# Patient Record
Sex: MALE | Race: BLACK OR AFRICAN AMERICAN | NOT HISPANIC OR LATINO | URBAN - METROPOLITAN AREA
[De-identification: names, ages, dates, MRNs, and addresses within clinical notes are randomized per-mention and may not be internally consistent; named-entity substitution may affect disease eponyms.]

---

## 2017-06-01 ENCOUNTER — OUTPATIENT (OUTPATIENT)
Dept: OUTPATIENT SERVICES | Facility: HOSPITAL | Age: 37
LOS: 1 days | End: 2017-06-01
Payer: MEDICAID

## 2017-06-06 DIAGNOSIS — R69 ILLNESS, UNSPECIFIED: ICD-10-CM

## 2017-08-01 PROCEDURE — G9001: CPT

## 2022-03-12 ENCOUNTER — INPATIENT (INPATIENT)
Facility: HOSPITAL | Age: 42
LOS: 5 days | Discharge: HOME | End: 2022-03-18
Attending: FAMILY MEDICINE | Admitting: FAMILY MEDICINE
Payer: MEDICAID

## 2022-03-12 VITALS
OXYGEN SATURATION: 100 % | SYSTOLIC BLOOD PRESSURE: 142 MMHG | TEMPERATURE: 99 F | RESPIRATION RATE: 40 BRPM | WEIGHT: 175.93 LBS | DIASTOLIC BLOOD PRESSURE: 86 MMHG | HEART RATE: 137 BPM | HEIGHT: 73 IN

## 2022-03-12 DIAGNOSIS — D69.6 THROMBOCYTOPENIA, UNSPECIFIED: ICD-10-CM

## 2022-03-12 DIAGNOSIS — F14.10 COCAINE ABUSE, UNCOMPLICATED: ICD-10-CM

## 2022-03-12 DIAGNOSIS — Z91.013 ALLERGY TO SEAFOOD: ICD-10-CM

## 2022-03-12 DIAGNOSIS — I42.6 ALCOHOLIC CARDIOMYOPATHY: ICD-10-CM

## 2022-03-12 DIAGNOSIS — F10.10 ALCOHOL ABUSE, UNCOMPLICATED: ICD-10-CM

## 2022-03-12 DIAGNOSIS — H10.9 UNSPECIFIED CONJUNCTIVITIS: ICD-10-CM

## 2022-03-12 DIAGNOSIS — L30.9 DERMATITIS, UNSPECIFIED: ICD-10-CM

## 2022-03-12 DIAGNOSIS — I50.23 ACUTE ON CHRONIC SYSTOLIC (CONGESTIVE) HEART FAILURE: ICD-10-CM

## 2022-03-12 DIAGNOSIS — J45.901 UNSPECIFIED ASTHMA WITH (ACUTE) EXACERBATION: ICD-10-CM

## 2022-03-12 DIAGNOSIS — F12.90 CANNABIS USE, UNSPECIFIED, UNCOMPLICATED: ICD-10-CM

## 2022-03-12 DIAGNOSIS — F17.210 NICOTINE DEPENDENCE, CIGARETTES, UNCOMPLICATED: ICD-10-CM

## 2022-03-12 DIAGNOSIS — J18.9 PNEUMONIA, UNSPECIFIED ORGANISM: ICD-10-CM

## 2022-03-12 DIAGNOSIS — J96.01 ACUTE RESPIRATORY FAILURE WITH HYPOXIA: ICD-10-CM

## 2022-03-12 LAB
ALBUMIN SERPL ELPH-MCNC: 3.1 G/DL — LOW (ref 3.3–5)
ALBUMIN SERPL ELPH-MCNC: 3.1 G/DL — LOW (ref 3.3–5)
ALP SERPL-CCNC: 141 U/L — HIGH (ref 40–120)
ALP SERPL-CCNC: 146 U/L — HIGH (ref 40–120)
ALT FLD-CCNC: 74 U/L — SIGNIFICANT CHANGE UP (ref 12–78)
ALT FLD-CCNC: 87 U/L — HIGH (ref 12–78)
ANION GAP SERPL CALC-SCNC: 6 MMOL/L — SIGNIFICANT CHANGE UP (ref 5–17)
ANION GAP SERPL CALC-SCNC: 7 MMOL/L — SIGNIFICANT CHANGE UP (ref 5–17)
AST SERPL-CCNC: 91 U/L — HIGH (ref 15–37)
AST SERPL-CCNC: 94 U/L — HIGH (ref 15–37)
BASE EXCESS BLDA CALC-SCNC: 0.9 MMOL/L — SIGNIFICANT CHANGE UP (ref -2–3)
BASOPHILS # BLD AUTO: 0.07 K/UL — SIGNIFICANT CHANGE UP (ref 0–0.2)
BASOPHILS NFR BLD AUTO: 0.9 % — SIGNIFICANT CHANGE UP (ref 0–2)
BILIRUB DIRECT SERPL-MCNC: 0.2 MG/DL — SIGNIFICANT CHANGE UP (ref 0–0.3)
BILIRUB INDIRECT FLD-MCNC: 0.5 MG/DL — SIGNIFICANT CHANGE UP (ref 0.2–1)
BILIRUB SERPL-MCNC: 0.7 MG/DL — SIGNIFICANT CHANGE UP (ref 0.2–1.2)
BILIRUB SERPL-MCNC: 0.9 MG/DL — SIGNIFICANT CHANGE UP (ref 0.2–1.2)
BLOOD GAS COMMENTS: SIGNIFICANT CHANGE UP
BLOOD GAS COMMENTS: SIGNIFICANT CHANGE UP
BUN SERPL-MCNC: 16 MG/DL — SIGNIFICANT CHANGE UP (ref 7–23)
BUN SERPL-MCNC: 17 MG/DL — SIGNIFICANT CHANGE UP (ref 7–23)
CALCIUM SERPL-MCNC: 7.9 MG/DL — LOW (ref 8.5–10.1)
CALCIUM SERPL-MCNC: 8.3 MG/DL — LOW (ref 8.5–10.1)
CHLORIDE SERPL-SCNC: 103 MMOL/L — SIGNIFICANT CHANGE UP (ref 96–108)
CHLORIDE SERPL-SCNC: 107 MMOL/L — SIGNIFICANT CHANGE UP (ref 96–108)
CO2 BLDA-SCNC: 25 MMOL/L — HIGH (ref 19–24)
CO2 SERPL-SCNC: 25 MMOL/L — SIGNIFICANT CHANGE UP (ref 22–31)
CO2 SERPL-SCNC: 26 MMOL/L — SIGNIFICANT CHANGE UP (ref 22–31)
CREAT SERPL-MCNC: 1.04 MG/DL — SIGNIFICANT CHANGE UP (ref 0.5–1.3)
CREAT SERPL-MCNC: 1.22 MG/DL — SIGNIFICANT CHANGE UP (ref 0.5–1.3)
EGFR: 77 ML/MIN/1.73M2 — SIGNIFICANT CHANGE UP
EGFR: 94 ML/MIN/1.73M2 — SIGNIFICANT CHANGE UP
EOSINOPHIL # BLD AUTO: 0.68 K/UL — HIGH (ref 0–0.5)
EOSINOPHIL NFR BLD AUTO: 8.3 % — HIGH (ref 0–6)
FLUAV AG NPH QL: SIGNIFICANT CHANGE UP
FLUBV AG NPH QL: SIGNIFICANT CHANGE UP
GAS PNL BLDA: SIGNIFICANT CHANGE UP
GAS PNL BLDV: SIGNIFICANT CHANGE UP
GLUCOSE SERPL-MCNC: 103 MG/DL — HIGH (ref 70–99)
GLUCOSE SERPL-MCNC: 190 MG/DL — HIGH (ref 70–99)
HCO3 BLDA-SCNC: 24 MMOL/L — SIGNIFICANT CHANGE UP (ref 21–28)
HCT VFR BLD CALC: 40.2 % — SIGNIFICANT CHANGE UP (ref 39–50)
HGB BLD-MCNC: 13.2 G/DL — SIGNIFICANT CHANGE UP (ref 13–17)
HOROWITZ INDEX BLDA+IHG-RTO: 28 — SIGNIFICANT CHANGE UP
IMM GRANULOCYTES NFR BLD AUTO: 0.2 % — SIGNIFICANT CHANGE UP (ref 0–1.5)
LYMPHOCYTES # BLD AUTO: 1.68 K/UL — SIGNIFICANT CHANGE UP (ref 1–3.3)
LYMPHOCYTES # BLD AUTO: 20.5 % — SIGNIFICANT CHANGE UP (ref 13–44)
MAGNESIUM SERPL-MCNC: 2.1 MG/DL — SIGNIFICANT CHANGE UP (ref 1.6–2.6)
MCHC RBC-ENTMCNC: 32.8 G/DL — SIGNIFICANT CHANGE UP (ref 32–36)
MCHC RBC-ENTMCNC: 32.8 PG — SIGNIFICANT CHANGE UP (ref 27–34)
MCV RBC AUTO: 100 FL — SIGNIFICANT CHANGE UP (ref 80–100)
MONOCYTES # BLD AUTO: 0.98 K/UL — HIGH (ref 0–0.9)
MONOCYTES NFR BLD AUTO: 12 % — SIGNIFICANT CHANGE UP (ref 2–14)
NEUTROPHILS # BLD AUTO: 4.77 K/UL — SIGNIFICANT CHANGE UP (ref 1.8–7.4)
NEUTROPHILS NFR BLD AUTO: 58.1 % — SIGNIFICANT CHANGE UP (ref 43–77)
NRBC # BLD: 0 /100 WBCS — SIGNIFICANT CHANGE UP (ref 0–0)
NT-PROBNP SERPL-SCNC: 3696 PG/ML — HIGH (ref 0–125)
PCO2 BLDA: 33 MMHG — SIGNIFICANT CHANGE UP (ref 32–46)
PH BLD: 7.47 — HIGH (ref 7.35–7.45)
PHOSPHATE SERPL-MCNC: 1.9 MG/DL — LOW (ref 2.5–4.5)
PLATELET # BLD AUTO: 125 K/UL — LOW (ref 150–400)
PO2 BLDA: 114 MMHG — HIGH (ref 83–108)
POTASSIUM SERPL-MCNC: 3.6 MMOL/L — SIGNIFICANT CHANGE UP (ref 3.5–5.3)
POTASSIUM SERPL-MCNC: 4.9 MMOL/L — SIGNIFICANT CHANGE UP (ref 3.5–5.3)
POTASSIUM SERPL-SCNC: 3.6 MMOL/L — SIGNIFICANT CHANGE UP (ref 3.5–5.3)
POTASSIUM SERPL-SCNC: 4.9 MMOL/L — SIGNIFICANT CHANGE UP (ref 3.5–5.3)
PROT SERPL-MCNC: 7.5 GM/DL — SIGNIFICANT CHANGE UP (ref 6–8.3)
PROT SERPL-MCNC: 7.6 GM/DL — SIGNIFICANT CHANGE UP (ref 6–8.3)
RAPID RVP RESULT: SIGNIFICANT CHANGE UP
RBC # BLD: 4.02 M/UL — LOW (ref 4.2–5.8)
RBC # FLD: 13.1 % — SIGNIFICANT CHANGE UP (ref 10.3–14.5)
SAO2 % BLDA: 99.3 % — HIGH (ref 94–98)
SARS-COV-2 RNA SPEC QL NAA+PROBE: SIGNIFICANT CHANGE UP
SARS-COV-2 RNA SPEC QL NAA+PROBE: SIGNIFICANT CHANGE UP
SODIUM SERPL-SCNC: 136 MMOL/L — SIGNIFICANT CHANGE UP (ref 135–145)
SODIUM SERPL-SCNC: 138 MMOL/L — SIGNIFICANT CHANGE UP (ref 135–145)
TROPONIN I, HIGH SENSITIVITY RESULT: 58.4 NG/L — SIGNIFICANT CHANGE UP
WBC # BLD: 8.2 K/UL — SIGNIFICANT CHANGE UP (ref 3.8–10.5)
WBC # FLD AUTO: 8.2 K/UL — SIGNIFICANT CHANGE UP (ref 3.8–10.5)

## 2022-03-12 PROCEDURE — 99285 EMERGENCY DEPT VISIT HI MDM: CPT

## 2022-03-12 PROCEDURE — 99222 1ST HOSP IP/OBS MODERATE 55: CPT

## 2022-03-12 PROCEDURE — 93010 ELECTROCARDIOGRAM REPORT: CPT

## 2022-03-12 PROCEDURE — 93970 EXTREMITY STUDY: CPT | Mod: 26

## 2022-03-12 PROCEDURE — 71045 X-RAY EXAM CHEST 1 VIEW: CPT | Mod: 26

## 2022-03-12 RX ORDER — INFLUENZA VIRUS VACCINE 15; 15; 15; 15 UG/.5ML; UG/.5ML; UG/.5ML; UG/.5ML
0.5 SUSPENSION INTRAMUSCULAR ONCE
Refills: 0 | Status: DISCONTINUED | OUTPATIENT
Start: 2022-03-12 | End: 2022-03-18

## 2022-03-12 RX ORDER — POTASSIUM CHLORIDE 20 MEQ
40 PACKET (EA) ORAL ONCE
Refills: 0 | Status: COMPLETED | OUTPATIENT
Start: 2022-03-12 | End: 2022-03-12

## 2022-03-12 RX ORDER — BUDESONIDE AND FORMOTEROL FUMARATE DIHYDRATE 160; 4.5 UG/1; UG/1
2 AEROSOL RESPIRATORY (INHALATION)
Refills: 0 | Status: DISCONTINUED | OUTPATIENT
Start: 2022-03-12 | End: 2022-03-13

## 2022-03-12 RX ORDER — HEPARIN SODIUM 5000 [USP'U]/ML
5000 INJECTION INTRAVENOUS; SUBCUTANEOUS EVERY 12 HOURS
Refills: 0 | Status: DISCONTINUED | OUTPATIENT
Start: 2022-03-12 | End: 2022-03-18

## 2022-03-12 RX ORDER — MAGNESIUM SULFATE 500 MG/ML
2 VIAL (ML) INJECTION ONCE
Refills: 0 | Status: COMPLETED | OUTPATIENT
Start: 2022-03-12 | End: 2022-03-12

## 2022-03-12 RX ORDER — TIOTROPIUM BROMIDE 18 UG/1
1 CAPSULE ORAL; RESPIRATORY (INHALATION) DAILY
Refills: 0 | Status: DISCONTINUED | OUTPATIENT
Start: 2022-03-12 | End: 2022-03-18

## 2022-03-12 RX ORDER — AZITHROMYCIN 500 MG/1
500 TABLET, FILM COATED ORAL ONCE
Refills: 0 | Status: COMPLETED | OUTPATIENT
Start: 2022-03-12 | End: 2022-03-12

## 2022-03-12 RX ORDER — IPRATROPIUM/ALBUTEROL SULFATE 18-103MCG
3 AEROSOL WITH ADAPTER (GRAM) INHALATION EVERY 6 HOURS
Refills: 0 | Status: DISCONTINUED | OUTPATIENT
Start: 2022-03-12 | End: 2022-03-18

## 2022-03-12 RX ORDER — CEFTRIAXONE 500 MG/1
1000 INJECTION, POWDER, FOR SOLUTION INTRAMUSCULAR; INTRAVENOUS EVERY 24 HOURS
Refills: 0 | Status: COMPLETED | OUTPATIENT
Start: 2022-03-12 | End: 2022-03-16

## 2022-03-12 RX ORDER — BUDESONIDE AND FORMOTEROL FUMARATE DIHYDRATE 160; 4.5 UG/1; UG/1
2 AEROSOL RESPIRATORY (INHALATION)
Refills: 0 | Status: DISCONTINUED | OUTPATIENT
Start: 2022-03-12 | End: 2022-03-12

## 2022-03-12 RX ORDER — CEFTRIAXONE 500 MG/1
1000 INJECTION, POWDER, FOR SOLUTION INTRAMUSCULAR; INTRAVENOUS ONCE
Refills: 0 | Status: COMPLETED | OUTPATIENT
Start: 2022-03-12 | End: 2022-03-12

## 2022-03-12 RX ORDER — IPRATROPIUM/ALBUTEROL SULFATE 18-103MCG
3 AEROSOL WITH ADAPTER (GRAM) INHALATION
Refills: 0 | Status: COMPLETED | OUTPATIENT
Start: 2022-03-12 | End: 2022-03-12

## 2022-03-12 RX ORDER — HYDROXYZINE HCL 10 MG
25 TABLET ORAL DAILY
Refills: 0 | Status: COMPLETED | OUTPATIENT
Start: 2022-03-12 | End: 2022-03-17

## 2022-03-12 RX ADMIN — Medication 1 APPLICATION(S): at 17:04

## 2022-03-12 RX ADMIN — CEFTRIAXONE 100 MILLIGRAM(S): 500 INJECTION, POWDER, FOR SOLUTION INTRAMUSCULAR; INTRAVENOUS at 14:18

## 2022-03-12 RX ADMIN — Medication 3 MILLILITER(S): at 06:04

## 2022-03-12 RX ADMIN — Medication 2 GRAM(S): at 06:05

## 2022-03-12 RX ADMIN — Medication 3 MILLILITER(S): at 17:57

## 2022-03-12 RX ADMIN — Medication 25 MILLIGRAM(S): at 15:41

## 2022-03-12 RX ADMIN — Medication 40 MILLIGRAM(S): at 14:18

## 2022-03-12 RX ADMIN — Medication 3 MILLILITER(S): at 05:44

## 2022-03-12 RX ADMIN — BUDESONIDE AND FORMOTEROL FUMARATE DIHYDRATE 2 PUFF(S): 160; 4.5 AEROSOL RESPIRATORY (INHALATION) at 23:50

## 2022-03-12 RX ADMIN — Medication 20 MILLIGRAM(S): at 21:36

## 2022-03-12 RX ADMIN — CEFTRIAXONE 100 MILLIGRAM(S): 500 INJECTION, POWDER, FOR SOLUTION INTRAMUSCULAR; INTRAVENOUS at 07:03

## 2022-03-12 RX ADMIN — Medication 20 MILLIGRAM(S): at 15:43

## 2022-03-12 RX ADMIN — CEFTRIAXONE 1000 MILLIGRAM(S): 500 INJECTION, POWDER, FOR SOLUTION INTRAMUSCULAR; INTRAVENOUS at 07:31

## 2022-03-12 RX ADMIN — Medication 3 MILLILITER(S): at 06:24

## 2022-03-12 RX ADMIN — AZITHROMYCIN 255 MILLIGRAM(S): 500 TABLET, FILM COATED ORAL at 07:36

## 2022-03-12 RX ADMIN — Medication 40 MILLIEQUIVALENT(S): at 07:03

## 2022-03-12 RX ADMIN — Medication 125 MILLIGRAM(S): at 05:44

## 2022-03-12 RX ADMIN — Medication 150 GRAM(S): at 05:45

## 2022-03-12 RX ADMIN — HEPARIN SODIUM 5000 UNIT(S): 5000 INJECTION INTRAVENOUS; SUBCUTANEOUS at 17:05

## 2022-03-12 NOTE — ED ADULT NURSE REASSESSMENT NOTE - NS ED NURSE REASSESS COMMENT FT1
pt is A&Ox3, moveed to 32D at this time, updated on plan of care at this time, Doctor morgan aware of the vitals at this time, pt is currently on BIPAP
Patient alert and oriented X 4, assumed care of patient at this time. Patient on bipap and expressed improvement in condition. no wheezing noted at this time. Denies complaints, will continue to monitor.

## 2022-03-12 NOTE — ED PROVIDER NOTE - PHYSICAL EXAMINATION
VITAL SIGNS: I have reviewed nursing notes and confirm.  CONSTITUTIONAL: in acute respiratory distress   SKIN: Warm dry, normal skin turgor, diffuse eczema   HEAD: NCAT  EYES:, no scleral icterus  ENT: Moist mucous membranes, normal pharynx   NECK: Supple; non tender. Full ROM.   CARD: tachycardia, no murmurs, rubs or gallops  RESP: increased WOB, coarse b/l wheezes diffusely   ABD: soft, + BS, non-tender, non-distended, no rebound or guarding. No CVA tenderness  EXT: Full ROM, no bony tenderness, no pedal edema, no calf tenderness  NEURO: normal motor. normal sensory. non-focal   PSYCH: Cooperative, appropriate.

## 2022-03-12 NOTE — H&P ADULT - HISTORY OF PRESENT ILLNESS
41M     with hx of  childhood  br  asthma w/ no previous intubations in the past for asthma, hospitalized in the past for asthma exacerbation/  h/o child horowitz  eczema  smokes  2  cigs/ day and also indulges  in etoh/  every  5  to 7  days pe r  pt, if  he  does  not, then per  pt , he gets seizures    pt is unemployed  and lives  with hie mother     p/w via EMS for asthma exacerbation today   per  e r note, pt - was  given 12 mg Decadron, duonebs x 2, epipen - tachypneic w/ bilateral diffuse coarse wheezing bilaterally. started on bipap in the ED,    denies sick contacts. admits to non-productive cough. no fevers/chills, no other complaints. also admit to chest tightness that he feels w/ previous asthma exacerbations.  covid negative  41M     with hx of  childhood  br  asthma w/ no previous intubations in the past for asthma, hospitalized in the past for asthma exacerbation/  h/o child ohrowitz  eczema  smokes  2  cigs/ day and also indulges  in etoh/  every  5  to 7  days pe r  pt, if  he  does  not, then per  pt , he gets seizures    pt is unemployed  and lives  with hie mother  smokes  2  cigarettes/ day/ rare marihuana  use/ denies  vaping     p/w via EMS for asthma exacerbation today   per  e r note, pt - was  given 12 mg Decadron, duonebs x 2, epipen - tachypneic w/ bilateral diffuse coarse wheezing bilaterally. started on bipap in the ED,    denies sick contacts. admits to non-productive cough. no fevers/chills, no other complaints. also admit to chest tightness that he feels w/ previous asthma exacerbations.  covid negative

## 2022-03-12 NOTE — ED ADULT NURSE NOTE - OBJECTIVE STATEMENT
Patient aox3. patient with hx of HTN and asthma. Patient c/o sob, cp, palpitations, nausea and vomiting x 2 hours since 3am. Patient reports sudden onset of symptoms after laying down to sleep. Patient reports he ate tuna salad before laying down. Patient (+) smoker cigarettes. Patient with allergy to shellfish. Patient aox3. patient with hx of eczema HTN and asthma. Patient c/o sob, cp, palpitations, nausea and vomiting x 2 hours since 3am. Patient reports sudden onset of symptoms after laying down to sleep. Patient reports he ate tuna salad before laying down. Patient (+) smoker cigarettes. Patient with allergy to shellfish.

## 2022-03-12 NOTE — ED ADULT TRIAGE NOTE - CHIEF COMPLAINT QUOTE
hx of asthma PW exacerbation and midsternal chest pain. pt gave self 5 albuterol treatment w/o effect given 2 Combivent tx, epi 0.3 ml  and 12mg dexamethasone by EMS IM . In triage pt utilizing accessory muscles, speaking in short sentences.

## 2022-03-12 NOTE — ED PROVIDER NOTE - OBJECTIVE STATEMENT
41M with hx of asthma w/ no previous intubations in the past for asthma, hospitalized in the past for asthma exacerbation p/w via EMS for asthma exacerbation today - given 12 mg Decadron, duonebs x 2, epipen - tachypneic w/ bilateral diffuse coarse wheezing bilaterally. started on bipap in the ED, denies sick contacts. admits to non-productive cough. no fevers/chills, no other complaints. also admit to chest tightness that he feels w/ previous asthma exacerbations.

## 2022-03-12 NOTE — PATIENT PROFILE ADULT - FALL HARM RISK - RISK INTERVENTIONS

## 2022-03-12 NOTE — ED PROVIDER NOTE - NS ED ROS FT
Constitutional: See HPI.  Eyes: No visual changes, eye pain or discharge. No Photophobia  ENMT: No hearing changes, pain, discharge or infections. No neck pain or stiffness. No limited ROM  Cardiac: see hpi   Respiratory: see hpi   GI: No nausea, vomiting, diarrhea or abdominal pain.  : No dysuria, frequency or burning. No Discharge  MS: No myalgia, muscle weakness, joint pain or back pain.  Neuro: No headache or weakness. No LOC.  Skin: No skin rash.  Except as documented in the HPI, all other systems are negative.

## 2022-03-12 NOTE — ED PROVIDER NOTE - CLINICAL SUMMARY MEDICAL DECISION MAKING FREE TEXT BOX
Acute asthma exacerbation w/ increased WOB  Bipap - duonebs inline  solumedrol, magnesium   reassess and disposition accordingly  will s/o to oncoming attending for f/u

## 2022-03-12 NOTE — H&P ADULT - NSHPPHYSICALEXAM_GEN_ALL_CORE
PHYSICAL EXAMINATION:  Vital Signs Last 24 Hrs  T(C): 36.7 (12 Mar 2022 09:37), Max: 37.3 (12 Mar 2022 05:26)  T(F): 98 (12 Mar 2022 09:37), Max: 99.1 (12 Mar 2022 05:26)  HR: 94 (12 Mar 2022 13:25) (87 - 137)  BP: 114/68 (12 Mar 2022 09:37) (105/68 - 142/86)  BP(mean): --  RR: 20 (12 Mar 2022 09:37) (13 - 40)  SpO2: 98% (12 Mar 2022 13:25) (98% - 100%)  CAPILLARY BLOOD GLUCOSE            GENERAL: NAD, well-groomed,  HEAD:  atraumatic, normocephalic  EYES: sclera anicteric  ENMT: mucous membranes moist  NECK: supple, No JVD  CHEST/LUNG: clear to auscultation bilaterally;    no      rales   ,less  rhonchi,   HEART: normal S1, S2  ABDOMEN: BS+, soft, ND, NT   EXTREMITIES:    no    edema    b/l LEs  NEURO: awake, ,     moves all extremities  SKIN: no     rash

## 2022-03-12 NOTE — H&P ADULT - ASSESSMENT
41M   hx of  childhood  br  asthma w/ no previous intubations in the past for asthma, hospitalized in the past for asthma exacerbation/  h/o child horowitz  eczema  smokes  2  cigs/ day and also indulges  in etoh/  every  5  to 7  days pe r  pt, if  he  does  not, then per  pt , he gets seizures  pt is unemployed  and lives  with hie mother     p/w via EMS for asthma exacerbation today   per  ED  note, pt - was  given 12 mg Decadron, duonebs x 2, epipen - / s/p tachypnea  w/ bilateral   wheezing,  was  on bipap in the ED,    denies sick contacts. admits to non-productive cough. no fevers/chills, no other complaints. also admit to chest tightness that he feels w/ previous asthma exacerbations.  covid negative       * sob, from  acute bronchial asthma  exacerbation   on iv  solumedrol.  O2  nasal canula,  proventil nebs/ symbicort  pulm dr ernst  advised to  quit his  cigarette smoking/  not ready yet  cxr  with ?  pna/ RLL  opacity,    on iv Rocephin,  Ct chest pending    *  h/o c/c  eczema    on steroid   cream/ atarax  *  h/o etoh  indulgence/  last drink   was   about    3 to  5 days  ago, pe r pt   on ciwa  symptom  triggered ,  as  pt  . sometimes he gets  seizures  on dvt ppx/  s/q  heparin      41M   hx of  childhood  br  asthma w/ no previous intubations in the past for asthma, hospitalized in the past for asthma exacerbation/  h/o child horowitz  eczema  smokes  2  cigs/ day and also indulges  in etoh/  every  5  to 7  days pe r  pt, if  he  does  not, then per  pt , he gets seizures  pt is unemployed  and lives  with hie mother     p/w via EMS for asthma exacerbation today   per  ED  note, pt - was  given 12 mg Decadron, duonebs x 2, epipen - / s/p tachypnea  w/ bilateral   wheezing,  was  on bipap in the ED,   denies sick contacts./ no fevers/chills/ cp/ abd pain  covid negative       * sob, from  acute bronchial asthma  exacerbation   on iv  solumedrol.  O2  nasal canula,  proventil nebs/ symbicort  pulm dr ernst  advised to  quit his  cigarette smoking/  not ready yet  cxr  with ?  pna/ RLL  opacity,    on iv Rocephin,  Ct chest pending    *  h/o c/c  eczema    on steroid   cream/ atarax  *  h/o etoh  indulgence/  last drink   was   about    3 to  5 days  ago, pe r pt   on ciwa  protocol  / symptom  triggered ,  as  pt  . sometimes he gets  seizures  on dvt ppx/  s/q  heparin

## 2022-03-12 NOTE — H&P ADULT - NSHPLABSRESULTS_GEN_ALL_CORE
LABS:                        13.2   8.20  )-----------( 125      ( 12 Mar 2022 06:08 )             40.2     03-12    136  |  103  |  16  ----------------------------<  103<H>  3.6   |  26  |  1.04    Ca    7.9<L>      12 Mar 2022 06:08    TPro  7.5  /  Alb  3.1<L>  /  TBili  0.9  /  DBili  x   /  AST  91<H>  /  ALT  74  /  AlkPhos  146<H>  03-12              ABG - ( 12 Mar 2022 06:03 )  pH, Arterial: 7.47  pH, Blood: x     /  pCO2: 32    /  pO2: 197   / HCO3: 23    / Base Excess: 0.2   /  SaO2: 100.0             03-12 @ 05:54  Performed In Lab  --

## 2022-03-13 LAB
ANION GAP SERPL CALC-SCNC: 6 MMOL/L — SIGNIFICANT CHANGE UP (ref 5–17)
BUN SERPL-MCNC: 22 MG/DL — SIGNIFICANT CHANGE UP (ref 7–23)
CALCIUM SERPL-MCNC: 8.5 MG/DL — SIGNIFICANT CHANGE UP (ref 8.5–10.1)
CHLORIDE SERPL-SCNC: 109 MMOL/L — HIGH (ref 96–108)
CO2 SERPL-SCNC: 22 MMOL/L — SIGNIFICANT CHANGE UP (ref 22–31)
CREAT SERPL-MCNC: 1.05 MG/DL — SIGNIFICANT CHANGE UP (ref 0.5–1.3)
EGFR: 93 ML/MIN/1.73M2 — SIGNIFICANT CHANGE UP
GLUCOSE SERPL-MCNC: 268 MG/DL — HIGH (ref 70–99)
HCT VFR BLD CALC: 34.3 % — LOW (ref 39–50)
HEPARIN-PF4 AB RESULT: <0.6 U/ML — SIGNIFICANT CHANGE UP (ref 0–0.9)
HGB BLD-MCNC: 11.3 G/DL — LOW (ref 13–17)
MAGNESIUM SERPL-MCNC: 2.2 MG/DL — SIGNIFICANT CHANGE UP (ref 1.6–2.6)
MCHC RBC-ENTMCNC: 32.9 G/DL — SIGNIFICANT CHANGE UP (ref 32–36)
MCHC RBC-ENTMCNC: 32.9 PG — SIGNIFICANT CHANGE UP (ref 27–34)
MCV RBC AUTO: 100 FL — SIGNIFICANT CHANGE UP (ref 80–100)
NRBC # BLD: 0 /100 WBCS — SIGNIFICANT CHANGE UP (ref 0–0)
PF4 HEPARIN CMPLX AB SER-ACNC: NEGATIVE — SIGNIFICANT CHANGE UP
PHOSPHATE SERPL-MCNC: 2.5 MG/DL — SIGNIFICANT CHANGE UP (ref 2.5–4.5)
PLATELET # BLD AUTO: 123 K/UL — LOW (ref 150–400)
POTASSIUM SERPL-MCNC: 4.8 MMOL/L — SIGNIFICANT CHANGE UP (ref 3.5–5.3)
POTASSIUM SERPL-SCNC: 4.8 MMOL/L — SIGNIFICANT CHANGE UP (ref 3.5–5.3)
PROCALCITONIN SERPL-MCNC: 0.3 NG/ML — HIGH (ref 0.02–0.1)
RBC # BLD: 3.43 M/UL — LOW (ref 4.2–5.8)
RBC # FLD: 13 % — SIGNIFICANT CHANGE UP (ref 10.3–14.5)
SODIUM SERPL-SCNC: 137 MMOL/L — SIGNIFICANT CHANGE UP (ref 135–145)
WBC # BLD: 7.6 K/UL — SIGNIFICANT CHANGE UP (ref 3.8–10.5)
WBC # FLD AUTO: 7.6 K/UL — SIGNIFICANT CHANGE UP (ref 3.8–10.5)

## 2022-03-13 PROCEDURE — 71250 CT THORAX DX C-: CPT | Mod: 26

## 2022-03-13 PROCEDURE — 99233 SBSQ HOSP IP/OBS HIGH 50: CPT

## 2022-03-13 PROCEDURE — 93306 TTE W/DOPPLER COMPLETE: CPT | Mod: 26

## 2022-03-13 PROCEDURE — 99232 SBSQ HOSP IP/OBS MODERATE 35: CPT

## 2022-03-13 RX ORDER — FUROSEMIDE 40 MG
40 TABLET ORAL
Refills: 0 | Status: DISCONTINUED | OUTPATIENT
Start: 2022-03-13 | End: 2022-03-14

## 2022-03-13 RX ORDER — AZITHROMYCIN 500 MG/1
500 TABLET, FILM COATED ORAL DAILY
Refills: 0 | Status: DISCONTINUED | OUTPATIENT
Start: 2022-03-13 | End: 2022-03-15

## 2022-03-13 RX ADMIN — Medication 3 MILLILITER(S): at 05:47

## 2022-03-13 RX ADMIN — Medication 40 MILLIGRAM(S): at 17:41

## 2022-03-13 RX ADMIN — Medication 1 APPLICATION(S): at 05:30

## 2022-03-13 RX ADMIN — CEFTRIAXONE 100 MILLIGRAM(S): 500 INJECTION, POWDER, FOR SOLUTION INTRAMUSCULAR; INTRAVENOUS at 14:15

## 2022-03-13 RX ADMIN — HEPARIN SODIUM 5000 UNIT(S): 5000 INJECTION INTRAVENOUS; SUBCUTANEOUS at 05:28

## 2022-03-13 RX ADMIN — BUDESONIDE AND FORMOTEROL FUMARATE DIHYDRATE 2 PUFF(S): 160; 4.5 AEROSOL RESPIRATORY (INHALATION) at 05:28

## 2022-03-13 RX ADMIN — AZITHROMYCIN 500 MILLIGRAM(S): 500 TABLET, FILM COATED ORAL at 12:46

## 2022-03-13 RX ADMIN — Medication 3 MILLILITER(S): at 11:52

## 2022-03-13 RX ADMIN — Medication 1 APPLICATION(S): at 17:44

## 2022-03-13 RX ADMIN — Medication 3 MILLILITER(S): at 17:56

## 2022-03-13 RX ADMIN — Medication 20 MILLIGRAM(S): at 05:28

## 2022-03-13 RX ADMIN — Medication 25 MILLIGRAM(S): at 11:37

## 2022-03-13 RX ADMIN — Medication 3 MILLILITER(S): at 00:03

## 2022-03-13 RX ADMIN — HEPARIN SODIUM 5000 UNIT(S): 5000 INJECTION INTRAVENOUS; SUBCUTANEOUS at 17:45

## 2022-03-13 RX ADMIN — TIOTROPIUM BROMIDE 1 CAPSULE(S): 18 CAPSULE ORAL; RESPIRATORY (INHALATION) at 12:46

## 2022-03-13 NOTE — PROGRESS NOTE ADULT - ASSESSMENT
ROBBEI REYNOSO 41 F 3/12/2022 1980 DR DARRON NELSON    REVIEW OF SYMPTOMS      Able to give ROS  Yes     RELIABLE +/-   CONSTITUTIONAL Weakness Yes  Chills No   ENDOCRINE  No heat or cold intolerance    ALLERGY No hives  Sore throat No stridor  RESP Coughing blood no  Shortness of breath YES   NEURO No Headache  Confusion Pain neck No   CARDIAC No Chest pain No Palpitations   GI  Pain abdomen NO   Vomiting NO     PHYSICAL EXAM    HEENT Unremarkable  atraumatic   RESP Fair air entry EXP prolonged    Harsh breath sound Resp distres mild   CARDIAC S1 S2 No S3     NO JVD    ABDOMEN SOFT BS PRESENT NOT DISTENDED No hepatosplenomegaly PEDAL EDEMA present No calf tenderness  NO rash     DOA/CC/PROBLEMS poa .  3/12/2022   41 m  asthma ex    PMH-PSH .  pmh asthma  pmh     COVID/ICU/CODE STATUS.                       COVID  STATUS.   3/12/2022 scv2 (-)         ICU STAY. none  GOC.               3/12/2022 full code    BEST PRACTICE ISSUES.                                                  HEAD OF BED ELEVATION. Yes  DVT PROPHYLAXIS.    3/12/2022 hpsc   BRAVO PROPHYLAXIS.                                                                                        DIET.   3/12/2022 regl    VITALS/PO/IO/VENT/DRIPS.     3/13/2022 afeb 99 120/80      PROBLEM DATA/PLAN.    HEMODYNAMICS.   Monitor bp Target MAP 65 (+)    RESP.   Monitor po Target po 90-95%      PMH/PSH PROBLEMS.  Management continued/modified as indicated    OXYGEN REQUIREMENTS.  3/13/2022 2l 97%     INFECTION.  W 3/12-3/13/2022 w 8.2 - 7.6   cxr 3/12/2022 hazy airspcae dis rll  FLU ab 3/12/2022 (-)   ct ch 3/13/2022 pneumonia chf  needs followup ct ch  abio 3/12/2022 rocephin   3/13/2022 azithro   AR  3/12/2022 check procalkc  3/12/2022 check ct ch    ASTHMA ex.  3/12/2022 duoneb.4   3/12/2022 symbicort 80   3/12/2022 solumed 20.3   AR  3/12/2022 added spiriva  3/12/2022 Checnged to symbicort 160    RESP FAILURE  3/12/2022 6a abg 40% 747/32/197   ar  hypoxemic resp failure  o2 suppl    RO VTE  3/13/2022 v duplx (-)    CHF  ct ch 3/13/2022 pneumonia chf  needs followup ct ch  bnp 3/12/2022 bnp 3696   3/13/2022 kasix 40.2   3/12/2022 Check echo    THROMBOCYTOPENIA   plt 3/12/2022 plt 125   hit 3/13/2022 (-)   monitor      TIME SPENT   Over 25 minutes aggregate care time spent on encounter; activities included   direct patient care, counseling and/or coordinating care reviewing notes, lab data/ imaging , discussion with multidisciplinary team/ patient  /family and explaining in detail risks, benefits, alternatives  of the recommendations     ROBBIE REYNOSO 41 F 3/12/2022 1980 DR DARRON NELSON

## 2022-03-13 NOTE — PROGRESS NOTE ADULT - SUBJECTIVE AND OBJECTIVE BOX
ANGELES AVALOS    LVS 2C 243 D    Allergies    No Known Drug Allergies  shellfish (Angioedema)    Intolerances        PAST MEDICAL & SURGICAL HISTORY:  HTN (hypertension)    Asthma        FAMILY HISTORY:      Home Medications:      MEDICATIONS  (STANDING):  albuterol/ipratropium for Nebulization 3 milliLiter(s) Nebulizer every 6 hours  azithromycin   Tablet 500 milliGRAM(s) Oral daily  budesonide 160 MICROgram(s)/formoterol 4.5 MICROgram(s) Inhaler 2 Puff(s) Inhalation two times a day  cefTRIAXone   IVPB 1000 milliGRAM(s) IV Intermittent every 24 hours  furosemide   Injectable 40 milliGRAM(s) IV Push two times a day  heparin   Injectable 5000 Unit(s) SubCutaneous every 12 hours  hydrOXYzine hydrochloride 25 milliGRAM(s) Oral daily  influenza   Vaccine 0.5 milliLiter(s) IntraMuscular once  methylPREDNISolone sodium succinate Injectable 20 milliGRAM(s) IV Push three times a day  tiotropium 18 MICROgram(s) Capsule 1 Capsule(s) Inhalation daily  triamcinolone 0.1% Ointment 1 Application(s) Topical every 12 hours    MEDICATIONS  (PRN):  LORazepam     Tablet 2 milliGRAM(s) Oral every 2 hours PRN Symptom-triggered 2 point increase in CIWA-Ar      Diet, Regular (03-12-22 @ 08:33) [Active]          Vital Signs Last 24 Hrs  T(C): 36.4 (13 Mar 2022 10:22), Max: 36.8 (13 Mar 2022 05:39)  T(F): 97.5 (13 Mar 2022 10:22), Max: 98.2 (13 Mar 2022 05:39)  HR: 91 (13 Mar 2022 10:22) (87 - 101)  BP: 147/98 (13 Mar 2022 10:22) (124/80 - 154/103)  BP(mean): --  RR: 18 (13 Mar 2022 10:22) (18 - 19)  SpO2: 98% (13 Mar 2022 10:22) (96% - 99%)      03-12-22 @ 06:01  -  03-13-22 @ 07:00  --------------------------------------------------------  IN: 950 mL / OUT: 1850 mL / NET: -900 mL              LABS:                        11.3   7.60  )-----------( 123      ( 13 Mar 2022 07:40 )             34.3     03-13    137  |  109<H>  |  22  ----------------------------<  268<H>  4.8   |  22  |  1.05    Ca    8.5      13 Mar 2022 07:40  Phos  2.5     03-13  Mg     2.2     03-13    TPro  7.6  /  Alb  3.1<L>  /  TBili  0.7  /  DBili  0.2  /  AST  94<H>  /  ALT  87<H>  /  AlkPhos  141<H>  03-12          ABG - ( 12 Mar 2022 18:53 )  pH, Arterial: x     pH, Blood: 7.47  /  pCO2: 33    /  pO2: 114   / HCO3: 24    / Base Excess: 0.9   /  SaO2: 99.3                WBC:  WBC Count: 7.60 K/uL (03-13 @ 07:40)  WBC Count: 8.20 K/uL (03-12 @ 06:08)      MICROBIOLOGY:  RECENT CULTURES:                  Sodium:  Sodium, Serum: 137 mmol/L (03-13 @ 07:40)  Sodium, Serum: 138 mmol/L (03-12 @ 18:26)  Sodium, Serum: 136 mmol/L (03-12 @ 06:08)      1.05 mg/dL 03-13 @ 07:40  1.22 mg/dL 03-12 @ 18:26  1.04 mg/dL 03-12 @ 06:08      Hemoglobin:  Hemoglobin: 11.3 g/dL (03-13 @ 07:40)  Hemoglobin: 13.2 g/dL (03-12 @ 06:08)      Platelets: Platelet Count - Automated: 123 K/uL (03-13 @ 07:40)  Platelet Count - Automated: 125 K/uL (03-12 @ 06:08)      LIVER FUNCTIONS - ( 12 Mar 2022 18:26 )  Alb: 3.1 g/dL / Pro: 7.6 gm/dL / ALK PHOS: 141 U/L / ALT: 87 U/L / AST: 94 U/L / GGT: x                 RADIOLOGY & ADDITIONAL STUDIES:      MICROBIOLOGY:  RECENT CULTURES:

## 2022-03-13 NOTE — PROGRESS NOTE ADULT - ASSESSMENT
41M   hx of  childhood  br  asthma w/ no previous intubations in the past for asthma, hospitalized in the past for asthma exacerbation/  h/o child horowitz  eczema  smokes  2  cigs/ day and also indulges  in etoh/  every  5  to 7  days pe r  pt, if  he  does  not, then per  pt , he gets seizures  pt is unemployed  and lives  with hie mother     p/w via EMS for asthma exacerbation today   per  ED  note, pt - was  given 12 mg Decadron, duonebs x 2, epipen - / s/p tachypnea  w/ bilateral   wheezing,  was  on bipap in the ED,   denies sick contacts./ no fevers/chills/ cp/ abd pain  covid negative       * SOB looks more like pulm yobani Vs PNA, less likely Asthma  Lasix 40 IV BID  Echo pending   Stop  solumedrol.  O2  nasal canula,  proventil nebs/ symbicort  pulm dr ernst   C/w iv Rocephin, add zithromax for now. Would deescalate bases on his response to lasix  Ct chest w/ pulm edema and possible underlying infection    *  h/o c/c  eczema    on steroid   cream/ atarax  *  h/o etoh  indulgence/  last drink   was   about    a week  ago, per pt   on ciwa  protocol  / symptom  triggered ,  as  pt  . sometimes he gets  seizures  on dvt ppx/  s/q  heparin    Patient reports having a coronary angiogram done at [facility] [place] in [place] recently, Will need to obtain records tomorrow.

## 2022-03-13 NOTE — PROGRESS NOTE ADULT - SUBJECTIVE AND OBJECTIVE BOX
Patient is a 41y old  Male who presents with a chief complaint of sob (13 Mar 2022 11:50)       INTERVAL HPI/OVERNIGHT EVENTS: still c/o SOB. Vitals stable.       REVIEW OF SYSTEMS:   Remaining ROS negative    Home Medications:        MEDICATIONS  (STANDING):  albuterol/ipratropium for Nebulization 3 milliLiter(s) Nebulizer every 6 hours  azithromycin   Tablet 500 milliGRAM(s) Oral daily  budesonide 160 MICROgram(s)/formoterol 4.5 MICROgram(s) Inhaler 2 Puff(s) Inhalation two times a day  cefTRIAXone   IVPB 1000 milliGRAM(s) IV Intermittent every 24 hours  furosemide   Injectable 40 milliGRAM(s) IV Push two times a day  heparin   Injectable 5000 Unit(s) SubCutaneous every 12 hours  hydrOXYzine hydrochloride 25 milliGRAM(s) Oral daily  influenza   Vaccine 0.5 milliLiter(s) IntraMuscular once  methylPREDNISolone sodium succinate Injectable 20 milliGRAM(s) IV Push three times a day  tiotropium 18 MICROgram(s) Capsule 1 Capsule(s) Inhalation daily  triamcinolone 0.1% Ointment 1 Application(s) Topical every 12 hours    MEDICATIONS  (PRN):  LORazepam     Tablet 2 milliGRAM(s) Oral every 2 hours PRN Symptom-triggered 2 point increase in CIWA-Ar      Allergies    No Known Drug Allergies  shellfish (Angioedema)    Intolerances        Vital Signs Last 24 Hrs  T(C): 36.4 (13 Mar 2022 10:22), Max: 36.8 (13 Mar 2022 05:39)  T(F): 97.5 (13 Mar 2022 10:22), Max: 98.2 (13 Mar 2022 05:39)  HR: 95 (13 Mar 2022 12:01) (87 - 101)  BP: 147/98 (13 Mar 2022 10:22) (124/80 - 154/103)  BP(mean): --  RR: 18 (13 Mar 2022 10:22) (18 - 19)  SpO2: 98% (13 Mar 2022 12:01) (96% - 99%)    PHYSICAL EXAM:  GENERAL: NAD  HEAD:  Atraumatic, Normocephalic  EYES: EOMI, PERRLA, conjunctiva and sclera clear  ENT: O/P Clear  NECK: Supple, No JVD  NERVOUS SYSTEM:  No focal deficits  CHEST/LUNG: Clear to percussion bilaterally; No rales, rhonchi, wheezing  HEART: Regular rate and rhythm; No murmurs, rubs, or gallops  ABDOMEN: Soft, Nontender, Nondistended; Bowel sounds present  EXTREMITIES:  2+ Peripheral Pulses, No clubbing, cyanosis, trace B/L LE edema  SKIN: No rashes or lesions    LABS:                        11.3   7.60  )-----------( 123      ( 13 Mar 2022 07:40 )             34.3     03-13    137  |  109<H>  |  22  ----------------------------<  268<H>  4.8   |  22  |  1.05    Ca    8.5      13 Mar 2022 07:40  Phos  2.5     03-13  Mg     2.2     03-13    TPro  7.6  /  Alb  3.1<L>  /  TBili  0.7  /  DBili  0.2  /  AST  94<H>  /  ALT  87<H>  /  AlkPhos  141<H>  03-12        CAPILLARY BLOOD GLUCOSE          RADIOLOGY & ADDITIONAL TESTS:    Imaging Personally Reviewed:  [ ] YES  [ ] NO    Consultant(s) Notes Reviewed:  [ ] YES  [ ] NO    Care Discussed with Consultants/Other Providers [ ] YES  [ ] NO

## 2022-03-14 LAB
ANION GAP SERPL CALC-SCNC: 7 MMOL/L — SIGNIFICANT CHANGE UP (ref 5–17)
BUN SERPL-MCNC: 22 MG/DL — SIGNIFICANT CHANGE UP (ref 7–23)
CALCIUM SERPL-MCNC: 9.5 MG/DL — SIGNIFICANT CHANGE UP (ref 8.5–10.1)
CHLORIDE SERPL-SCNC: 105 MMOL/L — SIGNIFICANT CHANGE UP (ref 96–108)
CK SERPL-CCNC: 51 U/L — SIGNIFICANT CHANGE UP (ref 26–308)
CO2 SERPL-SCNC: 26 MMOL/L — SIGNIFICANT CHANGE UP (ref 22–31)
CREAT SERPL-MCNC: 1.07 MG/DL — SIGNIFICANT CHANGE UP (ref 0.5–1.3)
EGFR: 91 ML/MIN/1.73M2 — SIGNIFICANT CHANGE UP
GLUCOSE SERPL-MCNC: 143 MG/DL — HIGH (ref 70–99)
HCT VFR BLD CALC: 36.8 % — LOW (ref 39–50)
HGB BLD-MCNC: 12.3 G/DL — LOW (ref 13–17)
LEGIONELLA AG UR QL: NEGATIVE — SIGNIFICANT CHANGE UP
LEGIONELLA AG UR QL: NEGATIVE — SIGNIFICANT CHANGE UP
MCHC RBC-ENTMCNC: 33.2 PG — SIGNIFICANT CHANGE UP (ref 27–34)
MCHC RBC-ENTMCNC: 33.4 G/DL — SIGNIFICANT CHANGE UP (ref 32–36)
MCV RBC AUTO: 99.5 FL — SIGNIFICANT CHANGE UP (ref 80–100)
NRBC # BLD: 0 /100 WBCS — SIGNIFICANT CHANGE UP (ref 0–0)
PLATELET # BLD AUTO: 172 K/UL — SIGNIFICANT CHANGE UP (ref 150–400)
POTASSIUM SERPL-MCNC: 3.2 MMOL/L — LOW (ref 3.5–5.3)
POTASSIUM SERPL-SCNC: 3.2 MMOL/L — LOW (ref 3.5–5.3)
RBC # BLD: 3.7 M/UL — LOW (ref 4.2–5.8)
RBC # FLD: 13 % — SIGNIFICANT CHANGE UP (ref 10.3–14.5)
SODIUM SERPL-SCNC: 138 MMOL/L — SIGNIFICANT CHANGE UP (ref 135–145)
TROPONIN I, HIGH SENSITIVITY RESULT: 28.9 NG/L — SIGNIFICANT CHANGE UP
WBC # BLD: 9.84 K/UL — SIGNIFICANT CHANGE UP (ref 3.8–10.5)
WBC # FLD AUTO: 9.84 K/UL — SIGNIFICANT CHANGE UP (ref 3.8–10.5)

## 2022-03-14 PROCEDURE — 99232 SBSQ HOSP IP/OBS MODERATE 35: CPT

## 2022-03-14 PROCEDURE — 93010 ELECTROCARDIOGRAM REPORT: CPT

## 2022-03-14 RX ORDER — ACETAMINOPHEN 500 MG
650 TABLET ORAL EVERY 6 HOURS
Refills: 0 | Status: DISCONTINUED | OUTPATIENT
Start: 2022-03-14 | End: 2022-03-18

## 2022-03-14 RX ORDER — BUDESONIDE AND FORMOTEROL FUMARATE DIHYDRATE 160; 4.5 UG/1; UG/1
2 AEROSOL RESPIRATORY (INHALATION)
Refills: 0 | Status: DISCONTINUED | OUTPATIENT
Start: 2022-03-14 | End: 2022-03-18

## 2022-03-14 RX ORDER — POTASSIUM CHLORIDE 20 MEQ
40 PACKET (EA) ORAL ONCE
Refills: 0 | Status: COMPLETED | OUTPATIENT
Start: 2022-03-14 | End: 2022-03-14

## 2022-03-14 RX ADMIN — Medication 1 APPLICATION(S): at 05:28

## 2022-03-14 RX ADMIN — Medication 3 MILLILITER(S): at 13:14

## 2022-03-14 RX ADMIN — Medication 650 MILLIGRAM(S): at 11:58

## 2022-03-14 RX ADMIN — Medication 40 MILLIGRAM(S): at 18:07

## 2022-03-14 RX ADMIN — Medication 3 MILLILITER(S): at 09:29

## 2022-03-14 RX ADMIN — Medication 650 MILLIGRAM(S): at 11:17

## 2022-03-14 RX ADMIN — Medication 650 MILLIGRAM(S): at 23:19

## 2022-03-14 RX ADMIN — Medication 1 APPLICATION(S): at 19:05

## 2022-03-14 RX ADMIN — AZITHROMYCIN 500 MILLIGRAM(S): 500 TABLET, FILM COATED ORAL at 11:17

## 2022-03-14 RX ADMIN — TIOTROPIUM BROMIDE 1 CAPSULE(S): 18 CAPSULE ORAL; RESPIRATORY (INHALATION) at 11:17

## 2022-03-14 RX ADMIN — Medication 25 MILLIGRAM(S): at 13:16

## 2022-03-14 RX ADMIN — Medication 40 MILLIEQUIVALENT(S): at 18:07

## 2022-03-14 RX ADMIN — HEPARIN SODIUM 5000 UNIT(S): 5000 INJECTION INTRAVENOUS; SUBCUTANEOUS at 18:07

## 2022-03-14 RX ADMIN — Medication 40 MILLIGRAM(S): at 05:25

## 2022-03-14 RX ADMIN — CEFTRIAXONE 100 MILLIGRAM(S): 500 INJECTION, POWDER, FOR SOLUTION INTRAMUSCULAR; INTRAVENOUS at 14:30

## 2022-03-14 RX ADMIN — Medication 3 MILLILITER(S): at 00:14

## 2022-03-14 RX ADMIN — HEPARIN SODIUM 5000 UNIT(S): 5000 INJECTION INTRAVENOUS; SUBCUTANEOUS at 05:25

## 2022-03-14 RX ADMIN — Medication 3 MILLILITER(S): at 17:59

## 2022-03-14 NOTE — PROGRESS NOTE ADULT - SUBJECTIVE AND OBJECTIVE BOX
ANGELES AVALOS    LVS 2C 243 D    Allergies    No Known Drug Allergies  shellfish (Angioedema)    Intolerances        PAST MEDICAL & SURGICAL HISTORY:  HTN (hypertension)    Asthma        FAMILY HISTORY:      Home Medications:      MEDICATIONS  (STANDING):  albuterol/ipratropium for Nebulization 3 milliLiter(s) Nebulizer every 6 hours  azithromycin   Tablet 500 milliGRAM(s) Oral daily  cefTRIAXone   IVPB 1000 milliGRAM(s) IV Intermittent every 24 hours  furosemide   Injectable 40 milliGRAM(s) IV Push two times a day  heparin   Injectable 5000 Unit(s) SubCutaneous every 12 hours  hydrOXYzine hydrochloride 25 milliGRAM(s) Oral daily  influenza   Vaccine 0.5 milliLiter(s) IntraMuscular once  tiotropium 18 MICROgram(s) Capsule 1 Capsule(s) Inhalation daily  triamcinolone 0.1% Ointment 1 Application(s) Topical every 12 hours    MEDICATIONS  (PRN):  acetaminophen     Tablet .. 650 milliGRAM(s) Oral every 6 hours PRN Temp greater or equal to 38C (100.4F), Moderate Pain (4 - 6)  LORazepam     Tablet 2 milliGRAM(s) Oral every 2 hours PRN Symptom-triggered 2 point increase in CIWA-Ar      Diet, Regular (03-12-22 @ 08:33) [Active]          Vital Signs Last 24 Hrs  T(C): 36.4 (14 Mar 2022 11:03), Max: 36.9 (13 Mar 2022 15:37)  T(F): 97.6 (14 Mar 2022 11:03), Max: 98.5 (13 Mar 2022 15:37)  HR: 97 (14 Mar 2022 11:03) (84 - 105)  BP: 125/72 (14 Mar 2022 11:03) (125/72 - 145/98)  BP(mean): --  RR: 18 (14 Mar 2022 11:03) (18 - 20)  SpO2: 96% (14 Mar 2022 11:03) (86% - 99%)      03-13-22 @ 07:01  -  03-14-22 @ 07:00  --------------------------------------------------------  IN: 100 mL / OUT: 2200 mL / NET: -2100 mL    03-14-22 @ 07:01  -  03-14-22 @ 11:27  --------------------------------------------------------  IN: 360 mL / OUT: 2500 mL / NET: -2140 mL              LABS:                        12.3   9.84  )-----------( 172      ( 14 Mar 2022 06:43 )             36.8     03-14    138  |  105  |  22  ----------------------------<  143<H>  3.2<L>   |  26  |  1.07    Ca    9.5      14 Mar 2022 06:43  Phos  2.5     03-13  Mg     2.2     03-13    TPro  7.6  /  Alb  3.1<L>  /  TBili  0.7  /  DBili  0.2  /  AST  94<H>  /  ALT  87<H>  /  AlkPhos  141<H>  03-12          ABG - ( 12 Mar 2022 18:53 )  pH, Arterial: x     pH, Blood: 7.47  /  pCO2: 33    /  pO2: 114   / HCO3: 24    / Base Excess: 0.9   /  SaO2: 99.3                WBC:  WBC Count: 9.84 K/uL (03-14 @ 06:43)  WBC Count: 7.60 K/uL (03-13 @ 07:40)  WBC Count: 8.20 K/uL (03-12 @ 06:08)      MICROBIOLOGY:  RECENT CULTURES:                  Sodium:  Sodium, Serum: 138 mmol/L (03-14 @ 06:43)  Sodium, Serum: 137 mmol/L (03-13 @ 07:40)  Sodium, Serum: 138 mmol/L (03-12 @ 18:26)  Sodium, Serum: 136 mmol/L (03-12 @ 06:08)      1.07 mg/dL 03-14 @ 06:43  1.05 mg/dL 03-13 @ 07:40  1.22 mg/dL 03-12 @ 18:26  1.04 mg/dL 03-12 @ 06:08      Hemoglobin:  Hemoglobin: 12.3 g/dL (03-14 @ 06:43)  Hemoglobin: 11.3 g/dL (03-13 @ 07:40)  Hemoglobin: 13.2 g/dL (03-12 @ 06:08)      Platelets: Platelet Count - Automated: 172 K/uL (03-14 @ 06:43)  Platelet Count - Automated: 123 K/uL (03-13 @ 07:40)  Platelet Count - Automated: 125 K/uL (03-12 @ 06:08)      LIVER FUNCTIONS - ( 12 Mar 2022 18:26 )  Alb: 3.1 g/dL / Pro: 7.6 gm/dL / ALK PHOS: 141 U/L / ALT: 87 U/L / AST: 94 U/L / GGT: x                 RADIOLOGY & ADDITIONAL STUDIES:      MICROBIOLOGY:  RECENT CULTURES:

## 2022-03-14 NOTE — PROGRESS NOTE ADULT - SUBJECTIVE AND OBJECTIVE BOX
Patient is a 41y old  Male who presents with a chief complaint of sob (13 Mar 2022 11:50)       INTERVAL HPI/OVERNIGHT EVENTS: Pt doing well, but states that when talking for too long--sob comes back. Continues to be on 2L       REVIEW OF SYSTEMS:   Remaining ROS negative    Home Medications:        MEDICATIONS  (STANDING):  albuterol/ipratropium for Nebulization 3 milliLiter(s) Nebulizer every 6 hours  azithromycin   Tablet 500 milliGRAM(s) Oral daily  budesonide 160 MICROgram(s)/formoterol 4.5 MICROgram(s) Inhaler 2 Puff(s) Inhalation two times a day  cefTRIAXone   IVPB 1000 milliGRAM(s) IV Intermittent every 24 hours  furosemide   Injectable 40 milliGRAM(s) IV Push two times a day  heparin   Injectable 5000 Unit(s) SubCutaneous every 12 hours  hydrOXYzine hydrochloride 25 milliGRAM(s) Oral daily  influenza   Vaccine 0.5 milliLiter(s) IntraMuscular once  methylPREDNISolone sodium succinate Injectable 20 milliGRAM(s) IV Push three times a day  tiotropium 18 MICROgram(s) Capsule 1 Capsule(s) Inhalation daily  triamcinolone 0.1% Ointment 1 Application(s) Topical every 12 hours    MEDICATIONS  (PRN):  LORazepam     Tablet 2 milliGRAM(s) Oral every 2 hours PRN Symptom-triggered 2 point increase in CIWA-Ar      Allergies    No Known Drug Allergies  shellfish (Angioedema)    Intolerances        ICU Vital Signs Last 24 Hrs  T(C): 36.8 (14 Mar 2022 16:21), Max: 36.9 (14 Mar 2022 05:13)  T(F): 98.2 (14 Mar 2022 16:21), Max: 98.4 (14 Mar 2022 05:13)  HR: 89 (14 Mar 2022 16:21) (84 - 105)  BP: 133/84 (14 Mar 2022 16:21) (125/72 - 145/98)  BP(mean): --  ABP: --  ABP(mean): --  RR: 17 (14 Mar 2022 16:21) (17 - 20)  SpO2: 96% (14 Mar 2022 16:21) (91% - 99%)        PHYSICAL EXAM:  GENERAL: NAD  HEAD:  Atraumatic, Normocephalic  EYES: EOMI, PERRLA, conjunctiva and sclera clear  ENT: O/P Clear  NECK: Supple, No JVD  NERVOUS SYSTEM:  No focal deficits  CHEST/LUNG: Clear to percussion bilaterally; No rales, rhonchi, wheezing  HEART: Regular rate and rhythm; No murmurs, rubs, or gallops  ABDOMEN: Soft, Nontender, Nondistended; Bowel sounds present  EXTREMITIES:  2+ Peripheral Pulses, No clubbing, cyanosis, trace B/L LE edema  SKIN: No rashes or lesions    LABS:                                          12.3   9.84  )-----------( 172      ( 14 Mar 2022 06:43 )             36.8     03-14    138  |  105  |  22  ----------------------------<  143<H>  3.2<L>   |  26  |  1.07    Ca    9.5      14 Mar 2022 06:43  Phos  2.5     03-13  Mg     2.2     03-13    TPro  7.6  /  Alb  3.1<L>  /  TBili  0.7  /  DBili  0.2  /  AST  94<H>  /  ALT  87<H>  /  AlkPhos  141<H>  03-12

## 2022-03-14 NOTE — CONSULT NOTE ADULT - ASSESSMENT
Dyspnea  Doubt CV issue at present  may have had some vague discomfort per ER, patient states that this was just his usual asthma exacerbation  will repeat troponin  attempting to get cath report  BNP high, CT suggests pulmonary edema.  EF slightly low on echo which may reflect acute exacerbation of asthma and/or fluid overload    EF will need to be reassessed when stable (weeks)  beta blocker not appropriate, nor is GDMT in this setting  repeat CXR re: clearing, repeat BNP, follow on current rx for now.  
      ROBBIE REYNOSO 41 F 3/12/2022 1980 DR DARRON NELSON    Initial evaluation/Pulmonary Critical Care consultation requested on  3/12/2022 by Dr DARRON NELSON   from Dr Horowitz   Patient examined chart reviewed    HOSPITAL ADMISSION   PATIENT CAME  FROM (if information available)      ROBBIE REYNOSO 41 F 3/12/2022 1980 DR DARRON NELSON    REVIEW OF SYMPTOMS      Able to give ROS  Yes     RELIABLE +/-   CONSTITUTIONAL Weakness Yes  Chills No   ENDOCRINE  No heat or cold intolerance    ALLERGY No hives  Sore throat No stridor  RESP Coughing blood no  Shortness of breath YES   NEURO No Headache  Confusion Pain neck No   CARDIAC No Chest pain No Palpitations   GI  Pain abdomen NO   Vomiting NO     PHYSICAL EXAM    HEENT Unremarkable  atraumatic   RESP Fair air entry EXP prolonged    Harsh breath sound Resp distres mild   CARDIAC S1 S2 No S3     NO JVD    ABDOMEN SOFT BS PRESENT NOT DISTENDED No hepatosplenomegaly PEDAL EDEMA present No calf tenderness  NO rash       PATIENT PRESENTATION.  3/12/2022  41M with hx of asthma w/ no previous intubations in the past for asthma, hospitalized in the past for asthma exacerbation p/w via EMS for asthma exacerbation today - given 12 mg Decadron, duonebs x 2, epipen - tachypneic w/ bilateral diffuse coarse wheezing bilaterally. started on bipap in the ED, denies sick contacts. admits to non-productive cough. no fevers/chills, no other complaints. also admit to chest tightness that he feels w/ previous asthma exacerbations  Pulm consulted 3/12/2022                                           DOA/CC/PROBLEMS poa .  3/12/2022   41 m  asthma ex    PMH-PSH .  pmh asthma    PROBLEMS .  Asthma exacerbation      COVID/ICU/CODE STATUS.                       COVID  STATUS.   3/12/2022 scv2 (-)         ICU STAY. none  GOC.               3/12/2022 full code    BEST PRACTICE ISSUES.                                                  HEAD OF BED ELEVATION. Yes  DVT PROPHYLAXIS.    3/12/2022 hpsc   BRAVO PROPHYLAXIS.                                                                                        DIET.   3/12/2022 regl             GENERAL ISSUES .                                       ALLERGY.        shellfish                    WEIGHT.             3/12/2022 81                       BMI.                        3/12/2022 34           VITALS/PO/IO/VENT/DRIPS.     3/12/2022 afeb 98 110/68         PROBLEM DATA/PLAN.    HEMODYNAMICS.   Monitor bp Target MAP 65 (+)    RESP.   Monitor po Target po 90-95%      PMH/PSH PROBLEMS.  Management continued/modified as indicated    OXYGEN REQUIREMENTS.  3/12/2022 3l 98%  Will need home oxygen at discharge if ra rest or ra exercise PO drops below 88%      INFECTION.  W 3/12/2022 w 8.2   cxr 3/12/2022 hazy airspcae dis rll  FLU ab 3/12/2022 (-)   abio 3/12/2022 rocephin   AR  3/12/2022 check procalkc  3/12/2022 check ct ch    ASTHMA ex.  3/12/2022 duoneb.4   3/12/2022 symbicort 80   3/12/2022 solumed 20.3   AR  3/12/2022 added spiriva  3/12/2022 Checnged to symbicort 160    RESP FAILURE  3/12/2022 6a abg 40% 747/32/197   ar  hypoxemic resp failure  o2 suppl    CHF  bnp 3/12/2022 bnp 3696   3/12/2022 Check echo    THROMBOCYTOPENIA   plt 3/12/2022 plt 125   monitor    TIME SPENT   Over 55 minutes aggregate care time spent on encounter; activities included   direct patient care, counseling and/or coordinating care reviewing notes, lab data/ imaging , discussion with multidisciplinary team/ patient  /family and explaining in detail risks, benefits, alternatives  of the recommendations     ROBBIE REYNOSO 41 F 3/12/2022 1980 DR DARRON NELSON

## 2022-03-14 NOTE — PROGRESS NOTE ADULT - ASSESSMENT
DOA/CC/PROBLEMS poa .  3/12/2022   41 m  asthma ex    PMH-PSH .  pmh asthma  pmh     PROBLEMS .  Asthma exacerbation  CHF   Pneumonia       COVID/ICU/CODE STATUS.                       COVID  STATUS.   3/12/2022 scv2 (-)         ICU STAY. none  GOC.               3/12/2022 full code    BEST PRACTICE ISSUES.                                                  HEAD OF BED ELEVATION. Yes  DVT PROPHYLAXIS.    3/12/2022 hpsc   BRAVO PROPHYLAXIS.                                                                                        DIET.   3/12/2022 reglr    GENERAL ISSUES .                                       ALLERGY.        shellfish                    WEIGHT.             3/12/2022 81                       BMI.                        3/12/2022 34           VITALS/PO/IO/VENT/DRIPS.     3/14/2022 afeb 89 130/80      PROBLEM DATA/PLAN.    HEMODYNAMICS.   Monitor bp Target MAP 65 (+)    RESP.   Monitor po Target po 90-95%      PMH/PSH PROBLEMS.  Management continued/modified as indicated    OXYGEN REQUIREMENTS.  3/14/2022 2l 98%     INFECTION.  W 3/12-3/13/2022 w 8.2 - 7.6  pr 3/14/2022 pr .3    cxr 3/12/2022 hazy airspcae dis rll  FLU ab 3/12/2022 (-)   ct ch 3/13/2022 pneumonia chf  needs followup ct ch  abio 3/12/2022 rocephin   3/13/2022 azithro   3/12 rocephin     RESP FAILURE  3/12/2022 6a abg 40% 747/32/197   ar  hypoxemic resp failure  o2 suppl      ASTHMA ex.  HO intubation for asthma   ho childhood asthma   ho evcazema   3/14/2022 symbicort 160  3/14/2022 pred 20  312 spiriva   Cont rx   given above hx he represents high risk pt so would continue asthgma rx       RO VTE  3/13/2022 v duplx (-)    CHF  ct ch 3/13/2022 pneumonia chf  needs followup ct ch  bnp 3/12/2022 bnp 3696   3/12/2022  echoef 45% elevated lap dd2 mod enlarged la   3/13-3/14/2022 kasix 40.2       THROMBOCYTOPENIA   plt 3/12/2022 plt 125   hit 3/13/2022 (-)   monitor    TIME SPENT   Over 25 minutes aggregate care time spent on encounter; activities included   direct patient care, counseling and/or coordinating care reviewing notes, lab data/ imaging , discussion with multidisciplinary team/ patient  /family and explaining in detail risks, benefits, alternatives  of the recommendations     ROBBIE REYNOSO 41 F 3/12/2022 1980 DR DARRON NESLON

## 2022-03-14 NOTE — CONSULT NOTE ADULT - SUBJECTIVE AND OBJECTIVE BOX
ANGELES AVALOS    LVS 2C 243 D    Allergies    No Known Drug Allergies  shellfish (Angioedema)    Intolerances        PAST MEDICAL & SURGICAL HISTORY:  HTN (hypertension)    Asthma        FAMILY HISTORY:      Home Medications:      MEDICATIONS  (STANDING):  albuterol/ipratropium for Nebulization 3 milliLiter(s) Nebulizer every 6 hours  budesonide 160 MICROgram(s)/formoterol 4.5 MICROgram(s) Inhaler 2 Puff(s) Inhalation two times a day  cefTRIAXone   IVPB 1000 milliGRAM(s) IV Intermittent every 24 hours  heparin   Injectable 5000 Unit(s) SubCutaneous every 12 hours  hydrOXYzine hydrochloride 25 milliGRAM(s) Oral daily  influenza   Vaccine 0.5 milliLiter(s) IntraMuscular once  methylPREDNISolone sodium succinate Injectable 20 milliGRAM(s) IV Push three times a day  tiotropium 18 MICROgram(s) Capsule 1 Capsule(s) Inhalation daily  triamcinolone 0.1% Ointment 1 Application(s) Topical every 12 hours    MEDICATIONS  (PRN):  LORazepam     Tablet 2 milliGRAM(s) Oral every 2 hours PRN Symptom-triggered 2 point increase in CIWA-Ar      Diet, Regular (03-12-22 @ 08:33) [Active]          Vital Signs Last 24 Hrs  T(C): 36.4 (12 Mar 2022 16:25), Max: 37.3 (12 Mar 2022 05:26)  T(F): 97.5 (12 Mar 2022 16:25), Max: 99.1 (12 Mar 2022 05:26)  HR: 97 (12 Mar 2022 16:25) (87 - 137)  BP: 138/98 (12 Mar 2022 16:25) (105/68 - 142/86)  BP(mean): --  RR: 18 (12 Mar 2022 16:25) (13 - 40)  SpO2: 97% (12 Mar 2022 16:25) (97% - 100%)      03-12-22 @ 06:01  -  03-12-22 @ 16:49  --------------------------------------------------------  IN: 950 mL / OUT: 1100 mL / NET: -150 mL              LABS:                        13.2   8.20  )-----------( 125      ( 12 Mar 2022 06:08 )             40.2     03-12    136  |  103  |  16  ----------------------------<  103<H>  3.6   |  26  |  1.04    Ca    7.9<L>      12 Mar 2022 06:08    TPro  7.5  /  Alb  3.1<L>  /  TBili  0.9  /  DBili  x   /  AST  91<H>  /  ALT  74  /  AlkPhos  146<H>  03-12          ABG - ( 12 Mar 2022 06:03 )  pH, Arterial: 7.47  pH, Blood: x     /  pCO2: 32    /  pO2: 197   / HCO3: 23    / Base Excess: 0.2   /  SaO2: 100.0               WBC:  WBC Count: 8.20 K/uL (03-12 @ 06:08)      MICROBIOLOGY:  RECENT CULTURES:                  Sodium:  Sodium, Serum: 136 mmol/L (03-12 @ 06:08)      1.04 mg/dL 03-12 @ 06:08      Hemoglobin:  Hemoglobin: 13.2 g/dL (03-12 @ 06:08)      Platelets: Platelet Count - Automated: 125 K/uL (03-12 @ 06:08)      LIVER FUNCTIONS - ( 12 Mar 2022 06:08 )  Alb: 3.1 g/dL / Pro: 7.5 gm/dL / ALK PHOS: 146 U/L / ALT: 74 U/L / AST: 91 U/L / GGT: x                 RADIOLOGY & ADDITIONAL STUDIES:      MICROBIOLOGY:  RECENT CULTURES:          
     41 year old male with lifelong asthma, including hospitalizations for same, admitted via ambulance with exacerbation  In ER received dexamethasone, bronchodilators, Bipap, Epipen ?  No chest pain  Told of abnormal ECG in past  [facility] about a [facility], had cardiac cath, uncertain of results, not placed on meds as per patient;  called, attempting to retrieve report    Not ill recently, symptoms started fairly abruptly.      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    Patient History:    Tobacco Screening:  · Core Measure Site	Yes  · Has the patient used tobacco in the past 30 days?	No    Risk Assessment:    Present on Admission:  Deep Venous Thrombosis	no  Pulmonary Embolus	no  Urinary Catheter	no  Central Venous Catheter/PICC Line	no  Surgical Site Incision	no  Pressure Ulcer(s)	no     Heart Failure:  Does this patient have a history of or has been diagnosed with heart failure? no.    HIV Screen (per [facility] Department of Health, HIV screening must be offered to every individual between ages 13 and 64)	Offered and patient declined        Physical Exam:   VS per EHR  skin warm, dry  jvp nl  carotids nl  lungs good bs bilaterally, no wheeze  no S3, rub, normal S1S2  abd nontender, bs nl  no edema         Labs and Results:  Labs, Radiology, Cardiology, and Other Results: LABS:                        13.2   8.20  )-----------( 125      ( 12 Mar 2022 06:08 )             40.2     03-12    136  |  103  |  16  ----------------------------<  103<H>  3.6   |  26  |  1.04    Ca    7.9<L>      12 Mar 2022 06:08    TPro  7.5  /  Alb  3.1<L>  /  TBili  0.9  /  DBili  x   /  AST  91<H>  /  ALT  74  /  AlkPhos  146<H>  12      ABG - ( 12 Mar 2022 06:03 )  pH, Arterial: 7.47  pH, Blood: x     /  pCO2: 32    /  pO2: 197   / HCO3: 23    / Base Excess: 0.2   /  SaO2: 100.0     12 @ 05:54  Performed In Lab  --       Trop 58 on adm  PCT 0.30             ACC: [id number] EXAM:  CT CHEST                          PROCEDURE DATE:  2022          INTERPRETATION:  Clinical indication: Evaluate for pleural effusion.    Axial CT images of the chest are obtained without intravenous   administration of contrast.    No prior chest CTs are available for comparison.    Multiple small or benign appearing bilateral axillary lymph nodes. No   enlarged mediastinal or hilar lymph nodes.    Cardiomegaly. No pericardial effusion.    Trace bilateral pleural effusions.    Evaluation of the upper abdomen demonstrate a 2 cm indeterminate right   adrenal gland nodule.    Evaluation of the lungs demonstrate bilateral lower lung predominance   interlobular septal thickening with some superimposed bilateral   ill-defined patchy groundglass opacities representing pulmonary edema.   Other bilateral upper lung predominant more nodular appearing dense or   rounded groundglass opacities are also noted which may represent a   superimposed infection.    No traction bronchiectasis or honeycombing. Right lower lobe peripheral   area of linear atelectasis.    No significant bony abnormality.    IMPRESSION: Bilateral interlobular septal thickening with superimposed   ill-defined areas of groundglass representing pulmonary edema.    Cardiomegaly and trace pleural effusions.    Superimposed bilateral nodular dense and groundglass opacities may   represent superimposed infection.    A 1-3 month follow-up noncontrast chest CT is recommended to ensure   resolution.    Indeterminate 2 cm right adrenal gland nodule. Nonemergent noncontrast   adrenal gland MRI is recommended for further evaluation.    --- End of Report ---      ECG:  NSR, diffuse ST/T abnormality  Telemetry unremarkable        CHARLY PUGH MD; Attending Radiologist  This document has been electronically signed. Mar 13 2022 10:35AM  ACC: [id number] EXAM:  ECHO TTE WO CON COMP W DOPP                          PROCEDURE DATE:  2022          INTERPRETATION:  TRANSTHORACIC ECHOCARDIOGRAM REPORT        Patient Name:   ANGELES AVALOS Patient Location: [facility] Rec #:  [id number]     Accession #:      [id number]  Account #:                     Height:           72.0 in 182.9 cm  YOB: 1980     Weight:           178.6 lb 81.00 kg  Patient Age:    41 years       BSA:              2.03 m²  Patient Gender: M      BP:               124/80 mmHg      Date of Exam:        3/13/2022 8:47:15 AM  Sonographer:         RJ  Referring Physician: DARRON NELSON    Procedure:   2D Echo/Doppler/Color Doppler Complete.  Indications: Dyspnea, unspecified - R06.00  Diagnosis:   Dyspnea, unspecified - R06.00        2D AND M-MODE MEASUREMENTS (normal ranges within parentheses):  Left                 Normal   Aorta/Left            Normal  Ventricle:                    Atrium:  IVSd (2D):    1.14  (0.7-1.1) Aortic Root  3.88  (2.4-3.7)                 cm             (2D):           cm  LVPWd (2D):   1.07  (0.7-1.1) Left Atrium    3.95  (1.9-4.0)                 cm             (2D):           cm  LVIDd (2D):   5.80  (3.4-5.7) LA Vol Index   54.7                 cm           (A4C):        ml/m²  LVIDs (2D):   4.22            LA Vol Index   52.6                 cm             (A2C):        ml/m²  LV FS (2D):   27.4   (>25%)   LA Vol Index   54.1                  %             (BP):         ml/m²  Relative Wall 0.37   (<0.42)  Right  Thickness                     Ventricle:                                TAPSE:          1.54 cm    LV DIASTOLIC FUNCTION:  MV Peak E: 0.68 m/s Decel Time:  38 msec  MV Peak A: 0.92 m/s Septal E/e'  10.2  E/A Ratio: 0.74     Lateral E/e' 23.3  Septal e'  0.1 m/s  Lateral e' 0.0 m/s    SPECTRAL DOPPLER ANALYSIS (where applicable):  Mitral Valve:  MV P1/2 Time: 11.02 msec  MV Area, PHT: 19.96 cm²    Aortic Valve: AoV Max Reggie: 1.07 m/s AoV Peak P.6 mmHg AoV Mean P.8 mmHg    LVOT Vmax: 0.85 m/s LVOT VTI: 0.134 m LVOT Diameter: 2.27 cm    AoV Area, Vmax: 3.21 cm² AoV Area, VTI: 2.80 cm² AoV Area, Vmn: 2.43 cm²      PHYSICIAN INTERPRETATION:  Left Ventricle: The left ventricular internal cavity size is mildly   increased. Left ventricular wall thickness is normal.  Global LV systolic function was mildly decreased. Left ventricular   ejection fraction, by visual estimation, is 45 to 50%. Normal segmental   left ventricular systolic function. Spectral Doppler shows pseudonormal   pattern of left ventricular myocardial filling (Grade II diastolic   dysfunction). Elevated mean left atrial pressure.  Right Ventricle: Normal right ventricular size and function.  Left Atrium: The left atrium is normal in size. Moderately enlarged left   atrium.  Right Atrium: The right atrium is normal in size.  Pericardium: There is no evidence of pericardial effusion.  Mitral Valve: Structurally normal mitral valve, with normal leaflet   excursion. Mild mitral valve regurgitation is seen.  Tricuspid Valve: Structurally normal tricuspid valve, with normal leaflet   excursion. No tricuspid regurgitation is visualized.  Aortic Valve: Normal trileaflet aortic valve with normal opening. Peak   transaortic gradient equals 4.6 mmHg, mean transaortic gradient equals   2.8 mmHg, the calculated aortic valve area equals 2.80 cm² by the   continuity equation consistent with normally opening aortic valve. No   evidence of aortic valve regurgitation is seen.  Pulmonic Valve: Structurally normal pulmonic valve, with normal leaflet   excursion. Mild pulmonic valve regurgitation.  Aorta: The aortic root and ascending aorta are structurally normal, with   no evidence of dilitation. There is dilatation of the aortic root.  Pulmonary Artery: The main pulmonary artery is normal in size.      Summary:   1. Left ventricular ejection fraction, by visual estimation, is 45 to   50%.   2. Mildly decreased global left ventricular systolic function.   3. Elevated mean left atrial pressure.   4. Spectral Doppler shows pseudonormal pattern of left ventricular   myocardial filling (Grade II diastolic dysfunction).   5. Moderately enlarged left atrium.   6. Mild mitral valve regurgitation.   7. Mild pulmonic valve regurgitation.   8. Dilatation of the aortic root.      [id number] Ronnie Govea MD, [facility]  Electronically signed on 3/13/2022 at 4:55:00 PM            *** Final ***

## 2022-03-14 NOTE — PROGRESS NOTE ADULT - ASSESSMENT
41M   hx of  childhood  br  asthma w/ no previous intubations in the past for asthma, hospitalized in the past for asthma exacerbation/  h/o child horowitz  eczema  smokes  2  cigs/ day and also indulges  in etoh/  every  5  to 7  days pe r  pt, if  he  does  not, then per  pt , he gets seizures  pt is unemployed  and lives  with hie mother     p/w via EMS for asthma exacerbation today   per  ED  note, pt - was  given 12 mg Decadron, duonebs x 2, epipen - / s/p tachypnea  w/ bilateral   wheezing,  was  on bipap in the ED,   denies sick contacts./ no fevers/chills/ cp/ abd pain  covid negative       * SOB looks more like pulm yobani Vs PNA, less likely Asthma  Lasix 40 IV BID  Echo -(Grade II diastolic dysfunction).   Stop  solumedrol.  O2  nasal canula,  proventil nebs/ symbicort  pulm dr ernst  cardiology dr. Figueroa   C/w iv Rocephin, add zithromax for now. Would deescalate bases on his response to lasix  Ct chest w/ pulm edema and possible underlying infection    *  h/o c/c  eczema    on steroid   cream/ atarax  *  h/o etoh  indulgence/  last drink   was   about    a week  ago, per pt   on ciwa  protocol  / symptom  triggered ,  as  pt  . sometimes he gets  seizures  on dvt ppx/  s/q  heparin    Patient reports having a coronary angiogram done at [facility] in [place] recently, Will need to obtain records tomorrow.   Spoke with Dr. Figueroa (Cardiologist) will also see patient

## 2022-03-15 LAB
ALBUMIN SERPL ELPH-MCNC: 2.8 G/DL — LOW (ref 3.3–5)
ALP SERPL-CCNC: 111 U/L — SIGNIFICANT CHANGE UP (ref 40–120)
ALT FLD-CCNC: 62 U/L — SIGNIFICANT CHANGE UP (ref 12–78)
ANION GAP SERPL CALC-SCNC: 8 MMOL/L — SIGNIFICANT CHANGE UP (ref 5–17)
AST SERPL-CCNC: 33 U/L — SIGNIFICANT CHANGE UP (ref 15–37)
BILIRUB SERPL-MCNC: 0.7 MG/DL — SIGNIFICANT CHANGE UP (ref 0.2–1.2)
BUN SERPL-MCNC: 22 MG/DL — SIGNIFICANT CHANGE UP (ref 7–23)
CALCIUM SERPL-MCNC: 9.3 MG/DL — SIGNIFICANT CHANGE UP (ref 8.5–10.1)
CHLORIDE SERPL-SCNC: 102 MMOL/L — SIGNIFICANT CHANGE UP (ref 96–108)
CO2 SERPL-SCNC: 26 MMOL/L — SIGNIFICANT CHANGE UP (ref 22–31)
CREAT SERPL-MCNC: 0.88 MG/DL — SIGNIFICANT CHANGE UP (ref 0.5–1.3)
EGFR: 112 ML/MIN/1.73M2 — SIGNIFICANT CHANGE UP
GLUCOSE SERPL-MCNC: 161 MG/DL — HIGH (ref 70–99)
HCT VFR BLD CALC: 37.6 % — LOW (ref 39–50)
HGB BLD-MCNC: 12.8 G/DL — LOW (ref 13–17)
MCHC RBC-ENTMCNC: 33.1 PG — SIGNIFICANT CHANGE UP (ref 27–34)
MCHC RBC-ENTMCNC: 34 G/DL — SIGNIFICANT CHANGE UP (ref 32–36)
MCV RBC AUTO: 97.2 FL — SIGNIFICANT CHANGE UP (ref 80–100)
NRBC # BLD: 0 /100 WBCS — SIGNIFICANT CHANGE UP (ref 0–0)
NT-PROBNP SERPL-SCNC: 990 PG/ML — HIGH (ref 0–125)
PLATELET # BLD AUTO: 201 K/UL — SIGNIFICANT CHANGE UP (ref 150–400)
POTASSIUM SERPL-MCNC: 3.4 MMOL/L — LOW (ref 3.5–5.3)
POTASSIUM SERPL-SCNC: 3.4 MMOL/L — LOW (ref 3.5–5.3)
PROT SERPL-MCNC: 7.2 GM/DL — SIGNIFICANT CHANGE UP (ref 6–8.3)
RBC # BLD: 3.87 M/UL — LOW (ref 4.2–5.8)
RBC # FLD: 12.7 % — SIGNIFICANT CHANGE UP (ref 10.3–14.5)
SODIUM SERPL-SCNC: 136 MMOL/L — SIGNIFICANT CHANGE UP (ref 135–145)
WBC # BLD: 7.46 K/UL — SIGNIFICANT CHANGE UP (ref 3.8–10.5)
WBC # FLD AUTO: 7.46 K/UL — SIGNIFICANT CHANGE UP (ref 3.8–10.5)

## 2022-03-15 PROCEDURE — 99232 SBSQ HOSP IP/OBS MODERATE 35: CPT

## 2022-03-15 PROCEDURE — 71046 X-RAY EXAM CHEST 2 VIEWS: CPT | Mod: 26

## 2022-03-15 RX ORDER — FUROSEMIDE 40 MG
20 TABLET ORAL DAILY
Refills: 0 | Status: DISCONTINUED | OUTPATIENT
Start: 2022-03-15 | End: 2022-03-18

## 2022-03-15 RX ORDER — POTASSIUM CHLORIDE 20 MEQ
40 PACKET (EA) ORAL ONCE
Refills: 0 | Status: COMPLETED | OUTPATIENT
Start: 2022-03-15 | End: 2022-03-15

## 2022-03-15 RX ADMIN — BUDESONIDE AND FORMOTEROL FUMARATE DIHYDRATE 2 PUFF(S): 160; 4.5 AEROSOL RESPIRATORY (INHALATION) at 18:57

## 2022-03-15 RX ADMIN — Medication 20 MILLIGRAM(S): at 05:40

## 2022-03-15 RX ADMIN — Medication 1 APPLICATION(S): at 05:40

## 2022-03-15 RX ADMIN — Medication 3 MILLILITER(S): at 05:23

## 2022-03-15 RX ADMIN — TIOTROPIUM BROMIDE 1 CAPSULE(S): 18 CAPSULE ORAL; RESPIRATORY (INHALATION) at 12:09

## 2022-03-15 RX ADMIN — CEFTRIAXONE 100 MILLIGRAM(S): 500 INJECTION, POWDER, FOR SOLUTION INTRAMUSCULAR; INTRAVENOUS at 14:15

## 2022-03-15 RX ADMIN — Medication 25 MILLIGRAM(S): at 14:19

## 2022-03-15 RX ADMIN — BUDESONIDE AND FORMOTEROL FUMARATE DIHYDRATE 2 PUFF(S): 160; 4.5 AEROSOL RESPIRATORY (INHALATION) at 05:25

## 2022-03-15 RX ADMIN — Medication 3 MILLILITER(S): at 17:03

## 2022-03-15 RX ADMIN — Medication 3 MILLILITER(S): at 00:19

## 2022-03-15 RX ADMIN — HEPARIN SODIUM 5000 UNIT(S): 5000 INJECTION INTRAVENOUS; SUBCUTANEOUS at 18:59

## 2022-03-15 RX ADMIN — HEPARIN SODIUM 5000 UNIT(S): 5000 INJECTION INTRAVENOUS; SUBCUTANEOUS at 05:40

## 2022-03-15 RX ADMIN — Medication 1 APPLICATION(S): at 18:59

## 2022-03-15 RX ADMIN — Medication 3 MILLILITER(S): at 11:01

## 2022-03-15 RX ADMIN — Medication 40 MILLIEQUIVALENT(S): at 16:01

## 2022-03-15 RX ADMIN — AZITHROMYCIN 500 MILLIGRAM(S): 500 TABLET, FILM COATED ORAL at 12:09

## 2022-03-15 NOTE — PROGRESS NOTE ADULT - SUBJECTIVE AND OBJECTIVE BOX
ANGELES AVALOS    LVS 2C 242 I    Allergies    No Known Drug Allergies  shellfish (Angioedema)    Intolerances        PAST MEDICAL & SURGICAL HISTORY:  HTN (hypertension)    Asthma        FAMILY HISTORY:      Home Medications:      MEDICATIONS  (STANDING):  albuterol/ipratropium for Nebulization 3 milliLiter(s) Nebulizer every 6 hours  azithromycin   Tablet 500 milliGRAM(s) Oral daily  budesonide 160 MICROgram(s)/formoterol 4.5 MICROgram(s) Inhaler 2 Puff(s) Inhalation two times a day  cefTRIAXone   IVPB 1000 milliGRAM(s) IV Intermittent every 24 hours  heparin   Injectable 5000 Unit(s) SubCutaneous every 12 hours  hydrOXYzine hydrochloride 25 milliGRAM(s) Oral daily  influenza   Vaccine 0.5 milliLiter(s) IntraMuscular once  predniSONE   Tablet 20 milliGRAM(s) Oral daily  tiotropium 18 MICROgram(s) Capsule 1 Capsule(s) Inhalation daily  triamcinolone 0.1% Ointment 1 Application(s) Topical every 12 hours    MEDICATIONS  (PRN):  acetaminophen     Tablet .. 650 milliGRAM(s) Oral every 6 hours PRN Temp greater or equal to 38C (100.4F), Moderate Pain (4 - 6)  LORazepam     Tablet 2 milliGRAM(s) Oral every 2 hours PRN Symptom-triggered 2 point increase in CIWA-Ar      Diet, Regular (03-12-22 @ 08:33) [Active]          Vital Signs Last 24 Hrs  T(C): 36.4 (15 Mar 2022 06:24), Max: 36.8 (14 Mar 2022 16:21)  T(F): 97.6 (15 Mar 2022 06:24), Max: 98.2 (14 Mar 2022 16:21)  HR: 88 (15 Mar 2022 08:54) (87 - 99)  BP: 133/88 (15 Mar 2022 06:24) (124/82 - 133/88)  BP(mean): --  RR: 19 (15 Mar 2022 06:24) (17 - 19)  SpO2: 95% (15 Mar 2022 08:54) (95% - 100%)      03-14-22 @ 07:01  -  03-15-22 @ 07:00  --------------------------------------------------------  IN: 780 mL / OUT: 3500 mL / NET: -2720 mL              LABS:                        12.8   7.46  )-----------( 201      ( 15 Mar 2022 07:13 )             37.6     03-15    136  |  102  |  22  ----------------------------<  161<H>  3.4<L>   |  26  |  0.88    Ca    9.3      15 Mar 2022 07:13    TPro  7.2  /  Alb  2.8<L>  /  TBili  0.7  /  DBili  x   /  AST  33  /  ALT  62  /  AlkPhos  111  03-15              WBC:  WBC Count: 7.46 K/uL (03-15 @ 07:13)  WBC Count: 9.84 K/uL (03-14 @ 06:43)  WBC Count: 7.60 K/uL (03-13 @ 07:40)  WBC Count: 8.20 K/uL (03-12 @ 06:08)      MICROBIOLOGY:  RECENT CULTURES:        CARDIAC MARKERS ( 14 Mar 2022 17:54 )  x     / x     / 51 U/L / x     / x                Sodium:  Sodium, Serum: 136 mmol/L (03-15 @ 07:13)  Sodium, Serum: 138 mmol/L (03-14 @ 06:43)  Sodium, Serum: 137 mmol/L (03-13 @ 07:40)  Sodium, Serum: 138 mmol/L (03-12 @ 18:26)  Sodium, Serum: 136 mmol/L (03-12 @ 06:08)      0.88 mg/dL 03-15 @ 07:13  1.07 mg/dL 03-14 @ 06:43  1.05 mg/dL 03-13 @ 07:40  1.22 mg/dL 03-12 @ 18:26  1.04 mg/dL 03-12 @ 06:08      Hemoglobin:  Hemoglobin: 12.8 g/dL (03-15 @ 07:13)  Hemoglobin: 12.3 g/dL (03-14 @ 06:43)  Hemoglobin: 11.3 g/dL (03-13 @ 07:40)  Hemoglobin: 13.2 g/dL (03-12 @ 06:08)      Platelets: Platelet Count - Automated: 201 K/uL (03-15 @ 07:13)  Platelet Count - Automated: 172 K/uL (03-14 @ 06:43)  Platelet Count - Automated: 123 K/uL (03-13 @ 07:40)  Platelet Count - Automated: 125 K/uL (03-12 @ 06:08)      LIVER FUNCTIONS - ( 15 Mar 2022 07:13 )  Alb: 2.8 g/dL / Pro: 7.2 gm/dL / ALK PHOS: 111 U/L / ALT: 62 U/L / AST: 33 U/L / GGT: x                 RADIOLOGY & ADDITIONAL STUDIES:      MICROBIOLOGY:  RECENT CULTURES:

## 2022-03-15 NOTE — PROGRESS NOTE ADULT - ASSESSMENT
ROBBIE REYNOSO 41 F 3/12/2022 1980 DR DARRON NELSON    REVIEW OF SYMPTOMS      Able to give ROS  Yes     RELIABLE +/-   CONSTITUTIONAL Weakness Yes  Chills No   ENDOCRINE  No heat or cold intolerance    ALLERGY No hives  Sore throat No stridor  RESP Coughing blood no  Shortness of breath YES   NEURO No Headache  Confusion Pain neck No   CARDIAC No Chest pain No Palpitations   GI  Pain abdomen NO   Vomiting NO     PHYSICAL EXAM    HEENT Unremarkable  atraumatic   RESP Fair air entry EXP prolonged    Harsh breath sound Resp distres mild   CARDIAC S1 S2 No S3     NO JVD    ABDOMEN SOFT BS PRESENT NOT DISTENDED No hepatosplenomegaly PEDAL EDEMA present No calf tenderness  NO rash     DOA/CC/PROBLEMS poa .  3/12/2022   41 m  asthma ex    PMH-PSH .  pmh asthma  pmh     PROBLEMS .  Asthma exacerbation  HFREF ef 45%    HO ETOH ABUSE Cmpthy   Pneumonia       COVID/ICU/CODE STATUS.                       COVID  STATUS.   3/12/2022 scv2 (-)         ICU STAY. none  GOC.               3/12/2022 full code    BEST PRACTICE ISSUES.                                                  HEAD OF BED ELEVATION. Yes  DVT PROPHYLAXIS.    3/12/2022 hpsc   BRAVO PROPHYLAXIS.                                                                                        DIET.   3/12/2022 reglr             VITALS/PO/IO/VENT/DRIPS.     3/15/2022 afeb 100 100/70      PROBLEM DATA/PLAN.    HEMODYNAMICS.   Monitor bp Target MAP 65 (+)    RESP.   Monitor po Target po 90-95%      PMH/PSH PROBLEMS.  Management continued/modified as indicated    OXYGEN REQUIREMENTS.  3/15/2022 2l 97%     INFECTION.  W 3/12-3/13-3/15/2022 w 8.2 - 7.6- 7.4   pr 3/14/2022 pr .3    rvp 3/12 (-)   cxr 3/12/2022 hazy airspcae dis rll  FLU ab 3/12/2022 (-)   legn 3/14 (-)   ct ch 3/13/2022 pneumonia chf  needs followup ct ch  abio 3/12/2022 rocephin   3/13-3/15/2022 azithro dced   3/12 rocephin     RESP FAILURE  3/12/2022 6a abg 40% 747/32/197   ar  hypoxemic resp failure  o2 suppl      ASTHMA ex.  HO intubation for asthma   ho childhood asthma   ho evcazema   3/14/2022 symbicort 160  3/14/2022 pred 20  312 spiriva   Cont rx   given above hx he represents high risk pt so would continue asthgma rx     RO VTE  3/13/2022 v duplx (-)    CHF  ho etoh abuse   ho (-) angiogram x 2   ct ch 3/13/2022 pneumonia chf  needs followup ct ch  bnp 3/12-3/15/2022 bnp 3696 - 990  3/12/2022  echoef 45% elevated lap dd2 mod enlarged la   3/15/2022 lasix 20   3/13-3/14/2022 kasix 40.2       THROMBOCYTOPENIA   plt 3/12/2022 plt 125   hit 3/13/2022 (-)   monitor    TIME SPENT   Over 25 minutes aggregate care time spent on encounter; activities included   direct patient care, counseling and/or coordinating care reviewing notes, lab data/ imaging , discussion with multidisciplinary team/ patient  /family and explaining in detail risks, benefits, alternatives  of the recommendations     ROBBIE REYNOSO 41 F 3/12/2022 1980 DR DARRON NELSON

## 2022-03-15 NOTE — PROGRESS NOTE ADULT - ASSESSMENT
41M   hx of  childhood  br  asthma w/ no previous intubations in the past for asthma, hospitalized in the past for asthma exacerbation/  h/o child horowitz  eczema  smokes  2  cigs/ day and also indulges  in etoh/  every  5  to 7  days pe r  pt, if  he  does  not, then per  pt , he gets seizures  pt is unemployed  and lives  with hie mother     p/w via EMS for asthma exacerbation today   per  ED  note, pt - was  given 12 mg Decadron, duonebs x 2, epipen - / s/p tachypnea  w/ bilateral   wheezing,  was  on bipap in the ED,   denies sick contacts./ no fevers/chills/ cp/ abd pain  covid negative    Hypoxia of unclear etiology  Hx of Asthma and intubations in past  Pulmonary edema  Questionable PNA   Grade 2 diastolic dysfunction on echo  Ct chest w/ pulm edema and possible underlying infection    -as per Cardiology less likely cardiac etiology, will obtain records from [facility] -pt had a cath performed Lasix d/raul  -as per Pulmonary-possibly component of asthma on top of pulm edema, continue steroids and nebs  - continue Rocephin day 3/5 , azithromycin d/c           *  h/o c/c  eczema    on steroid   cream/ atarax      *  h/o etoh  indulgence/ smoking/ cocaine abuse (rarely a per pt)- last drink   was   about    a week  ago, per pt   on [facility]wa  protocol  / symptom  triggered ,  as  pt  . sometimes he gets  seizures  -no withdrawal symptoms seen     on dvt ppx/  s/q  heparin

## 2022-03-15 NOTE — PHARMACOTHERAPY INTERVENTION NOTE - COMMENTS
Recommended potassium repletion for 3.4 with AM labs.
Recommended Zithromax stop date adjustment to account for 5 days of therapy.
Recommended to d/c azithromycin since legionella negative.
Spoke to MD and does not want BP parameters on furosemide order.

## 2022-03-15 NOTE — PROGRESS NOTE ADULT - SUBJECTIVE AND OBJECTIVE BOX
Patient is a 41y old  Male who presents with a chief complaint of sob (13 Mar 2022 11:50)       INTERVAL HPI/OVERNIGHT EVENTS: Pt states that feels better today, but when walking to the bathroom still feels sob      REVIEW OF SYSTEMS:   Remaining ROS negative    Home Medications:        MEDICATIONS  (STANDING):  albuterol/ipratropium for Nebulization 3 milliLiter(s) Nebulizer every 6 hours  azithromycin   Tablet 500 milliGRAM(s) Oral daily  budesonide 160 MICROgram(s)/formoterol 4.5 MICROgram(s) Inhaler 2 Puff(s) Inhalation two times a day  cefTRIAXone   IVPB 1000 milliGRAM(s) IV Intermittent every 24 hours  furosemide   Injectable 40 milliGRAM(s) IV Push two times a day  heparin   Injectable 5000 Unit(s) SubCutaneous every 12 hours  hydrOXYzine hydrochloride 25 milliGRAM(s) Oral daily  influenza   Vaccine 0.5 milliLiter(s) IntraMuscular once  methylPREDNISolone sodium succinate Injectable 20 milliGRAM(s) IV Push three times a day  tiotropium 18 MICROgram(s) Capsule 1 Capsule(s) Inhalation daily  triamcinolone 0.1% Ointment 1 Application(s) Topical every 12 hours    MEDICATIONS  (PRN):  LORazepam     Tablet 2 milliGRAM(s) Oral every 2 hours PRN Symptom-triggered 2 point increase in CIWA-Ar      Allergies    No Known Drug Allergies  shellfish (Angioedema)    Intolerances      vitals reviewed         PHYSICAL EXAM:  GENERAL: NAD  HEAD:  Atraumatic, Normocephalic  EYES: EOMI, PERRLA, conjunctiva and sclera clear  ENT: O/P Clear  NECK: Supple, No JVD  NERVOUS SYSTEM:  No focal deficits  CHEST/LUNG: Clear to percussion bilaterally; No rales, rhonchi, wheezing  HEART: Regular rate and rhythm; No murmurs, rubs, or gallops  ABDOMEN: Soft, Nontender, Nondistended; Bowel sounds present  EXTREMITIES:  2+ Peripheral Pulses, No clubbing, cyanosis, no B/L LE edema  SKIN: No rashes or lesions    LABS:  reviewed

## 2022-03-15 NOTE — PROGRESS NOTE ADULT - SUBJECTIVE AND OBJECTIVE BOX
INTERVAL HISTORY:  Findings noted    PAST MEDICAL & SURGICAL HISTORY:  HTN (hypertension)    Asthma          MEDICATIONS:  MEDICATIONS  (STANDING):  albuterol/ipratropium for Nebulization 3 milliLiter(s) Nebulizer every 6 hours  budesonide 160 MICROgram(s)/formoterol 4.5 MICROgram(s) Inhaler 2 Puff(s) Inhalation two times a day  cefTRIAXone   IVPB 1000 milliGRAM(s) IV Intermittent every 24 hours  heparin   Injectable 5000 Unit(s) SubCutaneous every 12 hours  hydrOXYzine hydrochloride 25 milliGRAM(s) Oral daily  influenza   Vaccine 0.5 milliLiter(s) IntraMuscular once  potassium chloride   Powder 40 milliEquivalent(s) Oral once  predniSONE   Tablet 20 milliGRAM(s) Oral daily  tiotropium 18 MICROgram(s) Capsule 1 Capsule(s) Inhalation daily  triamcinolone 0.1% Ointment 1 Application(s) Topical every 12 hours    MEDICATIONS  (PRN):  acetaminophen     Tablet .. 650 milliGRAM(s) Oral every 6 hours PRN Temp greater or equal to 38C (100.4F), Moderate Pain (4 - 6)  LORazepam     Tablet 2 milliGRAM(s) Oral every 2 hours PRN Symptom-triggered 2 point increase in CIWA-Ar      PHYSICAL EXAM:  T(F): 98.6 (03-15-22 @ 12:53), Max: 98.6 (03-15-22 @ 12:53)  HR: 94 (03-15-22 @ 12:53) (88 - 100)  BP: 112/72 (03-15-22 @ 12:53) (112/72 - 133/88)  RR: 18 (03-15-22 @ 12:53) (17 - 19)  SpO2: 100% (03-15-22 @ 12:53) (95% - 100%)  Wt(kg): --  I&O's Summary    14 Mar 2022 07:01  -  15 Mar 2022 07:00  --------------------------------------------------------  IN: 780 mL / OUT: 3500 mL / NET: -2720 mL                                             12.8   7.46  )-----------( 201      ( 15 Mar 2022 07:13 )             37.6     03-15    136  |  102  |  22  ----------------------------<  161<H>  3.4<L>   |  26  |  0.88    Ca    9.3      15 Mar 2022 07:13    TPro  7.2  /  Alb  2.8<L>  /  TBili  0.7  /  DBili  x   /  AST  33  /  ALT  62  /  AlkPhos  111  03-15      proBNP: Serum Pro-Brain Natriuretic Peptide: 990 pg/mL (03-15 @ 07:13)    Lipid Profile:   HgA1c:   TSH:   Test                            Date  WBC         7.46                03-15 @ 07:13  RBC         3.87                03-15 @ 07:13  Hemoglobin  12.8                03-15 @ 07:13  Hematocrit  37.6                03-15 @ 07:13  Platelets   201                 03-15 @ 07:13  Creatinine  0.88                03-15 @ 07:13  Test                            Date  WBC         9.84                03-14 @ 06:43  RBC         3.70                03-14 @ 06:43  Hemoglobin  12.3                03-14 @ 06:43  Hematocrit  36.8                03-14 @ 06:43  Platelets   172                 03-14 @ 06:43  Creatinine  1.07                03-14 @ 06:43         BNP lower  Trop negative

## 2022-03-15 NOTE — PROGRESS NOTE ADULT - ASSESSMENT
Called [facility] [employer]  record notes 3 negative coronary arteriograms  dx of alcoholic cardiomyopathy  EF in 40% range 12/21    no acute cardiac intervention needed at present  prognosis will be more clearly related to cessation of alcohol use  additional cardiac meds to be considered once pulmonary status improved, as outpatient  additional rx/GDMT not appropriate at present due to other medical issues  repeat CXR pending

## 2022-03-16 PROCEDURE — 99232 SBSQ HOSP IP/OBS MODERATE 35: CPT

## 2022-03-16 RX ORDER — POTASSIUM CHLORIDE 20 MEQ
40 PACKET (EA) ORAL ONCE
Refills: 0 | Status: COMPLETED | OUTPATIENT
Start: 2022-03-16 | End: 2022-03-16

## 2022-03-16 RX ORDER — ERYTHROMYCIN BASE 5 MG/GRAM
1 OINTMENT (GRAM) OPHTHALMIC (EYE) AT BEDTIME
Refills: 0 | Status: DISCONTINUED | OUTPATIENT
Start: 2022-03-16 | End: 2022-03-18

## 2022-03-16 RX ADMIN — Medication 20 MILLIGRAM(S): at 05:04

## 2022-03-16 RX ADMIN — HEPARIN SODIUM 5000 UNIT(S): 5000 INJECTION INTRAVENOUS; SUBCUTANEOUS at 05:04

## 2022-03-16 RX ADMIN — Medication 3 MILLILITER(S): at 05:30

## 2022-03-16 RX ADMIN — Medication 3 MILLILITER(S): at 17:12

## 2022-03-16 RX ADMIN — Medication 1 APPLICATION(S): at 17:57

## 2022-03-16 RX ADMIN — BUDESONIDE AND FORMOTEROL FUMARATE DIHYDRATE 2 PUFF(S): 160; 4.5 AEROSOL RESPIRATORY (INHALATION) at 18:03

## 2022-03-16 RX ADMIN — Medication 25 MILLIGRAM(S): at 11:33

## 2022-03-16 RX ADMIN — Medication 3 MILLILITER(S): at 11:55

## 2022-03-16 RX ADMIN — Medication 1 APPLICATION(S): at 05:05

## 2022-03-16 RX ADMIN — Medication 40 MILLIEQUIVALENT(S): at 17:57

## 2022-03-16 RX ADMIN — Medication 3 MILLILITER(S): at 23:38

## 2022-03-16 RX ADMIN — Medication 1 APPLICATION(S): at 21:03

## 2022-03-16 RX ADMIN — BUDESONIDE AND FORMOTEROL FUMARATE DIHYDRATE 2 PUFF(S): 160; 4.5 AEROSOL RESPIRATORY (INHALATION) at 05:06

## 2022-03-16 RX ADMIN — Medication 3 MILLILITER(S): at 00:20

## 2022-03-16 RX ADMIN — CEFTRIAXONE 100 MILLIGRAM(S): 500 INJECTION, POWDER, FOR SOLUTION INTRAMUSCULAR; INTRAVENOUS at 14:12

## 2022-03-16 RX ADMIN — HEPARIN SODIUM 5000 UNIT(S): 5000 INJECTION INTRAVENOUS; SUBCUTANEOUS at 17:56

## 2022-03-16 RX ADMIN — TIOTROPIUM BROMIDE 1 CAPSULE(S): 18 CAPSULE ORAL; RESPIRATORY (INHALATION) at 11:38

## 2022-03-16 NOTE — PROGRESS NOTE ADULT - ASSESSMENT
ROBBIE REYNOSO 41 F 3/12/2022 1980 DR DARRON NELSON    REVIEW OF SYMPTOMS      Able to give ROS  Yes     RELIABLE +/-   CONSTITUTIONAL Weakness Yes  Chills No   ENDOCRINE  No heat or cold intolerance    ALLERGY No hives  Sore throat No stridor  RESP Coughing blood no  Shortness of breath YES   NEURO No Headache  Confusion Pain neck No   CARDIAC No Chest pain No Palpitations   GI  Pain abdomen NO   Vomiting NO     PHYSICAL EXAM    HEENT Unremarkable  atraumatic   RESP Fair air entry EXP prolonged    Harsh breath sound Resp distres mild   CARDIAC S1 S2 No S3     NO JVD    ABDOMEN SOFT BS PRESENT NOT DISTENDED No hepatosplenomegaly PEDAL EDEMA present No calf tenderness  NO rash       DOA/CC/PROBLEMS poa .  3/12/2022   41 m  asthma ex    PMH-PSH .  pmh asthma  pmh     PROBLEMS .  Asthma exacerbation  AOC HFREF ef 45%    HO ETOH ABUSE Cmpthy   Pneumonia       COVID/ICU/CODE STATUS.                       COVID  STATUS.   3/12/2022 scv2 (-)         ICU STAY. none  GOC.               3/12/2022 full code    BEST PRACTICE ISSUES.                                                  HEAD OF BED ELEVATION. Yes  DVT PROPHYLAXIS.    3/12/2022 hpsc   BRAVO PROPHYLAXIS.                                                                                        DIET.   3/12/2022 reglr    VITALS/PO/IO/VENT/DRIPS.    3/16/2022 afeb 86 120/70       PROBLEM DATA/PLAN.    HEMODYNAMICS.   Monitor bp Target MAP 65 (+)    RESP.   Monitor po Target po 90-95%      PMH/PSH PROBLEMS.  Management continued/modified as indicated    OXYGEN REQUIREMENTS.  3/16/2022 2l 96%    INFECTION.  W 3/12-3/13-3/15/2022 w 8.2 - 7.6- 7.4   pr 3/14/2022 pr .3    rvp 3/12 (-)   cxr 3/12/2022 hazy airspcae dis rll  FLU ab 3/12/2022 (-)   legn 3/14 (-)   ct ch 3/13/2022 pneumonia chf  needs followup ct ch  abio 3/12-3/17/2022 rocephin   3/13-3/15/2022 azithro dced   3/12 rocephin     RESP FAILURE  3/12/2022 6a abg 40% 747/32/197   ar  hypoxemic resp failure  o2 suppl      ASTHMA ex.  HO intubation for asthma   ho childhood asthma   ho evcazema   3/14/2022 symbicort 160  3/14/2022 pred 20  312 spiriva   Cont rx   given above hx he represents high risk pt so would continue asthgma rx     RO VTE  3/13/2022 v duplx (-)    CHF  ho etoh abuse   ho (-) angiogram x 2   ct ch 3/13/2022 pneumonia chf  needs followup ct ch  bnp 3/12-3/15/2022 bnp 3696 - 990  3/12/2022  echoef 45% elevated lap dd2 mod enlarged la   3/15/2022 lasix 20   3/13-3/14/2022 kasix 40.2       THROMBOCYTOPENIA   plt 3/12/2022 plt 125   hit 3/13/2022 (-)   monitor    TIME SPENT   Over 25 minutes aggregate care time spent on encounter; activities included   direct patient care, counseling and/or coordinating care reviewing notes, lab data/ imaging , discussion with multidisciplinary team/ patient  /family and explaining in detail risks, benefits, alternatives  of the recommendations     ROBBIE REYNOSO 41 F 3/12/2022 1980 DR DARRON NELSON

## 2022-03-16 NOTE — PROGRESS NOTE ADULT - ASSESSMENT
41M   hx of  childhood  br  asthma w/ no previous intubations in the past for asthma, hospitalized in the past for asthma exacerbation/  h/o child horowitz  eczema  smokes  2  cigs/ day and also indulges  in etoh/  every  5  to 7  days pe r  pt, if  he  does  not, then per  pt , he gets seizures  pt is unemployed  and lives  with hie mother     p/w via EMS for asthma exacerbation today   per  ED  note, pt - was  given 12 mg Decadron, duonebs x 2, epipen - / s/p tachypnea  w/ bilateral   wheezing,  was  on bipap in the ED,   denies sick contacts./ no fevers/chills/ cp/ abd pain  covid negative    Hypoxia  MOST LIKELY DUE TO Alcoholic Cardiomyopathy   ECHO with EF similar to previous ECHO in dec/21  s/p multiple caths at [facility]-negative  Also component of asthma, questionable Pna  -will finish last day of Rocephin tomorrow  -try to wean off oxygen -improving   -alcohol and drug education/ cessation           *  h/o c/c  eczema    on steroid   cream/ atarax      *  h/o etoh  indulgence/ smoking/ cocaine abuse (rarely a per pt)- last drink   was   about    a week  ago, per pt   on [facility]  protocol  / symptom  triggered ,  as  pt  . sometimes he gets  seizures  -no withdrawal symptoms seen     on dvt ppx/  s/q  heparin      41M   hx of  childhood  br  asthma w/ no previous intubations in the past for asthma, hospitalized in the past for asthma exacerbation/  h/o child horowitz  eczema  smokes  2  cigs/ day and also indulges  in etoh/  every  5  to 7  days pe r  pt, if  he  does  not, then per  pt , he gets seizures  pt is unemployed  and lives  with hie mother     p/w via EMS for asthma exacerbation today   per  ED  note, pt - was  given 12 mg Decadron, duonebs x 2, epipen - / s/p tachypnea  w/ bilateral   wheezing,  was  on bipap in the ED,   denies sick contacts./ no fevers/chills/ cp/ abd pain  covid negative    Hypoxia  MOST LIKELY DUE TO Alcoholic Cardiomyopathy   ECHO with EF similar to previous ECHO in dec/21  s/p multiple caths at [facility]-negative  Also component of asthma, questionable Pna  -will finish last day of Rocephin tomorrow  -try to wean off oxygen -improving   -alcohol and drug education/ cessation           *  h/o c/c  eczema    on steroid   cream/ atarax      *  h/o etoh  indulgence/ smoking/ cocaine abuse (rarely a per pt)- last drink   was   about    a week  ago, per pt   on [facility]wa  protocol  / symptom  triggered ,  as  pt  . sometimes he gets  seizures  -no withdrawal symptoms seen     Right Eye conjunctivitis   -will prescribe eye drops     on dvt ppx/  s/q  heparin      41M   hx of  childhood  br  asthma w/ no previous intubations in the past for asthma, hospitalized in the past for asthma exacerbation/  h/o child horowitz  eczema  smokes  2  cigs/ day and also indulges  in etoh/  every  5  to 7  days pe r  pt, if  he  does  not, then per  pt , he gets seizures  pt is unemployed  and lives  with hie mother     p/w via EMS for asthma exacerbation today   per  ED  note, pt - was  given 12 mg Decadron, duonebs x 2, epipen - / s/p tachypnea  w/ bilateral   wheezing,  was  on bipap in the ED,   denies sick contacts./ no fevers/chills/ cp/ abd pain  covid negative    Hypoxia  MOST LIKELY DUE TO Alcoholic Cardiomyopathy   ECHO with EF similar to previous ECHO in dec/21  s/p multiple caths at [facility]-negative  Also component of asthma, questionable Pna  -will finish last day of Rocephin tomorrow, continue low dose lasix and low dose prednisone, plus inhalers   -try to wean off oxygen -improving   -alcohol and drug education/ cessation           *  h/o c/c  eczema    on steroid   cream/ atarax      *  h/o etoh  indulgence/ smoking/ cocaine abuse (rarely a per pt)- last drink   was   about    a week  ago, per pt   on [facility]  protocol  / symptom  triggered ,  as  pt  . sometimes he gets  seizures  -no withdrawal symptoms seen     Right Eye conjunctivitis   -will prescribe eye drops     on dvt ppx/  s/q  heparin

## 2022-03-16 NOTE — PROGRESS NOTE ADULT - SUBJECTIVE AND OBJECTIVE BOX
Patient is a 41y old  Male who presents with a chief complaint of sob (13 Mar 2022 11:50)       INTERVAL HPI/OVERNIGHT EVENTS: Pt improving, will try to wean off oxygen today       REVIEW OF SYSTEMS:   Remaining ROS negative    Home Medications:        MEDICATIONS  (STANDING):  albuterol/ipratropium for Nebulization 3 milliLiter(s) Nebulizer every 6 hours  azithromycin   Tablet 500 milliGRAM(s) Oral daily  budesonide 160 MICROgram(s)/formoterol 4.5 MICROgram(s) Inhaler 2 Puff(s) Inhalation two times a day  cefTRIAXone   IVPB 1000 milliGRAM(s) IV Intermittent every 24 hours  furosemide   Injectable 40 milliGRAM(s) IV Push two times a day  heparin   Injectable 5000 Unit(s) SubCutaneous every 12 hours  hydrOXYzine hydrochloride 25 milliGRAM(s) Oral daily  influenza   Vaccine 0.5 milliLiter(s) IntraMuscular once  methylPREDNISolone sodium succinate Injectable 20 milliGRAM(s) IV Push three times a day  tiotropium 18 MICROgram(s) Capsule 1 Capsule(s) Inhalation daily  triamcinolone 0.1% Ointment 1 Application(s) Topical every 12 hours    MEDICATIONS  (PRN):  LORazepam     Tablet 2 milliGRAM(s) Oral every 2 hours PRN Symptom-triggered 2 point increase in CIWA-Ar      Allergies    No Known Drug Allergies  shellfish (Angioedema)    Intolerances      vitals reviewed         PHYSICAL EXAM:  GENERAL: NAD  HEAD:  Atraumatic, Normocephalic  EYES: EOMI, PERRLA, conjunctiva and sclera clear  ENT: O/P Clear  NECK: Supple, No JVD  NERVOUS SYSTEM:  No focal deficits  CHEST/LUNG: Clear to percussion bilaterally; No rales, rhonchi, wheezing  HEART: Regular rate and rhythm; No murmurs, rubs, or gallops  ABDOMEN: Soft, Nontender, Nondistended; Bowel sounds present  EXTREMITIES:  2+ Peripheral Pulses, No clubbing, cyanosis, no B/L LE edema  SKIN: No rashes or lesions    LABS:  reviewed          Patient is a 41y old  Male who presents with a chief complaint of sob (13 Mar 2022 11:50)       INTERVAL HPI/OVERNIGHT EVENTS: Pt improving, will try to wean off oxygen today   Complaining of crusting in right eye      REVIEW OF SYSTEMS:   Remaining ROS negative    Home Medications:        MEDICATIONS  (STANDING):  albuterol/ipratropium for Nebulization 3 milliLiter(s) Nebulizer every 6 hours  azithromycin   Tablet 500 milliGRAM(s) Oral daily  budesonide 160 MICROgram(s)/formoterol 4.5 MICROgram(s) Inhaler 2 Puff(s) Inhalation two times a day  cefTRIAXone   IVPB 1000 milliGRAM(s) IV Intermittent every 24 hours  furosemide   Injectable 40 milliGRAM(s) IV Push two times a day  heparin   Injectable 5000 Unit(s) SubCutaneous every 12 hours  hydrOXYzine hydrochloride 25 milliGRAM(s) Oral daily  influenza   Vaccine 0.5 milliLiter(s) IntraMuscular once  methylPREDNISolone sodium succinate Injectable 20 milliGRAM(s) IV Push three times a day  tiotropium 18 MICROgram(s) Capsule 1 Capsule(s) Inhalation daily  triamcinolone 0.1% Ointment 1 Application(s) Topical every 12 hours    MEDICATIONS  (PRN):  LORazepam     Tablet 2 milliGRAM(s) Oral every 2 hours PRN Symptom-triggered 2 point increase in CIWA-Ar      Allergies    No Known Drug Allergies  shellfish (Angioedema)    Intolerances      vitals reviewed         PHYSICAL EXAM:  GENERAL: NAD  HEAD:  Atraumatic, Normocephalic  EYES: EOMI, PERRLA, right eye-mildly erythematous, no pus appreciated  ENT: O/P Clear  NECK: Supple, No JVD  NERVOUS SYSTEM:  No focal deficits  CHEST/LUNG: Clear to percussion bilaterally; No rales, rhonchi, wheezing  HEART: Regular rate and rhythm; No murmurs, rubs, or gallops  ABDOMEN: Soft, Nontender, Nondistended; Bowel sounds present  EXTREMITIES:  2+ Peripheral Pulses, No clubbing, cyanosis, no B/L LE edema  SKIN: No rashes or lesions    LABS:  reviewed          Patient is a 41y old  Male who presents with a chief complaint of sob (13 Mar 2022 11:50)       INTERVAL HPI/OVERNIGHT EVENTS: Pt improving, will try to wean off oxygen today , states that had small episode of sob when getting out of bed- but used nebulizer right away which helped   Complaining of crusting in right eye      REVIEW OF SYSTEMS:   Remaining ROS negative    Home Medications:        MEDICATIONS  (STANDING):  albuterol/ipratropium for Nebulization 3 milliLiter(s) Nebulizer every 6 hours  azithromycin   Tablet 500 milliGRAM(s) Oral daily  budesonide 160 MICROgram(s)/formoterol 4.5 MICROgram(s) Inhaler 2 Puff(s) Inhalation two times a day  cefTRIAXone   IVPB 1000 milliGRAM(s) IV Intermittent every 24 hours  furosemide   Injectable 40 milliGRAM(s) IV Push two times a day  heparin   Injectable 5000 Unit(s) SubCutaneous every 12 hours  hydrOXYzine hydrochloride 25 milliGRAM(s) Oral daily  influenza   Vaccine 0.5 milliLiter(s) IntraMuscular once  methylPREDNISolone sodium succinate Injectable 20 milliGRAM(s) IV Push three times a day  tiotropium 18 MICROgram(s) Capsule 1 Capsule(s) Inhalation daily  triamcinolone 0.1% Ointment 1 Application(s) Topical every 12 hours    MEDICATIONS  (PRN):  LORazepam     Tablet 2 milliGRAM(s) Oral every 2 hours PRN Symptom-triggered 2 point increase in CIWA-Ar      Allergies    No Known Drug Allergies  shellfish (Angioedema)    Intolerances      vitals reviewed         PHYSICAL EXAM:  GENERAL: NAD  HEAD:  Atraumatic, Normocephalic  EYES: EOMI, PERRLA, right eye-mildly erythematous, no pus appreciated  ENT: O/P Clear  NECK: Supple, No JVD  NERVOUS SYSTEM:  No focal deficits  CHEST/LUNG: Clear to percussion bilaterally; No rales, rhonchi, wheezing  HEART: Regular rate and rhythm; No murmurs, rubs, or gallops  ABDOMEN: Soft, Nontender, Nondistended; Bowel sounds present  EXTREMITIES:  2+ Peripheral Pulses, No clubbing, cyanosis, no B/L LE edema  SKIN: No rashes or lesions    LABS:  reviewed

## 2022-03-16 NOTE — PROGRESS NOTE ADULT - SUBJECTIVE AND OBJECTIVE BOX
ANGELES AVALOS    [facility] 2[facility] [address] I    Allergies    No Known Drug Allergies  shellfish (Angioedema)    Intolerances        PAST MEDICAL & SURGICAL HISTORY:  HTN (hypertension)    Asthma        FAMILY HISTORY:      Home Medications:      MEDICATIONS  (STANDING):  albuterol/ipratropium for Nebulization 3 milliLiter(s) Nebulizer every 6 hours  budesonide 160 MICROgram(s)/formoterol 4.5 MICROgram(s) Inhaler 2 Puff(s) Inhalation two times a day  cefTRIAXone   IVPB 1000 milliGRAM(s) IV Intermittent every 24 hours  furosemide    Tablet 20 milliGRAM(s) Oral daily  heparin   Injectable 5000 Unit(s) SubCutaneous every 12 hours  hydrOXYzine hydrochloride 25 milliGRAM(s) Oral daily  influenza   Vaccine 0.5 milliLiter(s) IntraMuscular once  predniSONE   Tablet 20 milliGRAM(s) Oral daily  tiotropium 18 MICROgram(s) Capsule 1 Capsule(s) Inhalation daily  triamcinolone 0.1% Ointment 1 Application(s) Topical every 12 hours    MEDICATIONS  (PRN):  acetaminophen     Tablet .. 650 milliGRAM(s) Oral every 6 hours PRN Temp greater or equal to 38C (100.4F), Moderate Pain (4 - 6)  LORazepam     Tablet 2 milliGRAM(s) Oral every 2 hours PRN Symptom-triggered 2 point increase in CIWA-Ar      Diet, Regular (03-12-22 @ 08:33) [Active]          Vital Signs Last 24 Hrs  T(C): 37.1 (16 Mar 2022 04:48), Max: 37.1 (16 Mar 2022 04:48)  T(F): 98.7 (16 Mar 2022 04:48), Max: 98.7 (16 Mar 2022 04:48)  HR: 101 (16 Mar 2022 05:31) (88 - 104)  BP: 131/86 (16 Mar 2022 04:48) (109/73 - 142/87)  BP(mean): 101 (16 Mar 2022 04:48) (101 - 101)  RR: 19 (16 Mar 2022 04:48) (18 - 19)  SpO2: 98% (16 Mar 2022 05:31) (95% - 100%)      03-15-22 @ 07:01  -  03-16-22 @ 07:00  --------------------------------------------------------  IN: 650 mL / OUT: 300 mL / NET: 350 mL              LABS:                        12.8   7.46  )-----------( 201      ( 15 Mar 2022 07:13 )             37.6     03-15    136  |  102  |  22  ----------------------------<  161<H>  3.4<L>   |  26  |  0.88    Ca    9.3      15 Mar 2022 07:13    TPro  7.2  /  Alb  2.8<L>  /  TBili  0.7  /  DBili  x   /  AST  33  /  ALT  62  /  AlkPhos  111  03-15              WBC:  WBC Count: 7.46 K/uL (03-15 @ 07:13)  WBC Count: 9.84 K/uL (03-14 @ 06:43)  WBC Count: 7.60 K/uL (03-13 @ 07:40)      MICROBIOLOGY:  RECENT CULTURES:        CARDIAC MARKERS ( 14 Mar 2022 17:54 )  x     / x     / 51 U/L / x     / x                Sodium:  Sodium, Serum: 136 mmol/L (03-15 @ 07:13)  Sodium, Serum: 138 mmol/L (03-14 @ 06:43)  Sodium, Serum: 137 mmol/L (03-13 @ 07:40)  Sodium, Serum: 138 mmol/L (03-12 @ 18:26)      0.88 mg/dL 03-15 @ 07:13  1.07 mg/dL 03-14 @ 06:43  1.05 mg/dL 03-13 @ 07:40  1.22 mg/dL 03-12 @ 18:26      Hemoglobin:  Hemoglobin: 12.8 g/dL (03-15 @ 07:13)  Hemoglobin: 12.3 g/dL (03-14 @ 06:43)  Hemoglobin: 11.3 g/dL (03-13 @ 07:40)      Platelets: Platelet Count - Automated: 201 K/uL (03-15 @ 07:13)  Platelet Count - Automated: 172 K/uL (03-14 @ 06:43)  Platelet Count - Automated: 123 K/uL (03-13 @ 07:40)      LIVER FUNCTIONS - ( 15 Mar 2022 07:13 )  Alb: 2.8 g/dL / Pro: 7.2 gm/dL / ALK PHOS: 111 U/L / ALT: 62 U/L / AST: 33 U/L / GGT: x                 RADIOLOGY & ADDITIONAL STUDIES:      MICROBIOLOGY:  RECENT CULTURES:

## 2022-03-16 NOTE — PROGRESS NOTE ADULT - SUBJECTIVE AND OBJECTIVE BOX
INTERVAL HISTORY:  feels better  not walking much  no cp    PAST MEDICAL & SURGICAL HISTORY:  HTN (hypertension)    Asthma          MEDICATIONS:  MEDICATIONS  (STANDING):  albuterol/ipratropium for Nebulization 3 milliLiter(s) Nebulizer every 6 hours  budesonide 160 MICROgram(s)/formoterol 4.5 MICROgram(s) Inhaler 2 Puff(s) Inhalation two times a day  cefTRIAXone   IVPB 1000 milliGRAM(s) IV Intermittent every 24 hours  furosemide    Tablet 20 milliGRAM(s) Oral daily  heparin   Injectable 5000 Unit(s) SubCutaneous every 12 hours  hydrOXYzine hydrochloride 25 milliGRAM(s) Oral daily  influenza   Vaccine 0.5 milliLiter(s) IntraMuscular once  predniSONE   Tablet 20 milliGRAM(s) Oral daily  tiotropium 18 MICROgram(s) Capsule 1 Capsule(s) Inhalation daily  triamcinolone 0.1% Ointment 1 Application(s) Topical every 12 hours    MEDICATIONS  (PRN):  acetaminophen     Tablet .. 650 milliGRAM(s) Oral every 6 hours PRN Temp greater or equal to 38C (100.4F), Moderate Pain (4 - 6)  LORazepam     Tablet 2 milliGRAM(s) Oral every 2 hours PRN Symptom-triggered 2 point increase in CIWA-Ar      PHYSICAL EXAM:  T(F): 98.1 (03-16-22 @ 10:56), Max: 98.7 (03-16-22 @ 04:48)  HR: 82 (03-16-22 @ 10:56) (81 - 104)  BP: 125/75 (03-16-22 @ 10:56) (109/73 - 142/87)  RR: 18 (03-16-22 @ 10:56) (18 - 19)  SpO2: 96% (03-16-22 @ 10:56) (95% - 100%)  Wt(kg): --  I&O's Summary    15 Mar 2022 07:01  -  16 Mar 2022 07:00  --------------------------------------------------------  IN: 650 mL / OUT: 300 mL / NET: 350 mL          PHYSICAL EXAM:    HEENT: sclera anicteric, conjunctiva normal  JVP normal  Carotids: normal upstroke  Lungs:  clear  Cor: normal S1S2, no S3, rub  Abdomen:  normal bs, nontender, nondistended, no organomegaly  Ext:  no edema          OTHER: 	    LABS:	     CARDIAC MARKERS:                                  12.8   7.46  )-----------( 201      ( 15 Mar 2022 07:13 )             37.6     03-15    136  |  102  |  22  ----------------------------<  161<H>  3.4<L>   |  26  |  0.88    Ca    9.3      15 Mar 2022 07:13    TPro  7.2  /  Alb  2.8<L>  /  TBili  0.7  /  DBili  x   /  AST  33  /  ALT  62  /  AlkPhos  111  03-15      proBNP: Serum Pro-Brain Natriuretic Peptide: 990 pg/mL (03-15 @ 07:13)    Lipid Profile:   HgA1c:   TSH:   Test                            Date  WBC         7.46                03-15 @ 07:13  RBC         3.87                03-15 @ 07:13  Hemoglobin  12.8                03-15 @ 07:13  Hematocrit  37.6                03-15 @ 07:13  Platelets   201                 03-15 @ 07:13  Creatinine  0.88                03-15 @ 07:13

## 2022-03-16 NOTE — PROGRESS NOTE ADULT - ASSESSMENT
CXR read as clear, looks slightly congested to me;  BNP down but still 990, started furosemide 20 mg/d  will need K supplementation    he seemed surprised to hear that he had a cardiac issue, even though he has had 3 cardiac catheterizations and   several hospitalizations with cardiology consultations    issues reviewed with him w.r.t. alcohol cessation as the only plausible route to improve his cardiomyopathy  outpatient followup with his regular physicians  will follow as needed

## 2022-03-17 LAB
ANION GAP SERPL CALC-SCNC: 7 MMOL/L — SIGNIFICANT CHANGE UP (ref 5–17)
BUN SERPL-MCNC: 23 MG/DL — SIGNIFICANT CHANGE UP (ref 7–23)
CALCIUM SERPL-MCNC: 9.2 MG/DL — SIGNIFICANT CHANGE UP (ref 8.5–10.1)
CHLORIDE SERPL-SCNC: 101 MMOL/L — SIGNIFICANT CHANGE UP (ref 96–108)
CO2 SERPL-SCNC: 26 MMOL/L — SIGNIFICANT CHANGE UP (ref 22–31)
CREAT SERPL-MCNC: 1.04 MG/DL — SIGNIFICANT CHANGE UP (ref 0.5–1.3)
EGFR: 94 ML/MIN/1.73M2 — SIGNIFICANT CHANGE UP
GLUCOSE SERPL-MCNC: 217 MG/DL — HIGH (ref 70–99)
MAGNESIUM SERPL-MCNC: 1.8 MG/DL — SIGNIFICANT CHANGE UP (ref 1.6–2.6)
POTASSIUM SERPL-MCNC: 4.2 MMOL/L — SIGNIFICANT CHANGE UP (ref 3.5–5.3)
POTASSIUM SERPL-SCNC: 4.2 MMOL/L — SIGNIFICANT CHANGE UP (ref 3.5–5.3)
SODIUM SERPL-SCNC: 134 MMOL/L — LOW (ref 135–145)

## 2022-03-17 PROCEDURE — 99232 SBSQ HOSP IP/OBS MODERATE 35: CPT

## 2022-03-17 RX ADMIN — HEPARIN SODIUM 5000 UNIT(S): 5000 INJECTION INTRAVENOUS; SUBCUTANEOUS at 17:59

## 2022-03-17 RX ADMIN — Medication 25 MILLIGRAM(S): at 13:16

## 2022-03-17 RX ADMIN — Medication 20 MILLIGRAM(S): at 05:49

## 2022-03-17 RX ADMIN — Medication 1 APPLICATION(S): at 05:50

## 2022-03-17 RX ADMIN — BUDESONIDE AND FORMOTEROL FUMARATE DIHYDRATE 2 PUFF(S): 160; 4.5 AEROSOL RESPIRATORY (INHALATION) at 05:50

## 2022-03-17 RX ADMIN — TIOTROPIUM BROMIDE 1 CAPSULE(S): 18 CAPSULE ORAL; RESPIRATORY (INHALATION) at 13:16

## 2022-03-17 RX ADMIN — Medication 1 APPLICATION(S): at 21:23

## 2022-03-17 RX ADMIN — HEPARIN SODIUM 5000 UNIT(S): 5000 INJECTION INTRAVENOUS; SUBCUTANEOUS at 05:50

## 2022-03-17 RX ADMIN — Medication 3 MILLILITER(S): at 17:15

## 2022-03-17 RX ADMIN — BUDESONIDE AND FORMOTEROL FUMARATE DIHYDRATE 2 PUFF(S): 160; 4.5 AEROSOL RESPIRATORY (INHALATION) at 17:15

## 2022-03-17 RX ADMIN — Medication 3 MILLILITER(S): at 05:13

## 2022-03-17 RX ADMIN — Medication 1 APPLICATION(S): at 17:58

## 2022-03-17 RX ADMIN — Medication 3 MILLILITER(S): at 11:00

## 2022-03-17 NOTE — PROGRESS NOTE ADULT - ASSESSMENT
41M   hx of  childhood  br  asthma w/ no previous intubations in the past for asthma, hospitalized in the past for asthma exacerbation/  h/o child horowitz  eczema  smokes  2  cigs/ day and also indulges  in etoh/  every  5  to 7  days pe r  pt, if  he  does  not, then per  pt , he gets seizures  pt is unemployed  and lives  with hie mother     p/w via EMS for asthma exacerbation today   per  ED  note, pt - was  given 12 mg Decadron, duonebs x 2, epipen - / s/p tachypnea  w/ bilateral   wheezing,  was  on bipap in the ED,   denies sick contacts./ no fevers/chills/ cp/ abd pain  covid negative    Hypoxia  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 sometimes he gets  seizures  -no withdrawal symptoms seen     Right Eye conjunctivitis   -will prescribe eye drops     on dvt ppx/  s/q  heparin      41M   hx of  childhood  br  asthma w/ no previous intubations in the past for asthma, hospitalized in the past for asthma exacerbation/  h/o child horowitz  eczema  smokes  2  cigs/ day and also indulges  in etoh/  every  5  to 7  days pe r  pt, if  he  does  not, then per  pt , he gets seizures  pt is unemployed  and lives  with hie mother     p/w via EMS for asthma exacerbation today   per  ED  note, pt - was  given 12 mg Decadron, duonebs x 2, epipen - / s/p tachypnea  w/ bilateral   wheezing,  was  on bipap in the ED,   denies sick contacts./ no fevers/chills/ cp/ abd pain  covid negative    Hypoxia  MOST LIKELY DUE TO Alcoholic Cardiomyopathy   ECHO with EF similar to previous ECHO in dec/21  s/p [facility] caths at [facility]-negative  Also component of asthma, questionable Pna  -will finish last day of Rocephin today, continue low dose lasix and low dose prednisone, plus inhalers   -try to wean off oxygen -improving   -alcohol and drug education/ cessation           *  h/o c/c  eczema    on steroid   cream/ atarax      *  h/o etoh  indulgence/ smoking/ cocaine abuse (rarely a per pt)- last drink   was   about    a week  ago, per pt   on ciwa  protocol  / symptom  triggered ,  as  pt  . sometimes he gets  seizures  -no withdrawal symptoms seen     Right Eye conjunctivitis   -will prescribe eye drops     on dvt ppx/  s/q  heparin    Hopefully discharge tomorrow am       41M   hx of  childhood  br  asthma w/ no previous intubations in the past for asthma, hospitalized in the past for asthma exacerbation/  h/o child horowitz  eczema  smokes  2  cigs/ day and also indulges  in etoh/  every  5  to 7  days pe r  pt, if  he  does  not, then per  pt , he gets seizures  pt is unemployed  and lives  with hie mother     p/w via EMS for asthma exacerbation today   per  ED  note, pt - was  given 12 mg Decadron, duonebs x 2, epipen - / s/p tachypnea  w/ bilateral   wheezing,  was  on bipap in the ED,   denies sick contacts./ no fevers/chills/ cp/ abd pain  covid negative    Hypoxia-improving  MOST LIKELY DUE TO Alcoholic Cardiomyopathy   ECHO with EF similar to previous ECHO in dec/21  s/p multiple caths at [facility]-negative  Also component of asthma, questionable Pna  -will finish last day of Rocephin today, continue low dose lasix and low dose prednisone, plus inhalers   -try to wean off oxygen -improving   -alcohol and drug education/ cessation           *  h/o c/c  eczema    on steroid   cream/ atarax      *  h/o etoh  indulgence/ smoking/ cocaine abuse (rarely a per pt)- last drink   was   about    a week  ago, per pt   on ciwa  protocol  / symptom  triggered ,  as  pt  . sometimes he gets  seizures  -no withdrawal symptoms seen     Right Eye conjunctivitis   -will prescribe eye drops   -improved     on dvt ppx/  s/q  heparin    Hopefully discharge tomorrow am

## 2022-03-17 NOTE — PROGRESS NOTE ADULT - ASSESSMENT
ROBBIE REYNOSO 41 F 3/12/2022 1980 DR DARRON NELSON    REVIEW OF SYMPTOMS      Able to give ROS  Yes     RELIABLE +/-   CONSTITUTIONAL Weakness Yes  Chills No   ENDOCRINE  No heat or cold intolerance    ALLERGY No hives  Sore throat No stridor  RESP Coughing blood no  Shortness of breath YES   NEURO No Headache  Confusion Pain neck No   CARDIAC No Chest pain No Palpitations   GI  Pain abdomen NO   Vomiting NO     PHYSICAL EXAM    HEENT Unremarkable  atraumatic   RESP Fair air entry EXP prolonged    Harsh breath sound Resp distres mild   CARDIAC S1 S2 No S3     NO JVD    ABDOMEN SOFT BS PRESENT NOT DISTENDED No hepatosplenomegaly PEDAL EDEMA present No calf tenderness  NO rash       DOA/CC/PROBLEMS poa .  3/12/2022   41 m  asthma ex    PMH-PSH .  pmh asthma    PROBLEMS .  Asthma exacerbation  AOC HFREF ef 45%    HO ETOH ABUSE Cmpthy   Pneumonia     COVID/ICU/CODE STATUS.                       COVID  STATUS.   3/12/2022 scv2 (-)         ICU STAY. none  GOC.               3/12/2022 full code    BEST PRACTICE ISSUES.                                                  HEAD OF BED ELEVATION. Yes  DVT PROPHYLAXIS.    3/12/2022 hpsc   BRAVO PROPHYLAXIS.                                                                                        DIET.   3/12/2022 reglr             VITALS/PO/IO/VENT/DRIPS.    3/17/2022 afeb 83      PROBLEM DATA/PLAN.    HEMODYNAMICS.   Monitor bp Target MAP 65 (+)    RESP.   Monitor po Target po 90-95%      PMH/PSH PROBLEMS.  Management continued/modified as indicated    OXYGEN REQUIREMENTS.  3/17/2022 ra 99%     INFECTION.  W 3/12-3/13-3/15/2022 w 8.2 - 7.6- 7.4   pr 3/14/2022 pr .3    rvp 3/12 (-)   cxr 3/12/2022 hazy airspcae dis rll  FLU ab 3/12/2022 (-)   legn 3/14 (-)   ct ch 3/13/2022 pneumonia chf  needs followup ct ch  abio 3/12-3/17/2022 rocephin   3/13-3/15/2022 azithro dced   3/12 rocephin     RESP FAILURE  3/12/2022 6a abg 40% 747/32/197   ar  hypoxemic resp failure  o2 suppl      ASTHMA ex.  HO intubation for asthma   ho childhood asthma   ho evcazema   3/14/2022 symbicort 160  3/14/2022 pred 20  312 spiriva   Cont rx   given above hx he represents high risk pt so would continue asthgma rx       RO VTE  3/13/2022 v duplx (-)    CHF  ho etoh abuse   ho (-) angiogram x 2   ct ch 3/13/2022 pneumonia chf  needs followup ct ch  bnp 3/12-3/15/2022 bnp 3696 - 990  3/12/2022  echoef 45% elevated lap dd2 mod enlarged la   3/15/2022 lasix 20   3/13-3/14/2022 kasix 40.2       THROMBOCYTOPENIA   plt 3/12/2022 plt 125   hit 3/13/2022 (-)   monitor      TIME SPENT   Over 25 minutes aggregate care time spent on encounter; activities included   direct patient care, counseling and/or coordinating care reviewing notes, lab data/ imaging , discussion with multidisciplinary team/ patient  /family and explaining in detail risks, benefits, alternatives  of the recommendations     ROBBIE REYNOSO 41 F 3/12/2022 1980 DR DARRON NELSON

## 2022-03-17 NOTE — PROGRESS NOTE ADULT - SUBJECTIVE AND OBJECTIVE BOX
Patient is a 41y old  Male who presents with a chief complaint of sob (13 Mar 2022 11:50)       INTERVAL HPI/OVERNIGHT EVENTS: Pt improving, will try to wean off oxygen today , states that had small episode of sob when getting out of bed- but used nebulizer right away which helped   Complaining of crusting in right eye      REVIEW OF SYSTEMS:   Remaining ROS negative    Home Medications:        MEDICATIONS  (STANDING):  albuterol/ipratropium for Nebulization 3 milliLiter(s) Nebulizer every 6 hours  azithromycin   Tablet 500 milliGRAM(s) Oral daily  budesonide 160 MICROgram(s)/formoterol 4.5 MICROgram(s) Inhaler 2 Puff(s) Inhalation two times a day  cefTRIAXone   IVPB 1000 milliGRAM(s) IV Intermittent every 24 hours  furosemide   Injectable 40 milliGRAM(s) IV Push two times a day  heparin   Injectable 5000 Unit(s) SubCutaneous every 12 hours  hydrOXYzine hydrochloride 25 milliGRAM(s) Oral daily  influenza   Vaccine 0.5 milliLiter(s) IntraMuscular once  methylPREDNISolone sodium succinate Injectable 20 milliGRAM(s) IV Push three times a day  tiotropium 18 MICROgram(s) Capsule 1 Capsule(s) Inhalation daily  triamcinolone 0.1% Ointment 1 Application(s) Topical every 12 hours    MEDICATIONS  (PRN):  LORazepam     Tablet 2 milliGRAM(s) Oral every 2 hours PRN Symptom-triggered 2 point increase in CIWA-Ar      Allergies    No Known Drug Allergies  shellfish (Angioedema)    Intolerances      vitals reviewed         PHYSICAL EXAM:  GENERAL: NAD  HEAD:  Atraumatic, Normocephalic  EYES: EOMI, PERRLA, right eye-mildly erythematous, no pus appreciated  ENT: O/P Clear  NECK: Supple, No JVD  NERVOUS SYSTEM:  No focal deficits  CHEST/LUNG: Clear to percussion bilaterally; No rales, rhonchi, wheezing  HEART: Regular rate and rhythm; No murmurs, rubs, or gallops  ABDOMEN: Soft, Nontender, Nondistended; Bowel sounds present  EXTREMITIES:  2+ Peripheral Pulses, No clubbing, cyanosis, no B/L LE edema  SKIN: No rashes or lesions    LABS:  reviewed          Patient is a 41y old  Male who presents with a chief complaint of sob (13 Mar 2022 11:50)       INTERVAL HPI/OVERNIGHT EVENTS: Pt doing very well, getting wean off oxygen.       REVIEW OF SYSTEMS:   Remaining ROS negative    Home Medications:        MEDICATIONS  (STANDING):  albuterol/ipratropium for Nebulization 3 milliLiter(s) Nebulizer every 6 hours  azithromycin   Tablet 500 milliGRAM(s) Oral daily  budesonide 160 MICROgram(s)/formoterol 4.5 MICROgram(s) Inhaler 2 Puff(s) Inhalation two times a day  cefTRIAXone   IVPB 1000 milliGRAM(s) IV Intermittent every 24 hours  furosemide   Injectable 40 milliGRAM(s) IV Push two times a day  heparin   Injectable 5000 Unit(s) SubCutaneous every 12 hours  hydrOXYzine hydrochloride 25 milliGRAM(s) Oral daily  influenza   Vaccine 0.5 milliLiter(s) IntraMuscular once  methylPREDNISolone sodium succinate Injectable 20 milliGRAM(s) IV Push three times a day  tiotropium 18 MICROgram(s) Capsule 1 Capsule(s) Inhalation daily  triamcinolone 0.1% Ointment 1 Application(s) Topical every 12 hours    MEDICATIONS  (PRN):  LORazepam     Tablet 2 milliGRAM(s) Oral every 2 hours PRN Symptom-triggered 2 point increase in CIWA-Ar      Allergies    No Known Drug Allergies  shellfish (Angioedema)    Intolerances      vitals reviewed         PHYSICAL EXAM:  GENERAL: NAD  HEAD:  Atraumatic, Normocephalic  EYES: EOMI, PERRLA, right eye-mildly erythematous, no pus appreciated  ENT: O/P Clear  NECK: Supple, No JVD  NERVOUS SYSTEM:  No focal deficits  CHEST/LUNG: Clear to percussion bilaterally; No rales, rhonchi, wheezing  HEART: Regular rate and rhythm; No murmurs, rubs, or gallops  ABDOMEN: Soft, Nontender, Nondistended; Bowel sounds present  EXTREMITIES:  2+ Peripheral Pulses, No clubbing, cyanosis, no B/L LE edema  SKIN: No rashes or lesions    LABS:  reviewed          Patient is a 41y old  Male who presents with a chief complaint of sob (13 Mar 2022 11:50)       INTERVAL HPI/OVERNIGHT EVENTS: Pt doing very well, getting wean off oxygen.       REVIEW OF SYSTEMS:   Remaining ROS negative    Home Medications:        MEDICATIONS  (STANDING):  albuterol/ipratropium for Nebulization 3 milliLiter(s) Nebulizer every 6 hours  azithromycin   Tablet 500 milliGRAM(s) Oral daily  budesonide 160 MICROgram(s)/formoterol 4.5 MICROgram(s) Inhaler 2 Puff(s) Inhalation two times a day  cefTRIAXone   IVPB 1000 milliGRAM(s) IV Intermittent every 24 hours  furosemide   Injectable 40 milliGRAM(s) IV Push two times a day  heparin   Injectable 5000 Unit(s) SubCutaneous every 12 hours  hydrOXYzine hydrochloride 25 milliGRAM(s) Oral daily  influenza   Vaccine 0.5 milliLiter(s) IntraMuscular once  methylPREDNISolone sodium succinate Injectable 20 milliGRAM(s) IV Push three times a day  tiotropium 18 MICROgram(s) Capsule 1 Capsule(s) Inhalation daily  triamcinolone 0.1% Ointment 1 Application(s) Topical every 12 hours    MEDICATIONS  (PRN):  LORazepam     Tablet 2 milliGRAM(s) Oral every 2 hours PRN Symptom-triggered 2 point increase in CIWA-Ar      Allergies    No Known Drug Allergies  shellfish (Angioedema)    Intolerances      vitals reviewed         PHYSICAL EXAM:  GENERAL: NAD  HEAD:  Atraumatic, Normocephalic  EYES: EOMI, PERRLA, right eye-improved, no longer erythematous   ENT: O/P Clear  NECK: Supple, No JVD  NERVOUS SYSTEM:  No focal deficits  CHEST/LUNG: Clear to percussion bilaterally; No rales, rhonchi, wheezing  HEART: Regular rate and rhythm; No murmurs, rubs, or gallops  ABDOMEN: Soft, Nontender, Nondistended; Bowel sounds present  EXTREMITIES:  2+ Peripheral Pulses, No clubbing, cyanosis, no B/L LE edema  SKIN: No rashes or lesions    LABS:  reviewed

## 2022-03-17 NOTE — PROGRESS NOTE ADULT - SUBJECTIVE AND OBJECTIVE BOX
ANGELES AVALOS    LVS 2C 242 I    Allergies    No Known Drug Allergies  shellfish (Angioedema)    Intolerances        PAST MEDICAL & SURGICAL HISTORY:  HTN (hypertension)    Asthma        FAMILY HISTORY:      Home Medications:      MEDICATIONS  (STANDING):  albuterol/ipratropium for Nebulization 3 milliLiter(s) Nebulizer every 6 hours  budesonide 160 MICROgram(s)/formoterol 4.5 MICROgram(s) Inhaler 2 Puff(s) Inhalation two times a day  erythromycin   Ointment 1 Application(s) Both EYES at bedtime  furosemide    Tablet 20 milliGRAM(s) Oral daily  heparin   Injectable 5000 Unit(s) SubCutaneous every 12 hours  hydrOXYzine hydrochloride 25 milliGRAM(s) Oral daily  influenza   Vaccine 0.5 milliLiter(s) IntraMuscular once  predniSONE   Tablet 20 milliGRAM(s) Oral daily  tiotropium 18 MICROgram(s) Capsule 1 Capsule(s) Inhalation daily  triamcinolone 0.1% Ointment 1 Application(s) Topical every 12 hours    MEDICATIONS  (PRN):  acetaminophen     Tablet .. 650 milliGRAM(s) Oral every 6 hours PRN Temp greater or equal to 38C (100.4F), Moderate Pain (4 - 6)  LORazepam     Tablet 2 milliGRAM(s) Oral every 2 hours PRN Symptom-triggered 2 point increase in CIWA-Ar      Diet, Regular (03-12-22 @ 08:33) [Active]          Vital Signs Last 24 Hrs  T(C): 36.7 (17 Mar 2022 04:54), Max: 36.8 (16 Mar 2022 16:30)  T(F): 98.1 (17 Mar 2022 04:54), Max: 98.2 (16 Mar 2022 16:30)  HR: 80 (17 Mar 2022 07:43) (75 - 88)  BP: 129/81 (17 Mar 2022 04:54) (124/77 - 145/85)  BP(mean): 97 (17 Mar 2022 04:54) (97 - 105)  RR: 17 (17 Mar 2022 04:54) (17 - 18)  SpO2: 97% (17 Mar 2022 07:43) (96% - 100%)      03-16-22 @ 07:01  -  03-17-22 @ 07:00  --------------------------------------------------------  IN: 50 mL / OUT: 0 mL / NET: 50 mL    03-17-22 @ 07:01  -  03-17-22 @ 11:03  --------------------------------------------------------  IN: 360 mL / OUT: 0 mL / NET: 360 mL              LABS:    03-17    134<L>  |  101  |  23  ----------------------------<  217<H>  4.2   |  26  |  1.04    Ca    9.2      17 Mar 2022 06:37  Mg     1.8     03-17                WBC:  WBC Count: 7.46 K/uL (03-15 @ 07:13)  WBC Count: 9.84 K/uL (03-14 @ 06:43)      MICROBIOLOGY:  RECENT CULTURES:                  Sodium:  Sodium, Serum: 134 mmol/L (03-17 @ 06:37)  Sodium, Serum: 136 mmol/L (03-15 @ 07:13)  Sodium, Serum: 138 mmol/L (03-14 @ 06:43)      1.04 mg/dL 03-17 @ 06:37  0.88 mg/dL 03-15 @ 07:13  1.07 mg/dL 03-14 @ 06:43      Hemoglobin:  Hemoglobin: 12.8 g/dL (03-15 @ 07:13)  Hemoglobin: 12.3 g/dL (03-14 @ 06:43)      Platelets: Platelet Count - Automated: 201 K/uL (03-15 @ 07:13)  Platelet Count - Automated: 172 K/uL (03-14 @ 06:43)              RADIOLOGY & ADDITIONAL STUDIES:      MICROBIOLOGY:  RECENT CULTURES:

## 2022-03-18 ENCOUNTER — TRANSCRIPTION ENCOUNTER (OUTPATIENT)
Age: 42
End: 2022-03-18

## 2022-03-18 VITALS — HEART RATE: 75 BPM | OXYGEN SATURATION: 97 %

## 2022-03-18 PROCEDURE — 99239 HOSP IP/OBS DSCHRG MGMT >30: CPT

## 2022-03-18 PROCEDURE — 99232 SBSQ HOSP IP/OBS MODERATE 35: CPT

## 2022-03-18 RX ORDER — FUROSEMIDE 40 MG
1 TABLET ORAL
Qty: 30 | Refills: 0
Start: 2022-03-18 | End: 2022-04-16

## 2022-03-18 RX ORDER — BUDESONIDE AND FORMOTEROL FUMARATE DIHYDRATE 160; 4.5 UG/1; UG/1
2 AEROSOL RESPIRATORY (INHALATION)
Qty: 120 | Refills: 0
Start: 2022-03-18 | End: 2022-04-16

## 2022-03-18 RX ORDER — TIOTROPIUM BROMIDE 18 UG/1
2 CAPSULE ORAL; RESPIRATORY (INHALATION)
Qty: 60 | Refills: 0
Start: 2022-03-18 | End: 2022-04-16

## 2022-03-18 RX ADMIN — Medication 3 MILLILITER(S): at 00:29

## 2022-03-18 RX ADMIN — Medication 3 MILLILITER(S): at 11:19

## 2022-03-18 RX ADMIN — BUDESONIDE AND FORMOTEROL FUMARATE DIHYDRATE 2 PUFF(S): 160; 4.5 AEROSOL RESPIRATORY (INHALATION) at 17:33

## 2022-03-18 RX ADMIN — HEPARIN SODIUM 5000 UNIT(S): 5000 INJECTION INTRAVENOUS; SUBCUTANEOUS at 05:06

## 2022-03-18 RX ADMIN — BUDESONIDE AND FORMOTEROL FUMARATE DIHYDRATE 2 PUFF(S): 160; 4.5 AEROSOL RESPIRATORY (INHALATION) at 05:09

## 2022-03-18 RX ADMIN — Medication 3 MILLILITER(S): at 17:33

## 2022-03-18 RX ADMIN — TIOTROPIUM BROMIDE 1 CAPSULE(S): 18 CAPSULE ORAL; RESPIRATORY (INHALATION) at 11:43

## 2022-03-18 RX ADMIN — Medication 20 MILLIGRAM(S): at 05:06

## 2022-03-18 RX ADMIN — Medication 1 APPLICATION(S): at 05:10

## 2022-03-18 RX ADMIN — Medication 3 MILLILITER(S): at 05:16

## 2022-03-18 NOTE — DISCHARGE NOTE PROVIDER - HOSPITAL COURSE
41M hx of  childhood  br  asthma w/ no previous intubations in the past for asthma, hospitalized in the past for asthma exacerbation/  h/o child horowitz  eczema  smokes  2  cigs/ day and also indulges  in etoh/  every  5  to 7  days pe r  pt, if  he  does  not, then per  pt , he gets seizures  pt is unemployed  and lives  with hie mother     p/w via EMS for asthma exacerbation today   per  ED  note, pt - was  given 12 mg Decadron, duonebs x 2, epipen - / s/p tachypnea  w/ bilateral   wheezing,  was  on bipap in the ED,   denies sick contacts./ no fevers/chills/ cp/ abd pain  covid negative    Hypoxia-improving  MOST LIKELY DUE TO Alcoholic Cardiomyopathy   ECHO with EF similar to previous ECHO in dec/21  s/p [facility] caths at [facility]-negative  Also component of asthma, questionable Pna  -will finish last day of Rocephin today, continue low dose lasix and low dose prednisone, plus inhalers   -try to wean off oxygen -improving   -alcohol and drug education/ cessation           *  h/o c/c  eczema    on steroid   cream/ atarax      *  h/o etoh  indulgence/ smoking/ cocaine abuse (rarely a per pt)- last drink   was   about    a week  ago, per pt   on ciwa  protocol  / symptom  triggered ,  as  pt  . sometimes he gets  seizures  -no withdrawal symptoms seen     Right Eye conjunctivitis   -will prescribe eye drops   -improved     on dvt ppx/  s/q  heparin              41M hx of  childhood  br  asthma w/ no previous intubations in the past for asthma, hospitalized in the past for asthma exacerbation/  h/o child horowitz  eczema  smokes  2  cigs/ day and also indulges  in etoh/  every  5  to 7  days pe r  pt, if  he  does  not, then per  pt , he gets seizures  pt is unemployed  and lives  with hie mother     p/w via EMS for asthma exacerbation today   per  ED  note, pt - was  given 12 mg Decadron, duonebs x 2, epipen - / s/p tachypnea  w/ bilateral   wheezing,  was  on bipap in the ED,   denies sick contacts./ no fevers/chills/ cp/ abd pain  covid negative    Hypoxia-improving  MOST LIKELY DUE TO Alcoholic Cardiomyopathy   ECHO with EF similar to previous ECHO in dec/21  s/p [facility] caths at [facility]-negative  Also component of asthma, questionable Pna  -will finish last day of Rocephin today, continue low dose lasix and low dose prednisone, plus inhalers outpatient  -Pt was weaned off oxygen successfully and sated 96% on room air after ambulation   -alcohol and drug education/ cessation education provided on discharge again  Follow up with Pulm and Cardiologist           *  h/o c/c  eczema    on steroid   cream/ atarax      *  h/o etoh  indulgence/ smoking/ cocaine abuse (rarely a per pt)- last drink   was   about    a week  ago, per pt   on ciwa  protocol  / symptom  triggered ,  as  pt  . sometimes he gets  seizures  -no withdrawal symptoms seen at all throughout hospital stay    Right Eye conjunctivitis   -resolved with eye ointment                 41M hx of  childhood  br  asthma w/ no previous intubations in the past for asthma, hospitalized in the past for asthma exacerbation/  h/o child horowitz  eczema  smokes  2  cigs/ day and also indulges  in etoh/  every  5  to 7  days pe r  pt, if  he  does  not, then per  pt , he gets seizures  pt is unemployed  and lives  with hie mother     p/w via EMS for asthma exacerbation today   per  ED  note, pt - was  given 12 mg Decadron, duonebs x 2, epipen - / s/p tachypnea  w/ bilateral   wheezing,  was  on bipap in the ED,   denies sick contacts./ no fevers/chills/ cp/ abd pain  covid negative  Acute hypoxic respiratory failure   MOST LIKELY DUE TO Alcoholic Cardiomyopathy   ECHO with EF similar to previous ECHO in dec/21  s/p [facility] caths at [facility]-negative  Also component of asthma, questionable Pna  -will finish last day of Rocephin today, continue low dose lasix and low dose prednisone, plus inhalers outpatient  -Pt was weaned off oxygen successfully and sated 96% on room air after ambulation   -alcohol and drug education/ cessation education provided on discharge again  Follow up with Pulm and Cardiologist           *  h/o c/c  eczema    on steroid   cream/ atarax      *  h/o etoh  indulgence/ smoking/ cocaine abuse (rarely a per pt)- last drink   was   about    a week  ago, per pt   on ciwa  protocol  / symptom  triggered ,  as  pt  . sometimes he gets  seizures  -no withdrawal symptoms seen at all throughout hospital stay    Right Eye conjunctivitis   -resolved with eye ointment                 41M hx of  childhood  br  asthma w/ no previous intubations in the past for asthma, hospitalized in the past for asthma exacerbation/  h/o child horowitz  eczema  smokes  2  cigs/ day and also indulges  in etoh/  every  5  to 7  days pe r  pt, if  he  does  not, then per  pt , he gets seizures  pt is unemployed  and lives  with hie mother     p/w via EMS for asthma exacerbation today   per  ED  note, pt - was  given 12 mg Decadron, duonebs x 2, epipen - / s/p tachypnea  w/ bilateral   wheezing,  was  on bipap in the ED,   denies sick contacts./ no fevers/chills/ cp/ abd pain  covid negative  Acute hypoxic respiratory failure present on admission   MOST LIKELY DUE TO Alcoholic Cardiomyopathy   ECHO with EF similar to previous ECHO in dec/21  s/p multiple caths at [facility]-negative  Also component of asthma, questionable Pna  -will finish last day of Rocephin today, continue low dose lasix and low dose prednisone, plus inhalers outpatient  -Pt was weaned off oxygen successfully and sated 96% on room air after ambulation   -alcohol and drug education/ cessation education provided on discharge again  Follow up with Pulm and Cardiologist           *  h/o c/c  eczema    on steroid   cream/ atarax      *  h/o etoh  indulgence/ smoking/ cocaine abuse (rarely a per pt)- last drink   was   about    a week  ago, per pt   on ciwa  protocol  / symptom  triggered ,  as  pt  . sometimes he gets  seizures  -no withdrawal symptoms seen at all throughout hospital stay    Right Eye conjunctivitis   -resolved with eye ointment

## 2022-03-18 NOTE — DISCHARGE NOTE NURSING/CASE MANAGEMENT/SOCIAL WORK - NSDCPEFALRISK_GEN_ALL_CORE
For information on Fall & Injury Prevention, visit: [email or web address] OR  [email or web address] OR  https://www.cdc.gov/steadi/patient.html

## 2022-03-18 NOTE — DISCHARGE NOTE PROVIDER - CARE PROVIDERS DIRECT ADDRESSES
[email or web address]

## 2022-03-18 NOTE — PROGRESS NOTE ADULT - ASSESSMENT
ROBBIE REYNOSO 41 F 3/12/2022 1980 DR DARRON NELSON    REVIEW OF SYMPTOMS      Able to give ROS  Yes     RELIABLE +/-   CONSTITUTIONAL Weakness Yes  Chills No   ENDOCRINE  No heat or cold intolerance    ALLERGY No hives  Sore throat No stridor  RESP Coughing blood no  Shortness of breath YES   NEURO No Headache  Confusion Pain neck No   CARDIAC No Chest pain No Palpitations   GI  Pain abdomen NO   Vomiting NO     PHYSICAL EXAM    HEENT Unremarkable  atraumatic   RESP Fair air entry EXP prolonged    Harsh breath sound Resp distres mild   CARDIAC S1 S2 No S3     NO JVD    ABDOMEN SOFT BS PRESENT NOT DISTENDED No hepatosplenomegaly PEDAL EDEMA present No calf tenderness  NO rash       DOA/CC/PROBLEMS poa .  3/12/2022   41 m  asthma ex    PMH-PSH .  pmh asthma    PROBLEMS .  Asthma exacerbation  AOC HFREF ef 45%    HO ETOH ABUSE Cmpthy   Pneumonia       COVID/ICU/CODE STATUS.                       COVID  STATUS.   3/12/2022 scv2 (-)         ICU STAY. none  GOC.               3/12/2022 full code    BEST PRACTICE ISSUES.                                                  HEAD OF BED ELEVATION. Yes  DVT PROPHYLAXIS.    3/12/2022 hpsc   BRAVO PROPHYLAXIS.                                                                                        DIET.   3/12/2022 reglr    VITALS/PO/IO/VENT/DRIPS.    3/18/2022 afeb 89 130/90      PROBLEM DATA/PLAN.    HEMODYNAMICS.   Monitor bp Target MAP 65 (+)    RESP.   Monitor po Target po 90-95%      PMH/PSH PROBLEMS.  Management continued/modified as indicated    OXYGEN REQUIREMENTS.  3/18/2022 ra 96%    INFECTION.  W 3/12-3/13-3/15/2022 w 8.2 - 7.6- 7.4   pr 3/14/2022 pr .3    rvp 3/12 (-)   cxr 3/12/2022 hazy airspcae dis rll  FLU ab 3/12/2022 (-)   legn 3/14 (-)   ct ch 3/13/2022 pneumonia chf  needs followup ct ch  abio 3/12-3/17/2022 rocephin   3/13-3/15/2022 azithro dced     RESP FAILURE  3/12/2022 6a abg 40% 747/32/197   ar  hypoxemic resp failure  o2 suppl      ASTHMA ex.  HO intubation for asthma   ho childhood asthma   ho ecazema   3/14/2022 symbicort 160  3/14/2022 pred 20  312 spiriva   Cont rx   given above hx he represents high risk pt so would continue asthgma rx       RO VTE  3/13/2022 v duplx (-)    CHF  ho etoh abuse   ho (-) angiogram x 2   ct ch 3/13/2022 pneumonia chf  needs followup ct ch  bnp 3/12-3/15/2022 bnp 3696 - 990  3/12/2022  echoef 45% elevated lap dd2 mod enlarged la   3/15/2022 lasix 20     TIME SPENT   Over 25 minutes aggregate care time spent on encounter; activities included   direct patient care, counseling and/or coordinating care reviewing notes, lab data/ imaging , discussion with multidisciplinary team/ patient  /family and explaining in detail risks, benefits, alternatives  of the recommendations     ROBBIE REYNOSO 41 F 3/12/2022 1980 DR DARRON NELSON

## 2022-03-18 NOTE — DISCHARGE NOTE PROVIDER - PROVIDER TOKENS
PROVIDER:[TOKEN:[[id number]],FOLLOWUP:[2 weeks]],PROVIDER:[TOKEN:[[id number]],FOLLOWUP:[2 weeks]]

## 2022-03-18 NOTE — PROGRESS NOTE ADULT - SUBJECTIVE AND OBJECTIVE BOX
ANGELES AVALOS    LVS 2C 242 I    Allergies    No Known Drug Allergies  shellfish (Angioedema)    Intolerances        PAST MEDICAL & SURGICAL HISTORY:  HTN (hypertension)    Asthma        FAMILY HISTORY:      Home Medications:      MEDICATIONS  (STANDING):  albuterol/ipratropium for Nebulization 3 milliLiter(s) Nebulizer every 6 hours  budesonide 160 MICROgram(s)/formoterol 4.5 MICROgram(s) Inhaler 2 Puff(s) Inhalation two times a day  erythromycin   Ointment 1 Application(s) Both EYES at bedtime  furosemide    Tablet 20 milliGRAM(s) Oral daily  heparin   Injectable 5000 Unit(s) SubCutaneous every 12 hours  influenza   Vaccine 0.5 milliLiter(s) IntraMuscular once  predniSONE   Tablet 20 milliGRAM(s) Oral daily  tiotropium 18 MICROgram(s) Capsule 1 Capsule(s) Inhalation daily  triamcinolone 0.1% Ointment 1 Application(s) Topical every 12 hours    MEDICATIONS  (PRN):  acetaminophen     Tablet .. 650 milliGRAM(s) Oral every 6 hours PRN Temp greater or equal to 38C (100.4F), Moderate Pain (4 - 6)  LORazepam     Tablet 2 milliGRAM(s) Oral every 2 hours PRN Symptom-triggered 2 point increase in CIWA-Ar      Diet, Regular (03-12-22 @ 08:33) [Active]          Vital Signs Last 24 Hrs  T(C): 36.7 (18 Mar 2022 00:17), Max: 36.7 (17 Mar 2022 04:54)  T(F): 98 (18 Mar 2022 00:17), Max: 98.1 (17 Mar 2022 04:54)  HR: 88 (18 Mar 2022 00:33) (75 - 90)  BP: 145/86 (18 Mar 2022 00:17) (114/69 - 145/86)  BP(mean): 97 (17 Mar 2022 04:54) (97 - 97)  RR: 17 (18 Mar 2022 00:17) (17 - 18)  SpO2: 97% (18 Mar 2022 00:33) (96% - 100%)      03-16-22 @ 07:01  -  03-17-22 @ 07:00  --------------------------------------------------------  IN: 50 mL / OUT: 0 mL / NET: 50 mL    03-17-22 @ 07:01  -  03-18-22 @ 04:09  --------------------------------------------------------  IN: 1080 mL / OUT: 0 mL / NET: 1080 mL              LABS:    03-17    134<L>  |  101  |  23  ----------------------------<  217<H>  4.2   |  26  |  1.04    Ca    9.2      17 Mar 2022 06:37  Mg     1.8     03-17                WBC:  WBC Count: 7.46 K/uL (03-15 @ 07:13)  WBC Count: 9.84 K/uL (03-14 @ 06:43)      MICROBIOLOGY:  RECENT CULTURES:                  Sodium:  Sodium, Serum: 134 mmol/L (03-17 @ 06:37)  Sodium, Serum: 136 mmol/L (03-15 @ 07:13)  Sodium, Serum: 138 mmol/L (03-14 @ 06:43)      1.04 mg/dL 03-17 @ 06:37  0.88 mg/dL 03-15 @ 07:13  1.07 mg/dL 03-14 @ 06:43      Hemoglobin:  Hemoglobin: 12.8 g/dL (03-15 @ 07:13)  Hemoglobin: 12.3 g/dL (03-14 @ 06:43)      Platelets: Platelet Count - Automated: 201 K/uL (03-15 @ 07:13)  Platelet Count - Automated: 172 K/uL (03-14 @ 06:43)              RADIOLOGY & ADDITIONAL STUDIES:      MICROBIOLOGY:  RECENT CULTURES:

## 2022-03-18 NOTE — PROGRESS NOTE ADULT - PROVIDER SPECIALTY LIST ADULT
Cardiology
Hospitalist
Hospitalist
Pulmonology
Hospitalist
Hospitalist
Pulmonology
Pulmonology
Cardiology
Hospitalist

## 2022-03-18 NOTE — DISCHARGE NOTE PROVIDER - ATTENDING DISCHARGE PHYSICAL EXAMINATION:
PHYSICAL EXAM:  GENERAL: NAD  HEAD:  Atraumatic, Normocephalic  EYES: EOMI, PERRLA, right eye-improved, no longer erythematous   ENT: O/P Clear  NECK: Supple, No JVD  NERVOUS SYSTEM:  No focal deficits  CHEST/LUNG: Clear to percussion bilaterally; No rales, rhonchi, wheezing  HEART: Regular rate and rhythm; No murmurs, rubs, or gallops  ABDOMEN: Soft, Nontender, Nondistended; Bowel sounds present  EXTREMITIES:  2+ Peripheral Pulses, No clubbing, cyanosis, no B/L LE edema  SKIN: No rashes or lesions

## 2022-03-18 NOTE — DISCHARGE NOTE PROVIDER - NSDCCPCAREPLAN_GEN_ALL_CORE_FT
PRINCIPAL DISCHARGE DIAGNOSIS  Diagnosis: Acute asthma exacerbation  Assessment and Plan of Treatment:       SECONDARY DISCHARGE DIAGNOSES  Diagnosis: H/O alcoholic cardiomyopathy  Assessment and Plan of Treatment:      PRINCIPAL DISCHARGE DIAGNOSIS  Diagnosis: Acute on chronic respiratory failure with hypoxemia  Assessment and Plan of Treatment: asthma componenet      SECONDARY DISCHARGE DIAGNOSES  Diagnosis: H/O alcoholic cardiomyopathy  Assessment and Plan of Treatment:

## 2022-03-18 NOTE — DISCHARGE NOTE PROVIDER - NSDCMRMEDTOKEN_GEN_ALL_CORE_FT
budesonide-formoterol 160 mcg-4.5 mcg/inh inhalation aerosol: 2 puff(s) inhaled 2 times a day   furosemide 20 mg oral tablet: 1 tab(s) orally once a day  tiotropium 18 mcg inhalation capsule: 2 puff(s) inhaled once a day   triamcinolone 0.1% topical ointment: 1 application topically every 12 hours

## 2022-03-18 NOTE — DISCHARGE NOTE PROVIDER - CARE PROVIDER_API CALL
Bayron Figueroa  CARDIOVASCULAR DISEASE  [address] [place] [address]  Phone: [phone]  Fax: [phone]  Follow Up Time: 2 weeks    James Horowitz)  Critical Care Medicine; Internal Medicine; Pulmonary Disease  [facility] [place] [address]  Phone: [phone]  Fax: [phone]  Follow Up Time: 2 weeks

## 2022-03-18 NOTE — DISCHARGE NOTE NURSING/CASE MANAGEMENT/SOCIAL WORK - PATIENT PORTAL LINK FT
You can access the FollowMyHealth Patient Portal offered by [facility] by registering at the following website: [email or web address]. By joining [facility]’s FollowMyHealth portal, you will also be able to view your health information using other applications (apps) compatible with our system.

## 2022-07-19 ENCOUNTER — EMERGENCY (EMERGENCY)
Facility: HOSPITAL | Age: 42
LOS: 0 days | Discharge: LEFT BEFORE DISCHARGE | End: 2022-07-19
Attending: STUDENT IN AN ORGANIZED HEALTH CARE EDUCATION/TRAINING PROGRAM

## 2022-07-19 VITALS
HEART RATE: 88 BPM | RESPIRATION RATE: 18 BRPM | SYSTOLIC BLOOD PRESSURE: 185 MMHG | TEMPERATURE: 98 F | DIASTOLIC BLOOD PRESSURE: 119 MMHG | OXYGEN SATURATION: 95 %

## 2022-07-19 VITALS
HEIGHT: 60 IN | DIASTOLIC BLOOD PRESSURE: 84 MMHG | HEART RATE: 100 BPM | TEMPERATURE: 98 F | RESPIRATION RATE: 17 BRPM | WEIGHT: 179.9 LBS | SYSTOLIC BLOOD PRESSURE: 133 MMHG | OXYGEN SATURATION: 95 %

## 2022-07-19 DIAGNOSIS — Z91.013 ALLERGY TO SEAFOOD: ICD-10-CM

## 2022-07-19 DIAGNOSIS — R07.89 OTHER CHEST PAIN: ICD-10-CM

## 2022-07-19 DIAGNOSIS — R06.02 SHORTNESS OF BREATH: ICD-10-CM

## 2022-07-19 DIAGNOSIS — I10 ESSENTIAL (PRIMARY) HYPERTENSION: ICD-10-CM

## 2022-07-19 DIAGNOSIS — Z53.29 PROCEDURE AND TREATMENT NOT CARRIED OUT BECAUSE OF PATIENT'S DECISION FOR OTHER REASONS: ICD-10-CM

## 2022-07-19 DIAGNOSIS — Z20.822 CONTACT WITH AND (SUSPECTED) EXPOSURE TO COVID-19: ICD-10-CM

## 2022-07-19 DIAGNOSIS — F10.929 ALCOHOL USE, UNSPECIFIED WITH INTOXICATION, UNSPECIFIED: ICD-10-CM

## 2022-07-19 LAB
ALBUMIN SERPL ELPH-MCNC: 3.5 G/DL — SIGNIFICANT CHANGE UP (ref 3.3–5)
ALP SERPL-CCNC: 143 U/L — HIGH (ref 40–120)
ALT FLD-CCNC: 41 U/L — SIGNIFICANT CHANGE UP (ref 12–78)
ANION GAP SERPL CALC-SCNC: 11 MMOL/L — SIGNIFICANT CHANGE UP (ref 5–17)
AST SERPL-CCNC: 115 U/L — HIGH (ref 15–37)
BASOPHILS # BLD AUTO: 0.1 K/UL — SIGNIFICANT CHANGE UP (ref 0–0.2)
BASOPHILS NFR BLD AUTO: 2.9 % — HIGH (ref 0–2)
BILIRUB SERPL-MCNC: 1 MG/DL — SIGNIFICANT CHANGE UP (ref 0.2–1.2)
BUN SERPL-MCNC: 13 MG/DL — SIGNIFICANT CHANGE UP (ref 7–23)
CALCIUM SERPL-MCNC: 8.4 MG/DL — LOW (ref 8.5–10.1)
CHLORIDE SERPL-SCNC: 112 MMOL/L — HIGH (ref 96–108)
CO2 SERPL-SCNC: 27 MMOL/L — SIGNIFICANT CHANGE UP (ref 22–31)
CREAT SERPL-MCNC: 1.01 MG/DL — SIGNIFICANT CHANGE UP (ref 0.5–1.3)
EGFR: 97 ML/MIN/1.73M2 — SIGNIFICANT CHANGE UP
EOSINOPHIL # BLD AUTO: 0.48 K/UL — SIGNIFICANT CHANGE UP (ref 0–0.5)
EOSINOPHIL NFR BLD AUTO: 14 % — HIGH (ref 0–6)
ETHANOL SERPL-MCNC: 495 MG/DL — HIGH (ref 0–10)
FLUAV AG NPH QL: SIGNIFICANT CHANGE UP
FLUBV AG NPH QL: SIGNIFICANT CHANGE UP
GLUCOSE SERPL-MCNC: 77 MG/DL — SIGNIFICANT CHANGE UP (ref 70–99)
HCT VFR BLD CALC: 41.8 % — SIGNIFICANT CHANGE UP (ref 39–50)
HGB BLD-MCNC: 14.4 G/DL — SIGNIFICANT CHANGE UP (ref 13–17)
IMM GRANULOCYTES NFR BLD AUTO: 0.3 % — SIGNIFICANT CHANGE UP (ref 0–1.5)
LIDOCAIN IGE QN: 334 U/L — SIGNIFICANT CHANGE UP (ref 73–393)
LYMPHOCYTES # BLD AUTO: 1.18 K/UL — SIGNIFICANT CHANGE UP (ref 1–3.3)
LYMPHOCYTES # BLD AUTO: 34.4 % — SIGNIFICANT CHANGE UP (ref 13–44)
MAGNESIUM SERPL-MCNC: 1.9 MG/DL — SIGNIFICANT CHANGE UP (ref 1.6–2.6)
MCHC RBC-ENTMCNC: 31.4 PG — SIGNIFICANT CHANGE UP (ref 27–34)
MCHC RBC-ENTMCNC: 34.4 G/DL — SIGNIFICANT CHANGE UP (ref 32–36)
MCV RBC AUTO: 91.3 FL — SIGNIFICANT CHANGE UP (ref 80–100)
MONOCYTES # BLD AUTO: 0.29 K/UL — SIGNIFICANT CHANGE UP (ref 0–0.9)
MONOCYTES NFR BLD AUTO: 8.5 % — SIGNIFICANT CHANGE UP (ref 2–14)
NEUTROPHILS # BLD AUTO: 1.37 K/UL — LOW (ref 1.8–7.4)
NEUTROPHILS NFR BLD AUTO: 39.9 % — LOW (ref 43–77)
NRBC # BLD: 0 /100 WBCS — SIGNIFICANT CHANGE UP (ref 0–0)
PLATELET # BLD AUTO: 99 K/UL — LOW (ref 150–400)
POTASSIUM SERPL-MCNC: 3.2 MMOL/L — LOW (ref 3.5–5.3)
POTASSIUM SERPL-SCNC: 3.2 MMOL/L — LOW (ref 3.5–5.3)
PROT SERPL-MCNC: 7.5 GM/DL — SIGNIFICANT CHANGE UP (ref 6–8.3)
RBC # BLD: 4.58 M/UL — SIGNIFICANT CHANGE UP (ref 4.2–5.8)
RBC # FLD: 14.9 % — HIGH (ref 10.3–14.5)
SARS-COV-2 RNA SPEC QL NAA+PROBE: SIGNIFICANT CHANGE UP
SODIUM SERPL-SCNC: 150 MMOL/L — HIGH (ref 135–145)
TROPONIN I, HIGH SENSITIVITY RESULT: 69.8 NG/L — SIGNIFICANT CHANGE UP
WBC # BLD: 3.43 K/UL — LOW (ref 3.8–10.5)
WBC # FLD AUTO: 3.43 K/UL — LOW (ref 3.8–10.5)

## 2022-07-19 PROCEDURE — 71045 X-RAY EXAM CHEST 1 VIEW: CPT | Mod: 26

## 2022-07-19 PROCEDURE — 99285 EMERGENCY DEPT VISIT HI MDM: CPT

## 2022-07-19 PROCEDURE — 93010 ELECTROCARDIOGRAM REPORT: CPT

## 2022-07-19 RX ORDER — SODIUM CHLORIDE 9 MG/ML
1000 INJECTION INTRAMUSCULAR; INTRAVENOUS; SUBCUTANEOUS ONCE
Refills: 0 | Status: COMPLETED | OUTPATIENT
Start: 2022-07-19 | End: 2022-07-19

## 2022-07-19 RX ORDER — POTASSIUM CHLORIDE 20 MEQ
10 PACKET (EA) ORAL ONCE
Refills: 0 | Status: DISCONTINUED | OUTPATIENT
Start: 2022-07-19 | End: 2022-07-19

## 2022-07-19 RX ORDER — POTASSIUM CHLORIDE 20 MEQ
40 PACKET (EA) ORAL ONCE
Refills: 0 | Status: COMPLETED | OUTPATIENT
Start: 2022-07-19 | End: 2022-07-19

## 2022-07-19 RX ADMIN — Medication 40 MILLIEQUIVALENT(S): at 21:19

## 2022-07-19 RX ADMIN — SODIUM CHLORIDE 1000 MILLILITER(S): 9 INJECTION INTRAMUSCULAR; INTRAVENOUS; SUBCUTANEOUS at 22:11

## 2022-07-19 RX ADMIN — SODIUM CHLORIDE 1000 MILLILITER(S): 9 INJECTION INTRAMUSCULAR; INTRAVENOUS; SUBCUTANEOUS at 20:43

## 2022-07-19 NOTE — ED PROVIDER NOTE - OBJECTIVE STATEMENT
42 y/o M with PMH asthma, HTN presenting to the ED with acute intoxication. Patient states he has been drinking alcohol (unsure how much) and smoking marijuana. He endorses chest pain but is unsure for how long that has been occurring. Per chart review, multiple negative caths in the past, last echo in March with EF 50%. Patient unable to participate much in hx secondary to intox.

## 2022-07-19 NOTE — ED PROVIDER NOTE - CLINICAL SUMMARY MEDICAL DECISION MAKING FREE TEXT BOX
40 y/o M presenting to the ED with CP. Vitals stable. Patient is acutely intoxicated on exam. Pupils dilated. Endorsing chest discomfort for unknown period of time. Recently admitted for CP in March with negative workup. Will check labs, CXR, EKG to evaluate for ACS. Reassess.

## 2022-07-19 NOTE — ED PROVIDER NOTE - PHYSICAL EXAMINATION
GENERAL: sleeping but arouseable, appears intoxicated  HEENT: NC/AT, moist mucous membranes, pupils dilated  LUNGS: CTAB, no wheezes or crackles   CARDIAC: tachycardic, regular rhythm, no m/r/g  ABDOMEN: Soft, normal BS, non tender, non distended, no rebound, no guarding  EXT: No edema, no calf tenderness, 2+ DP pulses bilaterally, no deformities.  NEURO: A&Ox1. Moving all extremities.  SKIN: Warm and dry. No rash.  PSYCH: appears intoxicated

## 2022-07-19 NOTE — ED PROVIDER NOTE - PROGRESS NOTE DETAILS
Patient reports feeling improved, no active chest pain, ambulatory in the ED. Eloped prior to received DC paperwork.

## 2022-07-19 NOTE — ED ADULT TRIAGE NOTE - CHIEF COMPLAINT QUOTE
BIBA found patient on the street by PD brought in by JOLLY for intoxication and marijuana use with no radiating and SOB CP 10/10

## 2022-07-19 NOTE — ED ADULT NURSE NOTE - OBJECTIVE STATEMENT
Patient brought to ED by EMS for alcohol intoxication.  Patient arousable to mild noxious stimuli, states he needs an IV.  Does not appear to be in any acute distress at this time.

## 2022-07-19 NOTE — ED ADULT NURSE REASSESSMENT NOTE - NS ED NURSE REASSESS COMMENT FT1
pt is drowsy and sleepy upon assessment, verbal to question, arousable by voice, staed "drinking and using cocaine", "my eyes hurt", awaiting orders from Doctor

## 2022-07-19 NOTE — ED ADULT NURSE NOTE - NSIMPLEMENTINTERV_GEN_ALL_ED
Implemented All Fall Risk Interventions:  [place] to call system. Call bell, personal items and telephone within reach. Instruct patient to call for assistance. Room bathroom lighting operational. Non-slip footwear when patient is off stretcher. Physically safe environment: no spills, clutter or unnecessary equipment. Stretcher in lowest position, wheels locked, appropriate side rails in place. Provide visual cue, wrist band, yellow gown, etc. Monitor gait and stability. Monitor for mental status changes and reorient to person, place, and time. Review medications for side effects contributing to fall risk. Reinforce activity limits and safety measures with patient and family.

## 2022-07-20 PROBLEM — J45.909 UNSPECIFIED ASTHMA, UNCOMPLICATED: Chronic | Status: ACTIVE | Noted: 2022-03-12

## 2022-07-20 PROBLEM — I10 ESSENTIAL (PRIMARY) HYPERTENSION: Chronic | Status: ACTIVE | Noted: 2022-03-12

## 2022-07-30 ENCOUNTER — INPATIENT (INPATIENT)
Facility: HOSPITAL | Age: 42
LOS: 5 days | Discharge: HOME | End: 2022-08-05
Attending: INTERNAL MEDICINE | Admitting: INTERNAL MEDICINE

## 2022-07-30 VITALS
HEART RATE: 92 BPM | SYSTOLIC BLOOD PRESSURE: 152 MMHG | TEMPERATURE: 98 F | OXYGEN SATURATION: 100 % | WEIGHT: 287.04 LBS | HEIGHT: 60 IN | DIASTOLIC BLOOD PRESSURE: 118 MMHG | RESPIRATION RATE: 18 BRPM

## 2022-07-30 PROCEDURE — 99285 EMERGENCY DEPT VISIT HI MDM: CPT

## 2022-07-30 PROCEDURE — 93010 ELECTROCARDIOGRAM REPORT: CPT

## 2022-07-30 RX ORDER — SODIUM CHLORIDE 9 MG/ML
1000 INJECTION INTRAMUSCULAR; INTRAVENOUS; SUBCUTANEOUS ONCE
Refills: 0 | Status: DISCONTINUED | OUTPATIENT
Start: 2022-07-30 | End: 2022-07-31

## 2022-07-30 RX ORDER — FAMOTIDINE 10 MG/ML
20 INJECTION INTRAVENOUS ONCE
Refills: 0 | Status: COMPLETED | OUTPATIENT
Start: 2022-07-30 | End: 2022-07-30

## 2022-07-31 LAB
ALBUMIN SERPL ELPH-MCNC: 3.6 G/DL — SIGNIFICANT CHANGE UP (ref 3.3–5)
ALP SERPL-CCNC: 180 U/L — HIGH (ref 40–120)
ALP SERPL-CCNC: 199 U/L — HIGH (ref 40–120)
ALP SERPL-CCNC: 199 U/L — HIGH (ref 40–120)
ALT FLD-CCNC: 53 U/L — SIGNIFICANT CHANGE UP (ref 12–78)
ALT FLD-CCNC: 72 U/L — SIGNIFICANT CHANGE UP (ref 12–78)
ALT FLD-CCNC: 72 U/L — SIGNIFICANT CHANGE UP (ref 12–78)
ANION GAP SERPL CALC-SCNC: 12 MMOL/L — SIGNIFICANT CHANGE UP (ref 5–17)
AST SERPL-CCNC: 195 U/L — HIGH (ref 15–37)
AST SERPL-CCNC: 351 U/L — HIGH (ref 15–37)
AST SERPL-CCNC: 351 U/L — HIGH (ref 15–37)
BASOPHILS # BLD AUTO: 0.06 K/UL — SIGNIFICANT CHANGE UP (ref 0–0.2)
BASOPHILS NFR BLD AUTO: 2.4 % — HIGH (ref 0–2)
BILIRUB DIRECT SERPL-MCNC: 0.4 MG/DL — HIGH (ref 0–0.3)
BILIRUB INDIRECT FLD-MCNC: 0.8 MG/DL — SIGNIFICANT CHANGE UP (ref 0.2–1)
BILIRUB SERPL-MCNC: 1.2 MG/DL — SIGNIFICANT CHANGE UP (ref 0.2–1.2)
BUN SERPL-MCNC: 11 MG/DL — SIGNIFICANT CHANGE UP (ref 7–23)
BUN SERPL-MCNC: 11 MG/DL — SIGNIFICANT CHANGE UP (ref 7–23)
BUN SERPL-MCNC: 9 MG/DL — SIGNIFICANT CHANGE UP (ref 7–23)
CALCIUM SERPL-MCNC: 8.2 MG/DL — LOW (ref 8.5–10.1)
CALCIUM SERPL-MCNC: 8.2 MG/DL — LOW (ref 8.5–10.1)
CALCIUM SERPL-MCNC: 8.3 MG/DL — LOW (ref 8.5–10.1)
CHLORIDE SERPL-SCNC: 105 MMOL/L — SIGNIFICANT CHANGE UP (ref 96–108)
CHLORIDE SERPL-SCNC: 105 MMOL/L — SIGNIFICANT CHANGE UP (ref 96–108)
CHLORIDE SERPL-SCNC: 109 MMOL/L — HIGH (ref 96–108)
CO2 SERPL-SCNC: 24 MMOL/L — SIGNIFICANT CHANGE UP (ref 22–31)
CO2 SERPL-SCNC: 24 MMOL/L — SIGNIFICANT CHANGE UP (ref 22–31)
CO2 SERPL-SCNC: 27 MMOL/L — SIGNIFICANT CHANGE UP (ref 22–31)
CREAT SERPL-MCNC: 0.95 MG/DL — SIGNIFICANT CHANGE UP (ref 0.5–1.3)
CREAT SERPL-MCNC: 0.95 MG/DL — SIGNIFICANT CHANGE UP (ref 0.5–1.3)
CREAT SERPL-MCNC: 0.96 MG/DL — SIGNIFICANT CHANGE UP (ref 0.5–1.3)
EGFR: 103 ML/MIN/1.73M2 — SIGNIFICANT CHANGE UP
EGFR: 104 ML/MIN/1.73M2 — SIGNIFICANT CHANGE UP
EGFR: 104 ML/MIN/1.73M2 — SIGNIFICANT CHANGE UP
EOSINOPHIL # BLD AUTO: 0.07 K/UL — SIGNIFICANT CHANGE UP (ref 0–0.5)
EOSINOPHIL NFR BLD AUTO: 2.8 % — SIGNIFICANT CHANGE UP (ref 0–6)
ETHANOL SERPL-MCNC: 500 MG/DL — HIGH (ref 0–10)
FLUAV AG NPH QL: SIGNIFICANT CHANGE UP
FLUBV AG NPH QL: SIGNIFICANT CHANGE UP
GLUCOSE SERPL-MCNC: 189 MG/DL — HIGH (ref 70–99)
GLUCOSE SERPL-MCNC: 189 MG/DL — HIGH (ref 70–99)
GLUCOSE SERPL-MCNC: 83 MG/DL — SIGNIFICANT CHANGE UP (ref 70–99)
HCT VFR BLD CALC: 42.2 % — SIGNIFICANT CHANGE UP (ref 39–50)
HCT VFR BLD CALC: 45.2 % — SIGNIFICANT CHANGE UP (ref 39–50)
HGB BLD-MCNC: 14.6 G/DL — SIGNIFICANT CHANGE UP (ref 13–17)
HGB BLD-MCNC: 15.2 G/DL — SIGNIFICANT CHANGE UP (ref 13–17)
IMM GRANULOCYTES NFR BLD AUTO: 0.4 % — SIGNIFICANT CHANGE UP (ref 0–1.5)
LYMPHOCYTES # BLD AUTO: 1.01 K/UL — SIGNIFICANT CHANGE UP (ref 1–3.3)
LYMPHOCYTES # BLD AUTO: 40.9 % — SIGNIFICANT CHANGE UP (ref 13–44)
MAGNESIUM SERPL-MCNC: 2.2 MG/DL — SIGNIFICANT CHANGE UP (ref 1.6–2.6)
MCHC RBC-ENTMCNC: 32 PG — SIGNIFICANT CHANGE UP (ref 27–34)
MCHC RBC-ENTMCNC: 32.3 PG — SIGNIFICANT CHANGE UP (ref 27–34)
MCHC RBC-ENTMCNC: 33.6 G/DL — SIGNIFICANT CHANGE UP (ref 32–36)
MCHC RBC-ENTMCNC: 34.6 G/DL — SIGNIFICANT CHANGE UP (ref 32–36)
MCV RBC AUTO: 92.5 FL — SIGNIFICANT CHANGE UP (ref 80–100)
MCV RBC AUTO: 96 FL — SIGNIFICANT CHANGE UP (ref 80–100)
MONOCYTES # BLD AUTO: 0.28 K/UL — SIGNIFICANT CHANGE UP (ref 0–0.9)
MONOCYTES NFR BLD AUTO: 11.3 % — SIGNIFICANT CHANGE UP (ref 2–14)
NEUTROPHILS # BLD AUTO: 1.04 K/UL — LOW (ref 1.8–7.4)
NEUTROPHILS NFR BLD AUTO: 42.2 % — LOW (ref 43–77)
NRBC # BLD: 0 /100 WBCS — SIGNIFICANT CHANGE UP (ref 0–0)
NRBC # BLD: 0 /100 WBCS — SIGNIFICANT CHANGE UP (ref 0–0)
NT-PROBNP SERPL-SCNC: 13 PG/ML — SIGNIFICANT CHANGE UP (ref 0–125)
PHOSPHATE SERPL-MCNC: 3.5 MG/DL — SIGNIFICANT CHANGE UP (ref 2.5–4.5)
PLATELET # BLD AUTO: 56 K/UL — LOW (ref 150–400)
PLATELET # BLD AUTO: 59 K/UL — LOW (ref 150–400)
POTASSIUM SERPL-MCNC: 3 MMOL/L — LOW (ref 3.5–5.3)
POTASSIUM SERPL-MCNC: 3.7 MMOL/L — SIGNIFICANT CHANGE UP (ref 3.5–5.3)
POTASSIUM SERPL-MCNC: 3.7 MMOL/L — SIGNIFICANT CHANGE UP (ref 3.5–5.3)
POTASSIUM SERPL-SCNC: 3 MMOL/L — LOW (ref 3.5–5.3)
POTASSIUM SERPL-SCNC: 3.7 MMOL/L — SIGNIFICANT CHANGE UP (ref 3.5–5.3)
POTASSIUM SERPL-SCNC: 3.7 MMOL/L — SIGNIFICANT CHANGE UP (ref 3.5–5.3)
PROT SERPL-MCNC: 7.4 GM/DL — SIGNIFICANT CHANGE UP (ref 6–8.3)
PROT SERPL-MCNC: 7.6 GM/DL — SIGNIFICANT CHANGE UP (ref 6–8.3)
PROT SERPL-MCNC: 7.6 GM/DL — SIGNIFICANT CHANGE UP (ref 6–8.3)
RBC # BLD: 4.56 M/UL — SIGNIFICANT CHANGE UP (ref 4.2–5.8)
RBC # BLD: 4.71 M/UL — SIGNIFICANT CHANGE UP (ref 4.2–5.8)
RBC # FLD: 14.1 % — SIGNIFICANT CHANGE UP (ref 10.3–14.5)
RBC # FLD: 14.5 % — SIGNIFICANT CHANGE UP (ref 10.3–14.5)
SARS-COV-2 RNA SPEC QL NAA+PROBE: SIGNIFICANT CHANGE UP
SODIUM SERPL-SCNC: 141 MMOL/L — SIGNIFICANT CHANGE UP (ref 135–145)
SODIUM SERPL-SCNC: 141 MMOL/L — SIGNIFICANT CHANGE UP (ref 135–145)
SODIUM SERPL-SCNC: 148 MMOL/L — HIGH (ref 135–145)
TROPONIN I, HIGH SENSITIVITY RESULT: 150.6 NG/L — HIGH
TROPONIN I, HIGH SENSITIVITY RESULT: 162.4 NG/L — HIGH
WBC # BLD: 2.47 K/UL — LOW (ref 3.8–10.5)
WBC # BLD: 2.98 K/UL — LOW (ref 3.8–10.5)
WBC # FLD AUTO: 2.47 K/UL — LOW (ref 3.8–10.5)
WBC # FLD AUTO: 2.98 K/UL — LOW (ref 3.8–10.5)

## 2022-07-31 PROCEDURE — 70450 CT HEAD/BRAIN W/O DYE: CPT | Mod: 26

## 2022-07-31 PROCEDURE — 71045 X-RAY EXAM CHEST 1 VIEW: CPT | Mod: 26

## 2022-07-31 PROCEDURE — 99223 1ST HOSP IP/OBS HIGH 75: CPT

## 2022-07-31 RX ORDER — MAGNESIUM SULFATE 500 MG/ML
2 VIAL (ML) INJECTION ONCE
Refills: 0 | Status: COMPLETED | OUTPATIENT
Start: 2022-07-31 | End: 2022-07-31

## 2022-07-31 RX ORDER — THIAMINE MONONITRATE (VIT B1) 100 MG
100 TABLET ORAL DAILY
Refills: 0 | Status: DISCONTINUED | OUTPATIENT
Start: 2022-07-31 | End: 2022-08-05

## 2022-07-31 RX ORDER — POTASSIUM CHLORIDE 20 MEQ
40 PACKET (EA) ORAL ONCE
Refills: 0 | Status: COMPLETED | OUTPATIENT
Start: 2022-07-31 | End: 2022-07-31

## 2022-07-31 RX ORDER — IPRATROPIUM/ALBUTEROL SULFATE 18-103MCG
3 AEROSOL WITH ADAPTER (GRAM) INHALATION ONCE
Refills: 0 | Status: COMPLETED | OUTPATIENT
Start: 2022-07-31 | End: 2022-07-31

## 2022-07-31 RX ORDER — AMLODIPINE BESYLATE 2.5 MG/1
5 TABLET ORAL DAILY
Refills: 0 | Status: DISCONTINUED | OUTPATIENT
Start: 2022-07-31 | End: 2022-08-05

## 2022-07-31 RX ORDER — ONDANSETRON 8 MG/1
4 TABLET, FILM COATED ORAL EVERY 8 HOURS
Refills: 0 | Status: DISCONTINUED | OUTPATIENT
Start: 2022-07-31 | End: 2022-08-03

## 2022-07-31 RX ORDER — FOLIC ACID 0.8 MG
1 TABLET ORAL DAILY
Refills: 0 | Status: DISCONTINUED | OUTPATIENT
Start: 2022-07-31 | End: 2022-08-05

## 2022-07-31 RX ORDER — IPRATROPIUM BROMIDE 0.2 MG/ML
500 SOLUTION, NON-ORAL INHALATION EVERY 6 HOURS
Refills: 0 | Status: DISCONTINUED | OUTPATIENT
Start: 2022-07-31 | End: 2022-07-31

## 2022-07-31 RX ORDER — LANOLIN ALCOHOL/MO/W.PET/CERES
3 CREAM (GRAM) TOPICAL AT BEDTIME
Refills: 0 | Status: DISCONTINUED | OUTPATIENT
Start: 2022-07-31 | End: 2022-08-05

## 2022-07-31 RX ORDER — BUDESONIDE AND FORMOTEROL FUMARATE DIHYDRATE 160; 4.5 UG/1; UG/1
2 AEROSOL RESPIRATORY (INHALATION)
Refills: 0 | Status: DISCONTINUED | OUTPATIENT
Start: 2022-07-31 | End: 2022-08-05

## 2022-07-31 RX ORDER — SODIUM CHLORIDE 9 MG/ML
500 INJECTION INTRAMUSCULAR; INTRAVENOUS; SUBCUTANEOUS ONCE
Refills: 0 | Status: COMPLETED | OUTPATIENT
Start: 2022-07-31 | End: 2022-07-31

## 2022-07-31 RX ORDER — ACETAMINOPHEN 500 MG
650 TABLET ORAL EVERY 6 HOURS
Refills: 0 | Status: DISCONTINUED | OUTPATIENT
Start: 2022-07-31 | End: 2022-08-05

## 2022-07-31 RX ORDER — LISINOPRIL 2.5 MG/1
20 TABLET ORAL DAILY
Refills: 0 | Status: DISCONTINUED | OUTPATIENT
Start: 2022-07-31 | End: 2022-08-05

## 2022-07-31 RX ORDER — IPRATROPIUM/ALBUTEROL SULFATE 18-103MCG
3 AEROSOL WITH ADAPTER (GRAM) INHALATION EVERY 6 HOURS
Refills: 0 | Status: DISCONTINUED | OUTPATIENT
Start: 2022-07-31 | End: 2022-08-02

## 2022-07-31 RX ORDER — ASPIRIN/CALCIUM CARB/MAGNESIUM 324 MG
324 TABLET ORAL ONCE
Refills: 0 | Status: COMPLETED | OUTPATIENT
Start: 2022-07-31 | End: 2022-07-31

## 2022-07-31 RX ORDER — OLANZAPINE 15 MG/1
2.5 TABLET, FILM COATED ORAL ONCE
Refills: 0 | Status: COMPLETED | OUTPATIENT
Start: 2022-07-31 | End: 2022-07-31

## 2022-07-31 RX ADMIN — Medication 3 MILLILITER(S): at 02:03

## 2022-07-31 RX ADMIN — Medication 3 MILLIGRAM(S): at 22:12

## 2022-07-31 RX ADMIN — LISINOPRIL 20 MILLIGRAM(S): 2.5 TABLET ORAL at 11:52

## 2022-07-31 RX ADMIN — Medication 150 GRAM(S): at 02:02

## 2022-07-31 RX ADMIN — SODIUM CHLORIDE 500 MILLILITER(S): 9 INJECTION INTRAMUSCULAR; INTRAVENOUS; SUBCUTANEOUS at 02:03

## 2022-07-31 RX ADMIN — ONDANSETRON 4 MILLIGRAM(S): 8 TABLET, FILM COATED ORAL at 22:12

## 2022-07-31 RX ADMIN — Medication 2 GRAM(S): at 03:01

## 2022-07-31 RX ADMIN — Medication 1 TABLET(S): at 11:52

## 2022-07-31 RX ADMIN — FAMOTIDINE 20 MILLIGRAM(S): 10 INJECTION INTRAVENOUS at 02:02

## 2022-07-31 RX ADMIN — Medication 100 MILLIGRAM(S): at 11:52

## 2022-07-31 RX ADMIN — Medication 2 MILLIGRAM(S): at 22:37

## 2022-07-31 RX ADMIN — AMLODIPINE BESYLATE 5 MILLIGRAM(S): 2.5 TABLET ORAL at 11:52

## 2022-07-31 RX ADMIN — Medication 40 MILLIEQUIVALENT(S): at 03:01

## 2022-07-31 RX ADMIN — Medication 324 MILLIGRAM(S): at 06:05

## 2022-07-31 RX ADMIN — Medication 1 MILLIGRAM(S): at 11:52

## 2022-07-31 RX ADMIN — Medication 3 MILLILITER(S): at 12:02

## 2022-07-31 RX ADMIN — Medication 125 MILLIGRAM(S): at 02:03

## 2022-07-31 RX ADMIN — OLANZAPINE 2.5 MILLIGRAM(S): 15 TABLET, FILM COATED ORAL at 18:22

## 2022-07-31 RX ADMIN — SODIUM CHLORIDE 500 MILLILITER(S): 9 INJECTION INTRAMUSCULAR; INTRAVENOUS; SUBCUTANEOUS at 03:01

## 2022-07-31 RX ADMIN — Medication 3 MILLILITER(S): at 17:03

## 2022-07-31 NOTE — H&P ADULT - NSHPPHYSICALEXAM_GEN_ALL_CORE
Objective:    Vitals:  T(C): 36.5 (07-31-22 @ 08:05), Max: 36.8 (07-31-22 @ 05:26)  HR: 88 (07-31-22 @ 08:05) (88 - 94)  BP: 167/113 (07-31-22 @ 08:05) (143/77 - 167/113)  RR: 16 (07-31-22 @ 08:05) (16 - 18)  SpO2: 98% (07-31-22 @ 08:05) (95% - 100%)    Physical Exam:  General: comfortable, no acute distress, INTOXICATED  HEENT: Atraumatic, no LAD, trachea midline, PERRLA  Cardiovascular: normal s1s2, no murmurs, gallops or fricition rubs  Pulmonary: clear to ausculation Bilaterally, no wheezing , rhonchi  Gastrointestinal: soft non tender non distended, no masses felt, no organomegally  Muscloskeletal: no lower extremity edema, intact bilateral lower extremity pulses  Neurological: CN II-12 intact. No focal weakness  Psychiatrical: normal mood, cooperative  SKIN: no rash, lesions or ulcers

## 2022-07-31 NOTE — PATIENT PROFILE ADULT - WILL THE PATIENT ACCEPT THE PFIZER COVID-19 VACCINE IF ELIGIBLE AND IT IS AVAILABLE?
Problem: Falls - Risk of  Goal: *Absence of Falls  Description: Document Nia Fall Risk and appropriate interventions in the flowsheet every shift. Outcome: Progressing Towards Goal  Note: Fall Risk Interventions:            Medication Interventions: Teach patient to arise slowly          Problem: Altered Thought Process (Adult/Pediatric)  Goal: *STG: Remains safe in hospital  Description: Pt will remain free of SI, intent or plan as well as self-injurious behaviors every shift. Outcome: Progressing Towards Goal  Goal: *STG: Complies with medication therapy  Description: Pt will take medications as prescribed daily. Outcome: Progressing Towards Goal    Pt presents with dull affect, anxious mood, paranoid, suspicious, poor insight, delusional, irritable at times Pt has been selectively sociable on the unit. Pt states, \"The bloods are after me. They are putting poison in my food and drinks! They are always watching me. \" Pt is argumentative with certain staff stating that they are tapering with his personal items and putting poison in his food. Pt denies SI/HI at this time. Pt is medication compliant. Will continue to monitor. Not applicable

## 2022-07-31 NOTE — H&P ADULT - NSHPLABSRESULTS_GEN_ALL_CORE
Labs:                          15.2   2.47  )-----------( 56       ( 31 Jul 2022 00:53 )             45.2     07-31    148<H>  |  109<H>  |  9   ----------------------------<  83  3.0<L>   |  27  |  0.96    Ca    8.3<L>      31 Jul 2022 00:53    TPro  7.4  /  Alb  3.6  /  TBili  1.2  /  DBili  x   /  AST  195<H>  /  ALT  53  /  AlkPhos  180<H>  07-31    LIVER FUNCTIONS - ( 31 Jul 2022 00:53 )  Alb: 3.6 g/dL / Pro: 7.4 gm/dL / ALK PHOS: 180 U/L / ALT: 53 U/L / AST: 195 U/L / GGT: x                 Active Medications  MEDICATIONS  (STANDING):    MEDICATIONS  (PRN):

## 2022-07-31 NOTE — ED ADULT NURSE NOTE - ED STAT RN HANDOFF DETAILS
Assuming patient's care for coverage. Report received from CM Villanueva and patient informed during rounding. Assessment available on KB. Will continue to monitor. repeat trop sent. pt is eating and drinking in bed

## 2022-07-31 NOTE — PATIENT PROFILE ADULT - FALL HARM RISK - RISK INTERVENTIONS

## 2022-07-31 NOTE — ED PROVIDER NOTE - PHYSICAL EXAMINATION
General: Awake, alert and oriented. intoxicated apeparing, not in any distress. Well developed, hydrated and nourished. Appears stated age.  Skin: Skin in warm, dry and intact without rashes or lesions. Appropriate color for ethnicity  HENMT: head normocephalic and atraumatic; bilateral external ears without swelling. no nasal discharge. moist oral mucosa. supple neck, trachea midline  EYES: Conjunctiva clear. nonicteric sclera. EOM intact, Eyelids are normal in appearance without swelling or lesions.  Cardiac: well perfused, s1, s2, rrr  Respiratory: breathing comfortably on room air. expiratory wheezes  Abdominal: nondistended, soft, nontender  MSK: Neck and back are without deformity, visible external skin changes, or signs of trauma. Curvature of the cervical, thoracic, and lumbar spine are within normal limits. no external signs of trauma. no apparent deficits in ROM of any extremity. no leg swelling or tenderness  Neurological: The patient is awake, alert and oriented to person, place, and time with normal speech. CN 2-12 grossly intact. no apparent deficits. Memory is normal and thought process is intact.  Psychiatric: Appropriate mood and affect. Good judgement and insight.

## 2022-07-31 NOTE — H&P ADULT - ASSESSMENT
42 y/o M with PMH asthma, HTN, alcoholic cardyomyopathy presenting for etoh intoxication and chest apin. says chest pain began today, also having some sob. chart review shows admission this year for sob, per reveiew of cardiology documentation patient with known alcoholic cardiomyopathy history of abnormal ekgs, and negative cardiac catheretization last year.    ER Course  VSS afebrile  trops 200 --> 130  alcohol level 500  EKG: biphasic t wave inversions noted  interventions:patient given one round of nebs, solumedrol x 1, aspirin 325, pepcid 20, mag 2     Alcohol intoxication with high risk of DT  folic acid, thiamine MV  IVF  ciwa protocol        Chest pain with biphasic T waves with hx of alcoholic caridomyopathy  patient reports cath last year  plan:  monitor on tele  for stress in am  cardio to be consulted 40 y/o M with PMH asthma, HTN, alcoholic cardyomyopathy with last known EF of 45 percent presenting for etoh intoxication and chest apin. says chest pain began today, also having some sob. chart review shows admission this year for sob, per reveiew of cardiology documentation patient with known alcoholic cardiomyopathy history of abnormal ekgs, and negative cardiac catheretization last year.  trops 200 --> 130  alcohol level 500  EKG: biphasic t wave inversions noted  interventions:patient given one round of nebs, solumedrol x 1, aspirin 325, pepcid 20, mag 2     Alcohol intoxication with high risk of DT  folic acid, thiamine MV  IVF  ciwa protocol    Mechanical fall with ?head strike  check ct head      Chest pain with biphasic T waves with hx of alcoholic cardiomyopathy  Chest likely muscular although EKG patterrn will need further investigation  trops noted flat  plan:  Ct chest   monitor on tele  cardio to be consulted    HTN  add lisinopril and norvasc    Hx of CHF  reviewed chart notes from March hospitalization. reviewed cards notes. Cards advised against beta blockade

## 2022-07-31 NOTE — H&P ADULT - HISTORY OF PRESENT ILLNESS
40 y/o M with PMH asthma, HTN, alcoholic cardyomyopathy presenting for etoh intoxication and chest apin. says chest pain began today, also having some sob. chart review shows admission this year for sob, per reveiew of cardiology documentation patient with known alcoholic cardiomyopathy history of abnormal ekgs, and negative cardiac catheretization last year.    ER Course  VSS afebrile  trops 200 --> 130  alcohol level 500  EKG: biphasic t wave inversions noted  interventions:patient given one round of nebs, solumedrol x 1, aspirin 325, pepcid 20, mag 2      40 y/o M with PMH asthma, HTN, alcoholic cardyomyopathy presenting for etoh intoxication.  History obtained  by patient collateral taken by chart in ER    as per patient: patient reports he came to the ER  because he was intoxicated at the nearby [employer], fell and hit is head. patient denies any LOC during the episode although repors bilateral chest pain .    chart review shows admission this year for sob, per reveiew of cardiology documentation patient with known alcoholic cardiomyopathy history of abnormal ekgs, and negative cardiac catheretization last year.      ER Course  VSS afebrile  trops 200 --> 130  alcohol level 500  EKG: biphasic t wave inversions noted  interventions:patient given one round of nebs, solumedrol x 1, aspirin 325, pepcid 20, mag 2

## 2022-07-31 NOTE — ED ADULT NURSE NOTE - OBJECTIVE STATEMENT
Patient alert and verbally responsive. Confused. Patient admitted that he drank vodka pure today. patient denies pain or any discomfort. Wheezing noted. Patient is calm and cooperative. and ask for sandwich

## 2022-07-31 NOTE — ED PROVIDER NOTE - OBJECTIVE STATEMENT
40 y/o M with PMH asthma, HTN, alcoholic cardyomyopathy presenting for etoh intoxication and chest apin. says chest pain began today, also having some sob. chart review shows admission this year for sob, per reveiew of cardiology documentation patient with known alcoholic cardiomyopahty, history of abnormal ekgs, and negative cardiac catherterization last year.

## 2022-08-01 LAB
ALBUMIN SERPL ELPH-MCNC: 3.2 G/DL — LOW (ref 3.3–5)
ALP SERPL-CCNC: 177 U/L — HIGH (ref 40–120)
ALT FLD-CCNC: 91 U/L — HIGH (ref 12–78)
ANION GAP SERPL CALC-SCNC: 10 MMOL/L — SIGNIFICANT CHANGE UP (ref 5–17)
ANION GAP SERPL CALC-SCNC: 10 MMOL/L — SIGNIFICANT CHANGE UP (ref 5–17)
AST SERPL-CCNC: 400 U/L — HIGH (ref 15–37)
BASOPHILS # BLD AUTO: 0.01 K/UL — SIGNIFICANT CHANGE UP (ref 0–0.2)
BASOPHILS NFR BLD AUTO: 0.2 % — SIGNIFICANT CHANGE UP (ref 0–2)
BILIRUB SERPL-MCNC: 1.3 MG/DL — HIGH (ref 0.2–1.2)
BUN SERPL-MCNC: 15 MG/DL — SIGNIFICANT CHANGE UP (ref 7–23)
BUN SERPL-MCNC: 15 MG/DL — SIGNIFICANT CHANGE UP (ref 7–23)
CALCIUM SERPL-MCNC: 8.7 MG/DL — SIGNIFICANT CHANGE UP (ref 8.5–10.1)
CALCIUM SERPL-MCNC: 8.7 MG/DL — SIGNIFICANT CHANGE UP (ref 8.5–10.1)
CHLORIDE SERPL-SCNC: 107 MMOL/L — SIGNIFICANT CHANGE UP (ref 96–108)
CHLORIDE SERPL-SCNC: 107 MMOL/L — SIGNIFICANT CHANGE UP (ref 96–108)
CK SERPL-CCNC: 548 U/L — HIGH (ref 26–308)
CO2 SERPL-SCNC: 23 MMOL/L — SIGNIFICANT CHANGE UP (ref 22–31)
CO2 SERPL-SCNC: 23 MMOL/L — SIGNIFICANT CHANGE UP (ref 22–31)
CREAT SERPL-MCNC: 0.96 MG/DL — SIGNIFICANT CHANGE UP (ref 0.5–1.3)
CREAT SERPL-MCNC: 0.96 MG/DL — SIGNIFICANT CHANGE UP (ref 0.5–1.3)
EGFR: 103 ML/MIN/1.73M2 — SIGNIFICANT CHANGE UP
EGFR: 103 ML/MIN/1.73M2 — SIGNIFICANT CHANGE UP
EOSINOPHIL # BLD AUTO: 0 K/UL — SIGNIFICANT CHANGE UP (ref 0–0.5)
EOSINOPHIL NFR BLD AUTO: 0 % — SIGNIFICANT CHANGE UP (ref 0–6)
GLUCOSE SERPL-MCNC: 100 MG/DL — HIGH (ref 70–99)
GLUCOSE SERPL-MCNC: 100 MG/DL — HIGH (ref 70–99)
HCT VFR BLD CALC: 40.4 % — SIGNIFICANT CHANGE UP (ref 39–50)
HGB BLD-MCNC: 13.7 G/DL — SIGNIFICANT CHANGE UP (ref 13–17)
IMM GRANULOCYTES NFR BLD AUTO: 0.4 % — SIGNIFICANT CHANGE UP (ref 0–1.5)
LYMPHOCYTES # BLD AUTO: 0.67 K/UL — LOW (ref 1–3.3)
LYMPHOCYTES # BLD AUTO: 14.9 % — SIGNIFICANT CHANGE UP (ref 13–44)
MAGNESIUM SERPL-MCNC: 2.1 MG/DL — SIGNIFICANT CHANGE UP (ref 1.6–2.6)
MCHC RBC-ENTMCNC: 32.1 PG — SIGNIFICANT CHANGE UP (ref 27–34)
MCHC RBC-ENTMCNC: 33.9 G/DL — SIGNIFICANT CHANGE UP (ref 32–36)
MCV RBC AUTO: 94.6 FL — SIGNIFICANT CHANGE UP (ref 80–100)
MONOCYTES # BLD AUTO: 0.36 K/UL — SIGNIFICANT CHANGE UP (ref 0–0.9)
MONOCYTES NFR BLD AUTO: 8 % — SIGNIFICANT CHANGE UP (ref 2–14)
NEUTROPHILS # BLD AUTO: 3.43 K/UL — SIGNIFICANT CHANGE UP (ref 1.8–7.4)
NEUTROPHILS NFR BLD AUTO: 76.5 % — SIGNIFICANT CHANGE UP (ref 43–77)
NRBC # BLD: 0 /100 WBCS — SIGNIFICANT CHANGE UP (ref 0–0)
PHOSPHATE SERPL-MCNC: 3.4 MG/DL — SIGNIFICANT CHANGE UP (ref 2.5–4.5)
PLATELET # BLD AUTO: 45 K/UL — LOW (ref 150–400)
POTASSIUM SERPL-MCNC: 4.2 MMOL/L — SIGNIFICANT CHANGE UP (ref 3.5–5.3)
POTASSIUM SERPL-MCNC: 4.2 MMOL/L — SIGNIFICANT CHANGE UP (ref 3.5–5.3)
POTASSIUM SERPL-SCNC: 4.2 MMOL/L — SIGNIFICANT CHANGE UP (ref 3.5–5.3)
POTASSIUM SERPL-SCNC: 4.2 MMOL/L — SIGNIFICANT CHANGE UP (ref 3.5–5.3)
PROT SERPL-MCNC: 7.2 GM/DL — SIGNIFICANT CHANGE UP (ref 6–8.3)
RBC # BLD: 4.27 M/UL — SIGNIFICANT CHANGE UP (ref 4.2–5.8)
RBC # FLD: 14.5 % — SIGNIFICANT CHANGE UP (ref 10.3–14.5)
SODIUM SERPL-SCNC: 140 MMOL/L — SIGNIFICANT CHANGE UP (ref 135–145)
SODIUM SERPL-SCNC: 140 MMOL/L — SIGNIFICANT CHANGE UP (ref 135–145)
TROPONIN I, HIGH SENSITIVITY RESULT: 47.3 NG/L — SIGNIFICANT CHANGE UP
WBC # BLD: 4.49 K/UL — SIGNIFICANT CHANGE UP (ref 3.8–10.5)
WBC # FLD AUTO: 4.49 K/UL — SIGNIFICANT CHANGE UP (ref 3.8–10.5)

## 2022-08-01 PROCEDURE — 99223 1ST HOSP IP/OBS HIGH 75: CPT

## 2022-08-01 PROCEDURE — 99233 SBSQ HOSP IP/OBS HIGH 50: CPT

## 2022-08-01 PROCEDURE — 74177 CT ABD & PELVIS W/CONTRAST: CPT | Mod: 26

## 2022-08-01 RX ADMIN — Medication 100 MILLIGRAM(S): at 11:02

## 2022-08-01 RX ADMIN — ONDANSETRON 4 MILLIGRAM(S): 8 TABLET, FILM COATED ORAL at 17:37

## 2022-08-01 RX ADMIN — Medication 3 MILLILITER(S): at 06:03

## 2022-08-01 RX ADMIN — Medication 1 MILLIGRAM(S): at 11:02

## 2022-08-01 RX ADMIN — Medication 1 TABLET(S): at 11:02

## 2022-08-01 RX ADMIN — Medication 2 MILLIGRAM(S): at 10:49

## 2022-08-01 RX ADMIN — Medication 3 MILLIGRAM(S): at 21:23

## 2022-08-01 RX ADMIN — Medication 3 MILLILITER(S): at 12:02

## 2022-08-01 RX ADMIN — AMLODIPINE BESYLATE 5 MILLIGRAM(S): 2.5 TABLET ORAL at 05:18

## 2022-08-01 RX ADMIN — BUDESONIDE AND FORMOTEROL FUMARATE DIHYDRATE 2 PUFF(S): 160; 4.5 AEROSOL RESPIRATORY (INHALATION) at 06:42

## 2022-08-01 RX ADMIN — Medication 2 MILLIGRAM(S): at 01:14

## 2022-08-01 RX ADMIN — Medication 2 MILLIGRAM(S): at 13:32

## 2022-08-01 RX ADMIN — Medication 3 MILLILITER(S): at 17:05

## 2022-08-01 RX ADMIN — BUDESONIDE AND FORMOTEROL FUMARATE DIHYDRATE 2 PUFF(S): 160; 4.5 AEROSOL RESPIRATORY (INHALATION) at 17:11

## 2022-08-01 RX ADMIN — LISINOPRIL 20 MILLIGRAM(S): 2.5 TABLET ORAL at 05:18

## 2022-08-01 RX ADMIN — Medication 3 MILLILITER(S): at 00:09

## 2022-08-01 NOTE — CONSULT NOTE ADULT - ASSESSMENT
atypical chest pain  mild troponin elevation  etoh abuse  thrombocytopenia and elevated transaminases    chest pain does not appear to be cardiac in nature  suspect elevated troponin due to recent intox, trauma, and possible alcoholic hepatitis given increasing transaminases.  furthermore, he is not a candidate for antiplatelet therapy due to thrombocytopenia.      -hold acetaminophen  -check CK  -check TTE  -consider betablocker, however given reactive airway disease would prefer carvedilol; BID dosing would reduce medication compliance, which is already poor. therefor would continue with amlodipine for BP control for now.

## 2022-08-01 NOTE — PROGRESS NOTE ADULT - ASSESSMENT
40 y/o M with PMH asthma, HTN, alcoholic cardyomyopathy with last known EF of 45 percent presenting for etoh intoxication and chest apin. says chest pain began today, also having some sob. chart review shows admission this year for sob, per reveiew of cardiology documentation patient with known alcoholic cardiomyopathy history of abnormal ekgs, and negative cardiac catheretization last year.  trops 200 --> 130  alcohol level 500  EKG: biphasic t wave inversions noted  interventions:patient given one round of nebs, solumedrol x 1, aspirin 325, pepcid 20, mag 2     Alcohol intoxication with high risk of DT  folic acid, thiamine MV  IVF  ciwa protocol    Mechanical fall with ?head strike  check ct head      Chest pain with biphasic T waves with hx of alcoholic cardiomyopathy  Chest likely muscular although EKG patterrn will need further investigation  trops noted flat  plan:  Ct chest   monitor on tele  cardio to be consulted    HTN  add lisinopril and norvasc    Hx of CHF  reviewed chart notes from March hospitalization. reviewed cards notes. Cards advised against beta blockade

## 2022-08-01 NOTE — CONSULT NOTE ADULT - SUBJECTIVE AND OBJECTIVE BOX
Cardiology Initial Consult    [facility] - Cardiology at [place]  [address] [place] [address], [place] [address]  Office: [phone]  Fax: [phone]    CHIEF COMPLAINT:  chest pain    HISTORY OF PRESENT ILLNESS:  Primary cardiologist: Dr. Figueroa    41M HTN, etoh NICM, asthma who presented with intox and head trauma with complaint of new chest pain, described as pulsatile  pressure, midepigastric/lower sternal. No associated SOB, dizziness, or LOC. Notes improved after drinking some water.    Currently reports diffuse myalgias and joint pain, abdominal pain without vomiting or diarrhea. Poor appetite. also with worsening eczema    Allergies    No Known Drug Allergies  shellfish (Angioedema)    MEDICATIONS:  amLODIPine   Tablet 5 milliGRAM(s) Oral daily  lisinopril 20 milliGRAM(s) Oral daily  albuterol/ipratropium for Nebulization 3 milliLiter(s) Nebulizer every 6 hours  budesonide 160 MICROgram(s)/formoterol 4.5 MICROgram(s) Inhaler 2 Puff(s) Inhalation two times a day  acetaminophen     Tablet .. 650 milliGRAM(s) Oral every 6 hours PRN  LORazepam   Injectable 2 milliGRAM(s) IV Push every 2 hours PRN  melatonin 3 milliGRAM(s) Oral at bedtime PRN  ondansetron Injectable 4 milliGRAM(s) IV Push every 8 hours PRN  aluminum hydroxide/magnesium hydroxide/simethicone Suspension 30 milliLiter(s) Oral every 4 hours PRN  folic acid 1 milliGRAM(s) Oral daily  multivitamin 1 Tablet(s) Oral daily  thiamine 100 milliGRAM(s) Oral daily    PAST MEDICAL & SURGICAL HISTORY:  HTN (hypertension)  Asthma    FAMILY HISTORY:    SOCIAL HISTORY:    [ ] Non-smoker  [ ] Smoker  [x] Alcohol    Review of Systems:  Constitutional: [ ] Fever [ ] Chills [ x] Fatigue [ ] Weight change   HEENT: [ ] Blurred vision [ ] Eye pain [- ] Headache [ ] Runny nose [ ] Sore throat   Respiratory: [- ] Cough [ ] Wheezing [ ] Shortness of breath  Cardiovascular: [ x] Chest Pain [ x] Palpitations [ ] GARCIA [ ] PND [ ] Orthopnea  Gastrointestinal: [ x] Abdominal Pain [ ] Diarrhea [ ] Constipation [ ] Hemorrhoids [ ] Nausea [ ] Vomiting  Genitourinary: [ ] Nocturia [- ] Dysuria [ ] Incontinence  Extremities: [ x] Swelling [ ] Joint Pain  Neurologic: [- ] Focal deficit [ ] Paresthesias [ ] Syncope  Skin: [x ] Rash [ ] Ecchymoses [ ] Wounds [ ] Lesions  Psychiatry: [ ] Depression [ ] Suicidal/Homicidal ideation [ ] Anxiety [ ] Sleep disturbances  [ x] 10 point review of systems is otherwise negative except as mentioned above            [ ]Unable to obtain    PHYSICAL EXAM:  T(C): 36.4 (08-01-22 @ 05:03), Max: 37.1 (07-31-22 @ 20:45)  HR: 72 (08-01-22 @ 06:05) (69 - 99)  BP: 135/80 (08-01-22 @ 05:03) (118/81 - 150/85)  RR: 18 (08-01-22 @ 05:03) (12 - 18)  SpO2: 94% (08-01-22 @ 06:05) (93% - 99%)  Wt(kg): --  I&O's Summary      Appearance: uncomfortable  HEENT:   mmm  Cardiovascular: Normal S1 S2, no elevated JVP, no murmurs, no edema  Respiratory: mild wheeze  Psychiatry: A & O x 3, Mood & affect appropriate  Gastrointestinal:  soft mild diffuse ttp without reboudn  Skin: +eczema	  Extremities: Normal range of motion, no clubbing, cyanosis or edema  Vascular: Peripheral pulses palpable bilaterally    LABS:	 	  CBC Full  -  ( 31 Jul 2022 10:15 )  WBC Count : 2.98 K/uL  Hemoglobin : 14.6 g/dL  Hematocrit : 42.2 %  Platelet Count - Automated : 59 K/uL    08-01  140  |  107  |  15  ----------------------------<  100<H>  4.2   |  23  |  0.96    07-31  141  |  105  |  11  ----------------------------<  189<H>  3.7   |  24  |  0.95    Ca    8.7      01 Aug 2022 07:10  Ca    8.2<L>      31 Jul 2022 10:05  Phos  3.4     08-01  Phos  3.5     07-31  Mg     2.1     08-01  Mg     2.2     07-31    TPro  7.2  /  Alb  3.2<L>  /  TBili  1.3<H>  /  DBili  x   /  AST  400<H>  /  ALT  91<H>  /  AlkPhos  177<H>  08-01  TPro  7.6  /  Alb  3.6  /  TBili  1.2  /  DBili  0.4<H>  /  AST  351<H>  /  ALT  72  /  AlkPhos  199<H>  07-31    proBNP: Serum Pro-Brain Natriuretic Peptide: 13 pg/mL (07-31 @ 00:53)    CARDIAC MARKERS:  Troponin I, High Sensitivity Result: 162.4 ng/L (07-31-22 @ 03:50)  Troponin I, High Sensitivity Result: 150.6 ng/L (07-31-22 @ 00:53)    TELEMETRY: 	    ECG:  	SR, LVH, biphasic twave anterior precordial leads  RADIOLOGY:  OTHER: 	    PREVIOUS DIAGNOSTIC TESTING:    [x] Echocardiogram: < from: TTE Echo Complete w/o Contrast w/ Doppler (03.13.22 @ 08:47) >  Summary:   1. Left ventricular ejection fraction, by visual estimation, is 45 to   50%.   2. Mildly decreased global left ventricular systolic function.   3. Elevated mean left atrial pressure.   4. Spectral Doppler shows pseudonormal pattern of left ventricular   myocardial filling (Grade II diastolic dysfunction).   5. Moderately enlarged left atrium.   6. Mild mitral valve regurgitation.   7. Mild pulmonic valve regurgitation.   8. Dilatation of the aortic root.    < end of copied text >    [ ] Catheterization:  [ ] Stress Test:

## 2022-08-02 LAB
GLUCOSE BLDC GLUCOMTR-MCNC: 154 MG/DL — HIGH (ref 70–99)
HCT VFR BLD CALC: 38.3 % — LOW (ref 39–50)
HGB BLD-MCNC: 13.1 G/DL — SIGNIFICANT CHANGE UP (ref 13–17)
MCHC RBC-ENTMCNC: 31.3 PG — SIGNIFICANT CHANGE UP (ref 27–34)
MCHC RBC-ENTMCNC: 34.2 G/DL — SIGNIFICANT CHANGE UP (ref 32–36)
MCV RBC AUTO: 91.4 FL — SIGNIFICANT CHANGE UP (ref 80–100)
NRBC # BLD: 0 /100 WBCS — SIGNIFICANT CHANGE UP (ref 0–0)
PLATELET # BLD AUTO: 39 K/UL — LOW (ref 150–400)
RBC # BLD: 4.19 M/UL — LOW (ref 4.2–5.8)
RBC # FLD: 14 % — SIGNIFICANT CHANGE UP (ref 10.3–14.5)
WBC # BLD: 4.26 K/UL — SIGNIFICANT CHANGE UP (ref 3.8–10.5)
WBC # FLD AUTO: 4.26 K/UL — SIGNIFICANT CHANGE UP (ref 3.8–10.5)

## 2022-08-02 PROCEDURE — 99233 SBSQ HOSP IP/OBS HIGH 50: CPT

## 2022-08-02 PROCEDURE — 93306 TTE W/DOPPLER COMPLETE: CPT | Mod: 26

## 2022-08-02 PROCEDURE — 99232 SBSQ HOSP IP/OBS MODERATE 35: CPT

## 2022-08-02 RX ORDER — IPRATROPIUM/ALBUTEROL SULFATE 18-103MCG
3 AEROSOL WITH ADAPTER (GRAM) INHALATION EVERY 6 HOURS
Refills: 0 | Status: DISCONTINUED | OUTPATIENT
Start: 2022-08-02 | End: 2022-08-05

## 2022-08-02 RX ORDER — FLUOCINONIDE/EMOLLIENT BASE 0.05 %
1 CREAM (GRAM) TOPICAL DAILY
Refills: 0 | Status: DISCONTINUED | OUTPATIENT
Start: 2022-08-02 | End: 2022-08-02

## 2022-08-02 RX ADMIN — Medication 1 APPLICATION(S): at 11:09

## 2022-08-02 RX ADMIN — Medication 650 MILLIGRAM(S): at 22:49

## 2022-08-02 RX ADMIN — BUDESONIDE AND FORMOTEROL FUMARATE DIHYDRATE 2 PUFF(S): 160; 4.5 AEROSOL RESPIRATORY (INHALATION) at 17:21

## 2022-08-02 RX ADMIN — LISINOPRIL 20 MILLIGRAM(S): 2.5 TABLET ORAL at 05:10

## 2022-08-02 RX ADMIN — Medication 650 MILLIGRAM(S): at 23:26

## 2022-08-02 RX ADMIN — Medication 3 MILLILITER(S): at 05:41

## 2022-08-02 RX ADMIN — Medication 3 MILLILITER(S): at 00:11

## 2022-08-02 RX ADMIN — BUDESONIDE AND FORMOTEROL FUMARATE DIHYDRATE 2 PUFF(S): 160; 4.5 AEROSOL RESPIRATORY (INHALATION) at 05:12

## 2022-08-02 RX ADMIN — Medication 1 TABLET(S): at 11:09

## 2022-08-02 RX ADMIN — Medication 1 MILLIGRAM(S): at 11:09

## 2022-08-02 RX ADMIN — Medication 100 MILLIGRAM(S): at 11:09

## 2022-08-02 RX ADMIN — AMLODIPINE BESYLATE 5 MILLIGRAM(S): 2.5 TABLET ORAL at 05:11

## 2022-08-02 NOTE — PROGRESS NOTE ADULT - ASSESSMENT
42 y/o M with PMH asthma, HTN, alcoholic cardiomyopathy presenting for etoh intoxication and chest pain    atypical chest pain  mild troponin elevation  etoh abuse  thrombocytopenia and elevated transaminases    chest pain does not appear to be cardiac in nature  suspect elevated troponin due to recent intox, trauma, and possible alcoholic hepatitis given increasing transaminases.  furthermore, he is not a candidate for antiplatelet therapy due to thrombocytopenia.      -Avoid acetaminophen  -  -check TTE  -consider betablocker, however given reactive airway disease would prefer carvedilol; BID dosing would reduce medication compliance, which is already poor. therefor would continue with amlodipine for BP control for now.   40 y/o M with PMH asthma, HTN, alcoholic cardiomyopathy presenting for etoh intoxication and chest pain    atypical chest pain  mild troponin elevation  etoh abuse  thrombocytopenia and elevated transaminases    chest pain does not appear to be cardiac in nature  suspect elevated troponin due to recent intox, trauma, and possible alcoholic hepatitis given increasing transaminases.  furthermore, he is not a candidate for antiplatelet therapy due to thrombocytopenia.      -Avoid acetaminophen  -  -check TTE  -consider betablocker, however given reactive airway disease would prefer carvedilol; BID dosing would reduce medication compliance, which is already poor. therefor would continue with amlodipine for BP control for now.  -Signing off now, please reconsult as needed

## 2022-08-02 NOTE — PROGRESS NOTE ADULT - ASSESSMENT
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 says chest pain began today, also having some sob. chart review shows admission this year for sob, per reveiew of cardiology documentation patient with known alcoholic cardiomyopathy history of abnormal ekgs, and negative cardiac catheretization last year.  trops 200 --> 130  alcohol level 500  EKG: biphasic t wave inversions noted  interventions:patient given one round of nebs, solumedrol x 1, aspirin 325, pepcid 20, mag 2     Alcohol intoxication with high risk of DT  folic acid, thiamine MV  IVF  ciwa protocol: last day today    Mechanical fall with ?head strike  ct head negative      Chest pain with biphasic T waves with hx of alcoholic cardiomyopathy  Chest likely muscular   With EKG; persued echo: improved  trops noted flat  cardio consulted: signed off  plan:    HTN  add lisinopril and norvasc    Hx of CHF  reviewed chart notes from March hospitalization. reviewed cards notes. Cards advised against beta blockade. echo improved. although with asthma will defer BB

## 2022-08-02 NOTE — PROGRESS NOTE ADULT - NS ATTEND AMEND GEN_ALL_CORE FT
TTE with recovered EF and without LV dilation  troponin elevated in setting of myonecrosis/elevated CK    no further cardiac testing indicated at this time

## 2022-08-03 LAB
ALBUMIN SERPL ELPH-MCNC: 3.1 G/DL — LOW (ref 3.3–5)
ALP SERPL-CCNC: 171 U/L — HIGH (ref 40–120)
ALT FLD-CCNC: 64 U/L — SIGNIFICANT CHANGE UP (ref 12–78)
AST SERPL-CCNC: 146 U/L — HIGH (ref 15–37)
BILIRUB DIRECT SERPL-MCNC: 0.5 MG/DL — HIGH (ref 0–0.3)
BILIRUB INDIRECT FLD-MCNC: 1.3 MG/DL — HIGH (ref 0.2–1)
BILIRUB SERPL-MCNC: 1.8 MG/DL — HIGH (ref 0.2–1.2)
GLUCOSE BLDC GLUCOMTR-MCNC: 101 MG/DL — HIGH (ref 70–99)
GLUCOSE BLDC GLUCOMTR-MCNC: 112 MG/DL — HIGH (ref 70–99)
PROT SERPL-MCNC: 6.7 GM/DL — SIGNIFICANT CHANGE UP (ref 6–8.3)

## 2022-08-03 PROCEDURE — 99233 SBSQ HOSP IP/OBS HIGH 50: CPT

## 2022-08-03 PROCEDURE — 93971 EXTREMITY STUDY: CPT | Mod: 26

## 2022-08-03 RX ORDER — ONDANSETRON 8 MG/1
4 TABLET, FILM COATED ORAL EVERY 8 HOURS
Refills: 0 | Status: DISCONTINUED | OUTPATIENT
Start: 2022-08-03 | End: 2022-08-05

## 2022-08-03 RX ORDER — CYCLOBENZAPRINE HYDROCHLORIDE 10 MG/1
5 TABLET, FILM COATED ORAL THREE TIMES A DAY
Refills: 0 | Status: DISCONTINUED | OUTPATIENT
Start: 2022-08-03 | End: 2022-08-05

## 2022-08-03 RX ADMIN — Medication 2 MILLIGRAM(S): at 13:31

## 2022-08-03 RX ADMIN — BUDESONIDE AND FORMOTEROL FUMARATE DIHYDRATE 2 PUFF(S): 160; 4.5 AEROSOL RESPIRATORY (INHALATION) at 05:39

## 2022-08-03 RX ADMIN — Medication 1 APPLICATION(S): at 14:21

## 2022-08-03 RX ADMIN — ONDANSETRON 4 MILLIGRAM(S): 8 TABLET, FILM COATED ORAL at 09:48

## 2022-08-03 RX ADMIN — Medication 650 MILLIGRAM(S): at 23:15

## 2022-08-03 RX ADMIN — Medication 650 MILLIGRAM(S): at 08:31

## 2022-08-03 RX ADMIN — BUDESONIDE AND FORMOTEROL FUMARATE DIHYDRATE 2 PUFF(S): 160; 4.5 AEROSOL RESPIRATORY (INHALATION) at 17:46

## 2022-08-03 RX ADMIN — Medication 1 TABLET(S): at 14:22

## 2022-08-03 RX ADMIN — Medication 1 MILLIGRAM(S): at 14:22

## 2022-08-03 RX ADMIN — LISINOPRIL 20 MILLIGRAM(S): 2.5 TABLET ORAL at 05:39

## 2022-08-03 RX ADMIN — Medication 650 MILLIGRAM(S): at 09:46

## 2022-08-03 RX ADMIN — AMLODIPINE BESYLATE 5 MILLIGRAM(S): 2.5 TABLET ORAL at 05:39

## 2022-08-03 RX ADMIN — Medication 100 MILLIGRAM(S): at 14:21

## 2022-08-03 RX ADMIN — Medication 650 MILLIGRAM(S): at 22:56

## 2022-08-03 NOTE — PROGRESS NOTE ADULT - ASSESSMENT
40 y/o M with PMH asthma, HTN, alcoholic cardyomyopathy with last known EF of 45 percent presenting for etoh intoxication and chest apin. says chest pain began today, also having some sob. chart review shows admission this year for sob, per reveiew of cardiology documentation patient with known alcoholic cardiomyopathy history of abnormal ekgs, and negative cardiac catheretization last year.  trops 200 --> 130  alcohol level 500  EKG: biphasic t wave inversions noted  interventions:patient given one round of nebs, solumedrol x 1, aspirin 325, pepcid 20, mag 2     Alcohol intoxication with high risk of DT  folic acid, thiamine MV  IVF  [place] ATIVAN per CIWA   Mechanical fall with ?head strike  ct head negative      Chest pain with biphasic T waves with hx of alcoholic cardiomyopathy  Chest likely muscular   With EKG; persued echo: improved  trops noted flat  cardio consulted: signed off      HTN  add lisinopril and norvasc    Hx of CHF  reviewed chart notes from March hospitalization. reviewed cards notes. Cards advised against beta blockade. echo improved. although with asthma will defer BB      Nausea:  - with diarrhea:  - Exam benign   - Zofran PRN  - Likely ETOH gastritis    LE pain:  - Neg doppler  - Trial of flexeril    Thrombocytopenia:  - 2/2 ETOH    DVT ppx;  SCDs  Low plt

## 2022-08-04 LAB
ALBUMIN SERPL ELPH-MCNC: 3 G/DL — LOW (ref 3.3–5)
ALP SERPL-CCNC: 146 U/L — HIGH (ref 40–120)
ALT FLD-CCNC: 60 U/L — SIGNIFICANT CHANGE UP (ref 12–78)
ANION GAP SERPL CALC-SCNC: 7 MMOL/L — SIGNIFICANT CHANGE UP (ref 5–17)
ANION GAP SERPL CALC-SCNC: 9 MMOL/L — SIGNIFICANT CHANGE UP (ref 5–17)
AST SERPL-CCNC: 169 U/L — HIGH (ref 15–37)
BILIRUB SERPL-MCNC: 1.2 MG/DL — SIGNIFICANT CHANGE UP (ref 0.2–1.2)
BUN SERPL-MCNC: 13 MG/DL — SIGNIFICANT CHANGE UP (ref 7–23)
BUN SERPL-MCNC: 15 MG/DL — SIGNIFICANT CHANGE UP (ref 7–23)
CALCIUM SERPL-MCNC: 9 MG/DL — SIGNIFICANT CHANGE UP (ref 8.5–10.1)
CALCIUM SERPL-MCNC: 9 MG/DL — SIGNIFICANT CHANGE UP (ref 8.5–10.1)
CHLORIDE SERPL-SCNC: 97 MMOL/L — SIGNIFICANT CHANGE UP (ref 96–108)
CHLORIDE SERPL-SCNC: 99 MMOL/L — SIGNIFICANT CHANGE UP (ref 96–108)
CO2 SERPL-SCNC: 27 MMOL/L — SIGNIFICANT CHANGE UP (ref 22–31)
CO2 SERPL-SCNC: 30 MMOL/L — SIGNIFICANT CHANGE UP (ref 22–31)
CREAT SERPL-MCNC: 0.76 MG/DL — SIGNIFICANT CHANGE UP (ref 0.5–1.3)
CREAT SERPL-MCNC: 0.87 MG/DL — SIGNIFICANT CHANGE UP (ref 0.5–1.3)
EGFR: 113 ML/MIN/1.73M2 — SIGNIFICANT CHANGE UP
EGFR: 117 ML/MIN/1.73M2 — SIGNIFICANT CHANGE UP
GLUCOSE SERPL-MCNC: 103 MG/DL — HIGH (ref 70–99)
GLUCOSE SERPL-MCNC: 76 MG/DL — SIGNIFICANT CHANGE UP (ref 70–99)
HCT VFR BLD CALC: 42.3 % — SIGNIFICANT CHANGE UP (ref 39–50)
HGB BLD-MCNC: 14.5 G/DL — SIGNIFICANT CHANGE UP (ref 13–17)
MAGNESIUM SERPL-MCNC: 1.5 MG/DL — LOW (ref 1.6–2.6)
MCHC RBC-ENTMCNC: 31.3 PG — SIGNIFICANT CHANGE UP (ref 27–34)
MCHC RBC-ENTMCNC: 34.3 G/DL — SIGNIFICANT CHANGE UP (ref 32–36)
MCV RBC AUTO: 91.4 FL — SIGNIFICANT CHANGE UP (ref 80–100)
NRBC # BLD: 0 /100 WBCS — SIGNIFICANT CHANGE UP (ref 0–0)
PHOSPHATE SERPL-MCNC: 3.4 MG/DL — SIGNIFICANT CHANGE UP (ref 2.5–4.5)
PLATELET # BLD AUTO: 49 K/UL — LOW (ref 150–400)
POTASSIUM SERPL-MCNC: 2.5 MMOL/L — CRITICAL LOW (ref 3.5–5.3)
POTASSIUM SERPL-MCNC: 3.1 MMOL/L — LOW (ref 3.5–5.3)
POTASSIUM SERPL-SCNC: 2.5 MMOL/L — CRITICAL LOW (ref 3.5–5.3)
POTASSIUM SERPL-SCNC: 3.1 MMOL/L — LOW (ref 3.5–5.3)
PROT SERPL-MCNC: 6.7 GM/DL — SIGNIFICANT CHANGE UP (ref 6–8.3)
RBC # BLD: 4.63 M/UL — SIGNIFICANT CHANGE UP (ref 4.2–5.8)
RBC # FLD: 13.7 % — SIGNIFICANT CHANGE UP (ref 10.3–14.5)
SODIUM SERPL-SCNC: 133 MMOL/L — LOW (ref 135–145)
SODIUM SERPL-SCNC: 136 MMOL/L — SIGNIFICANT CHANGE UP (ref 135–145)
WBC # BLD: 6.58 K/UL — SIGNIFICANT CHANGE UP (ref 3.8–10.5)
WBC # FLD AUTO: 6.58 K/UL — SIGNIFICANT CHANGE UP (ref 3.8–10.5)

## 2022-08-04 PROCEDURE — 99233 SBSQ HOSP IP/OBS HIGH 50: CPT

## 2022-08-04 RX ORDER — POTASSIUM CHLORIDE 20 MEQ
10 PACKET (EA) ORAL
Refills: 0 | Status: COMPLETED | OUTPATIENT
Start: 2022-08-04 | End: 2022-08-04

## 2022-08-04 RX ORDER — POTASSIUM CHLORIDE 20 MEQ
40 PACKET (EA) ORAL EVERY 4 HOURS
Refills: 0 | Status: COMPLETED | OUTPATIENT
Start: 2022-08-04 | End: 2022-08-04

## 2022-08-04 RX ORDER — MAGNESIUM SULFATE 500 MG/ML
2 VIAL (ML) INJECTION ONCE
Refills: 0 | Status: COMPLETED | OUTPATIENT
Start: 2022-08-04 | End: 2022-08-04

## 2022-08-04 RX ORDER — POTASSIUM CHLORIDE 20 MEQ
40 PACKET (EA) ORAL ONCE
Refills: 0 | Status: COMPLETED | OUTPATIENT
Start: 2022-08-04 | End: 2022-08-04

## 2022-08-04 RX ADMIN — Medication 100 MILLIEQUIVALENT(S): at 22:29

## 2022-08-04 RX ADMIN — Medication 1 APPLICATION(S): at 11:34

## 2022-08-04 RX ADMIN — Medication 100 MILLIEQUIVALENT(S): at 21:22

## 2022-08-04 RX ADMIN — Medication 40 MILLIEQUIVALENT(S): at 13:59

## 2022-08-04 RX ADMIN — AMLODIPINE BESYLATE 5 MILLIGRAM(S): 2.5 TABLET ORAL at 05:14

## 2022-08-04 RX ADMIN — Medication 3 MILLIGRAM(S): at 23:42

## 2022-08-04 RX ADMIN — BUDESONIDE AND FORMOTEROL FUMARATE DIHYDRATE 2 PUFF(S): 160; 4.5 AEROSOL RESPIRATORY (INHALATION) at 05:14

## 2022-08-04 RX ADMIN — LISINOPRIL 20 MILLIGRAM(S): 2.5 TABLET ORAL at 05:14

## 2022-08-04 RX ADMIN — Medication 1 TABLET(S): at 11:33

## 2022-08-04 RX ADMIN — Medication 1 MILLIGRAM(S): at 11:33

## 2022-08-04 RX ADMIN — Medication 40 MILLIEQUIVALENT(S): at 22:30

## 2022-08-04 RX ADMIN — Medication 100 MILLIGRAM(S): at 11:33

## 2022-08-04 RX ADMIN — Medication 100 MILLIEQUIVALENT(S): at 11:33

## 2022-08-04 RX ADMIN — Medication 25 GRAM(S): at 10:28

## 2022-08-04 RX ADMIN — Medication 3 MILLIGRAM(S): at 00:14

## 2022-08-04 RX ADMIN — Medication 100 MILLIEQUIVALENT(S): at 19:43

## 2022-08-04 RX ADMIN — Medication 40 MILLIEQUIVALENT(S): at 09:36

## 2022-08-04 NOTE — PROGRESS NOTE ADULT - ASSESSMENT
40 y/o M with PMH asthma, HTN, alcoholic cardyomyopathy with last known EF of 45 percent presenting for etoh intoxication and chest apin. says chest pain began today, also having some sob. chart review shows admission this year for sob, per reveiew of cardiology documentation patient with known alcoholic cardiomyopathy history of abnormal ekgs, and negative cardiac catheretization last year.  trops 200 --> 130  alcohol level 500  EKG: biphasic t wave inversions noted  interventions:patient given one round of nebs, solumedrol x 1, aspirin 325, pepcid 20, mag 2     Alcohol intoxication with high risk of DT  folic acid, thiamine MV  IVF  [place] ATIVAN per CIWA   Mechanical fall with ?head strike  ct head negative      Chest pain with biphasic T waves with hx of alcoholic cardiomyopathy  Chest likely muscular   With EKG; persued echo: improved  trops noted flat  cardio consulted: signed off      HTN  Continue  lisinopril and norvasc    Hx of CHF  reviewed chart notes from March hospitalization. reviewed cards notes. Cards advised against beta blockade. echo improved. although with asthma will defer BB    Nausea:  - with diarrhea  - Exam benign   - Zofran PRN  - Likely ETOH gastritis  - Improved today    LE pain:  - Neg doppler  - Trial of flexeril    Thrombocytopenia:  - 2/2 ETOH    Hypokalemia and hypomagnesemia:  - Being replaced     DVT ppx;  SCDs  Low plt

## 2022-08-04 NOTE — PROGRESS NOTE ADULT - SUBJECTIVE AND OBJECTIVE BOX
Patient is a 41y old  Male who presents with a chief complaint of Alcohol intoxication and Chest pain (02 Aug 2022 12:17)    INTERVAL HPI/OVERNIGHT EVENTS:  Pt was seen and examined, no acute events.    MEDICATIONS  (STANDING):  amLODIPine   Tablet 5 milliGRAM(s) Oral daily  betamethasone valerate 0.1% Cream 1 Application(s) Topical daily  budesonide 160 MICROgram(s)/formoterol 4.5 MICROgram(s) Inhaler 2 Puff(s) Inhalation two times a day  folic acid 1 milliGRAM(s) Oral daily  lisinopril 20 milliGRAM(s) Oral daily  multivitamin 1 Tablet(s) Oral daily  thiamine 100 milliGRAM(s) Oral daily    MEDICATIONS  (PRN):  acetaminophen     Tablet .. 650 milliGRAM(s) Oral every 6 hours PRN Temp greater or equal to 38C (100.4F), Mild Pain (1 - 3)  albuterol/ipratropium for Nebulization 3 milliLiter(s) Nebulizer every 6 hours PRN Shortness of Breath and/or Wheezing  aluminum hydroxide/magnesium hydroxide/simethicone Suspension 30 milliLiter(s) Oral every 4 hours PRN Dyspepsia  cyclobenzaprine 5 milliGRAM(s) Oral three times a day PRN Muscle Spasm  LORazepam   Injectable 2 milliGRAM(s) IV Push every 2 hours PRN CIWA-Ar score increase by 2 points and a total score of 7 or less  melatonin 3 milliGRAM(s) Oral at bedtime PRN Insomnia  ondansetron   Disintegrating Tablet 4 milliGRAM(s) Oral every 8 hours PRN Nausea and/or Vomiting      Allergies  No Known Drug Allergies  shellfish (Angioedema)      Vital Signs Last 24 Hrs  T(C): 36.6 (03 Aug 2022 16:20), Max: 37.2 (03 Aug 2022 00:23)  T(F): 97.8 (03 Aug 2022 16:20), Max: 99 (03 Aug 2022 00:23)  HR: 84 (03 Aug 2022 16:20) (74 - 84)  BP: 150/93 (03 Aug 2022 16:20) (145/94 - 154/95)  BP(mean): --  RR: 18 (03 Aug 2022 16:20) (18 - 20)  SpO2: 96% (03 Aug 2022 16:20) (96% - 97%)    Parameters below as of 03 Aug 2022 16:20  Patient On (Oxygen Delivery Method): room air      PHYSICAL EXAM:  General: comfortable, no acute distress, well nourished  HEENT: Atraumatic, no LAD, trachea midline, PERRLA  Cardiovascular: normal s1s2, no murmurs, gallops or friction rubs  Pulmonary: clear to ausculation Bilaterally, no wheezing , rhonchi  Gastrointestinal: soft non tender non distended, no masses felt, no organomegally  Muscloskeletal: no lower extremity edema, intact bilateral lower extremity pulses  Neurological: CN II-12 intact. No focal weakness  Psychiatrical: normal mood, cooperative  SKIN: no rash, lesions or ulcers    LABS:                        13.1   4.26  )-----------( 39       ( 02 Aug 2022 15:05 )             38.3         TPro  6.7  /  Alb  3.1<L>  /  TBili  1.8<H>  /  DBili  0.5<H>  /  AST  146<H>  /  ALT  64  /  AlkPhos  171<H>  08-03        CAPILLARY BLOOD GLUCOSE      POCT Blood Glucose.: 112 mg/dL (03 Aug 2022 11:38)  POCT Blood Glucose.: 101 mg/dL (03 Aug 2022 08:06)      RADIOLOGY & ADDITIONAL TESTS:    Imaging Personally Reviewed:  [ ] YES  [ ] NO    Consultant(s) Notes Reviewed:  [ ] YES  [ ] NO    Care Discussed with Consultants/Other Providers [ ] YES  [ ] NO
Patient is a 41y old  Male who presents with  Alcohol intoxication and Chest pain    PAST MEDICAL & SURGICAL HISTORY:  HTN (hypertension)     cardiomyopathy    Asthma    INTERVAL HISTORY: c/o nausea after trying to have breakfast, Mild abd discomfort, denies any chest pain or sob  	  MEDICATIONS:  MEDICATIONS  (STANDING):  amLODIPine   Tablet 5 milliGRAM(s) Oral daily  betamethasone valerate 0.1% Cream 1 Application(s) Topical daily  budesonide 160 MICROgram(s)/formoterol 4.5 MICROgram(s) Inhaler 2 Puff(s) Inhalation two times a day  folic acid 1 milliGRAM(s) Oral daily  lisinopril 20 milliGRAM(s) Oral daily  multivitamin 1 Tablet(s) Oral daily  thiamine 100 milliGRAM(s) Oral daily    MEDICATIONS  (PRN):  acetaminophen     Tablet .. 650 milliGRAM(s) Oral every 6 hours PRN Temp greater or equal to 38C (100.4F), Mild Pain (1 - 3)  albuterol/ipratropium for Nebulization 3 milliLiter(s) Nebulizer every 6 hours PRN Shortness of Breath and/or Wheezing  aluminum hydroxide/magnesium hydroxide/simethicone Suspension 30 milliLiter(s) Oral every 4 hours PRN Dyspepsia  LORazepam   Injectable 2 milliGRAM(s) IV Push every 2 hours PRN CIWA-Ar score increase by 2 points and a total score of 7 or less  melatonin 3 milliGRAM(s) Oral at bedtime PRN Insomnia  ondansetron Injectable 4 milliGRAM(s) IV Push every 8 hours PRN Nausea and/or Vomiting    Vitals:  T(F): 98.4 (08-02-22 @ 05:10), Max: 98.8 (08-01-22 @ 16:20)  HR: 78 (08-02-22 @ 05:53) (74 - 92)  BP: 167/97 (08-02-22 @ 05:10) (130/58 - 167/97)  RR: 18 (08-02-22 @ 05:10) (18 - 20)  SpO2: 98% (08-02-22 @ 05:53) (95% - 98%)    08-01 @ 07:01  -  08-02 @ 07:00  --------------------------------------------------------  IN:    Oral Fluid: 480 mL  Total IN: 480 mL    OUT:    Voided (mL): 720 mL  Total OUT: 720 mL    Total NET: -240 mL    Weight (kg): 77 (07-31 @ 21:28)  BMI (kg/m2): 33.2 (07-31 @ 21:28)    PHYSICAL EXAM:  Neuro: Awake, responsive  CV: S1 S2 RRR  Lungs: CTABL  GI: Soft, BS +, ND, + mild tenderness   Extremities: No edema    TELEMETRY: sinus    RADIOLOGY: < from: Xray Chest 1 View- PORTABLE-Urgent (07.31.22 @ 00:16) >    No focal lung consolidation or sizable pleural effusion.    < end of copied text >    < from: CT Abdomen and Pelvis w/ IV Cont (08.01.22 @ 15:36) >  IMPRESSION: No evidence of acute inflammatory or obstructive process in   the abdomen and pelvis. New hepatic steatosis. Correlate clinically for   acute steatohepatitis. Right adrenal adenoma.    < end of copied text >    DIAGNOSTIC TESTING:    [x ] Echocardiogram:< from: TTE Echo Complete w/o Contrast w/ Doppler (03.13.22 @ 08:47) >  Left Ventricle: The left ventricular internal cavity size is mildly   increased. Left ventricular wall thickness is normal.  Global LV systolic function was mildly decreased. Left ventricular   ejection fraction, by visual estimation, is 45 to 50%. Normal segmental   left ventricular systolic function. Spectral Doppler shows pseudonormal   pattern of left ventricular myocardial filling (Grade II diastolic   dysfunction). Elevated mean left atrial pressure.  Right Ventricle: Normal right ventricular size and function.  Left Atrium: The left atrium is normal in size. Moderately enlarged left   atrium.  Right Atrium: The right atrium is normal in size.  Pericardium: There is no evidence of pericardial effusion.  Mitral Valve: Structurally normal mitral valve, with normal leaflet   excursion. Mild mitral valve regurgitation is seen.  Tricuspid Valve: Structurally normal tricuspid valve, with normal leaflet   excursion. No tricuspid regurgitation is visualized.  Aortic Valve: Normal trileaflet aortic valve with normal opening. Peak   transaortic gradient equals 4.6 mmHg, mean transaortic gradient equals   2.8 mmHg, the calculated aortic valve area equals 2.80 cm² by the   continuity equation consistent with normally opening aortic valve. No   evidence of aortic valve regurgitation is seen.  Pulmonic Valve: Structurally normal pulmonic valve, with normal leaflet   excursion. Mild pulmonic valve regurgitation.  Aorta: The aortic root and ascending aorta are structurally normal, with   no evidence of dilitation. There is dilatation of the aortic root.  Pulmonary Artery: The main pulmonary artery is normal in size.      Summary:   1. Left ventricular ejection fraction, by visual estimation, is 45 to   50%.   2. Mildly decreased global left ventricular systolic function.   3. Elevated mean left atrial pressure.   4. Spectral Doppler shows pseudonormal pattern of left ventricular   myocardial filling (Grade II diastolic dysfunction).   5. Moderately enlarged left atrium.   6. Mild mitral valve regurgitation.   7. Mild pulmonic valve regurgitation.   8. Dilatation of the aortic root.    < end of copied text >    LABS:	 	    CARDIAC MARKERS:  Troponin I, High Sensitivity Result: 47.3 ng/L (08-01 @ 14:05)  Troponin I, High Sensitivity Result: 162.4 ng/L (07-31 @ 03:50)  Troponin I, High Sensitivity Result: 150.6 ng/L (07-31 @ 00:53)    01 Aug 2022 07:10    140    |  107    |  15     ----------------------------<  100    4.2     |  23     |  0.96   31 Jul 2022 10:05    141    |  105    |  11     ----------------------------<  189    3.7     |  24     |  0.95   31 Jul 2022 00:53    148    |  109    |  9      ----------------------------<  83     3.0     |  27     |  0.96     Ca    8.7        01 Aug 2022 07:10  Phos  3.4       01 Aug 2022 07:10  Mg     2.1       01 Aug 2022 07:10    TPro  7.2    /  Alb  3.2    /  TBili  1.3    /  DBili  x      /  AST  400    /  ALT  91     /  AlkPhos  177    01 Aug 2022 07:10                        13.7   4.49  )-----------( 45       ( 01 Aug 2022 07:10 )             40.4 ,                       14.6   2.98  )-----------( 59       ( 31 Jul 2022 10:15 )             42.2 ,                       15.2   2.47  )-----------( 56       ( 31 Jul 2022 00:53 )             45.2   proBNP: Serum Pro-Brain Natriuretic Peptide: 13 pg/mL (07-31 @ 00:53)            
Patient is a 41y old  Male who presents with a chief complaint of Alcohol intoxication and Chest pain (03 Aug 2022 19:39)    INTERVAL HPI/OVERNIGHT EVENTS:  Pt was seen and examined, no acute events.    MEDICATIONS  (STANDING):  amLODIPine   Tablet 5 milliGRAM(s) Oral daily  betamethasone valerate 0.1% Cream 1 Application(s) Topical daily  budesonide 160 MICROgram(s)/formoterol 4.5 MICROgram(s) Inhaler 2 Puff(s) Inhalation two times a day  folic acid 1 milliGRAM(s) Oral daily  lisinopril 20 milliGRAM(s) Oral daily  multivitamin 1 Tablet(s) Oral daily  potassium chloride    Tablet ER 40 milliEquivalent(s) Oral once  potassium chloride  10 mEq/100 mL IVPB 10 milliEquivalent(s) IV Intermittent every 1 hour  thiamine 100 milliGRAM(s) Oral daily    MEDICATIONS  (PRN):  acetaminophen     Tablet .. 650 milliGRAM(s) Oral every 6 hours PRN Temp greater or equal to 38C (100.4F), Mild Pain (1 - 3)  albuterol/ipratropium for Nebulization 3 milliLiter(s) Nebulizer every 6 hours PRN Shortness of Breath and/or Wheezing  aluminum hydroxide/magnesium hydroxide/simethicone Suspension 30 milliLiter(s) Oral every 4 hours PRN Dyspepsia  cyclobenzaprine 5 milliGRAM(s) Oral three times a day PRN Muscle Spasm  LORazepam   Injectable 2 milliGRAM(s) IV Push every 2 hours PRN CIWA-Ar score increase by 2 points and a total score of 7 or less  melatonin 3 milliGRAM(s) Oral at bedtime PRN Insomnia  ondansetron   Disintegrating Tablet 4 milliGRAM(s) Oral every 8 hours PRN Nausea and/or Vomiting      Allergies  No Known Drug Allergies  shellfish (Angioedema)      Vital Signs Last 24 Hrs  T(C): 37.2 (04 Aug 2022 16:10), Max: 37.2 (04 Aug 2022 10:57)  T(F): 98.9 (04 Aug 2022 16:10), Max: 99 (04 Aug 2022 10:57)  HR: 80 (04 Aug 2022 16:10) (80 - 103)  BP: 129/86 (04 Aug 2022 16:10) (129/86 - 149/98)  BP(mean): --  RR: 18 (04 Aug 2022 16:10) (18 - 20)  SpO2: 98% (04 Aug 2022 16:10) (94% - 98%)    Parameters below as of 04 Aug 2022 16:10  Patient On (Oxygen Delivery Method): room air      PHYSICAL EXAM:  General: comfortable, no acute distress, well nourished  HEENT: Atraumatic, no LAD, trachea midline, PERRLA  Cardiovascular: normal s1s2, no murmurs, gallops or friction rubs  Pulmonary: clear to ausculation Bilaterally, no wheezing , rhonchi  Gastrointestinal: soft non tender non distended, no masses felt, no organomegally  Muscloskeletal: no lower extremity edema, intact bilateral lower extremity pulses  Neurological: CN II-12 intact. No focal weakness  Psychiatrical: normal mood, cooperative  SKIN: no rash, lesions or ulcers    LABS:                        14.5   6.58  )-----------( 49       ( 04 Aug 2022 07:00 )             42.3     08-04    136  |  99  |  15  ----------------------------<  103<H>  3.1<L>   |  30  |  0.87    Ca    9.0      04 Aug 2022 17:35  Phos  3.4     08-04  Mg     1.5     08-04    TPro  6.7  /  Alb  3.0<L>  /  TBili  1.2  /  DBili  x   /  AST  169<H>  /  ALT  60  /  AlkPhos  146<H>  08-04        CAPILLARY BLOOD GLUCOSE          RADIOLOGY & ADDITIONAL TESTS:    Imaging Personally Reviewed:  [ ] YES  [ ] NO    Consultant(s) Notes Reviewed:  [ ] YES  [ ] NO    Care Discussed with Consultants/Other Providers [ ] YES  [ ] NO
Patient seen and examined  no overnight events  patient feels well  has mild tremors  denies fever chills chest pain, feels better  ct abdomen benign echo on this admit improved  ciwa low  Review of Systems:  General:denies fever chills, headache, weakness  HEENT: denies blurry vision,diffculty swallowing, difficulty hearing, tinnitus  Cardiovascular: denies chest pain  ,palpitations  Pulmonary:denies shortness of breath, cough, wheezing, hemoptysis  Gastrointestinal: denies abdominal pain, constipation, diarrhea,nausea , vomiting, hematochezia  : denies hematuria, dysuria, or incontinence  Neurological: denies weakness, numbness , tingling, dizziness, tremors  MSK: denies muscle pain, difficulty ambulating, swelling, back pain  skin: denies skin rash, itching, burning, or  skin lesions  Psychiatrical: denies mood disturbances, anxierty, feeling depressed, depression , or difficulty sleeping    Objective:  Vitals  T(C): 36.7 (08-02-22 @ 11:06), Max: 37.1 (08-01-22 @ 16:20)  HR: 82 (08-02-22 @ 11:06) (74 - 92)  BP: 146/89 (08-02-22 @ 11:06) (146/89 - 167/97)  RR: 20 (08-02-22 @ 11:06) (18 - 20)  SpO2: 97% (08-02-22 @ 11:06) (95% - 98%)    Physical Exam:  General: comfortable, no acute distress, well nourished  HEENT: Atraumatic, no LAD, trachea midline, PERRLA  Cardiovascular: normal s1s2, no murmurs, gallops or fricition rubs  Pulmonary: clear to ausculation Bilaterally, no wheezing , rhonchi  Gastrointestinal: soft non tender non distended, no masses felt, no organomegally  Muscloskeletal: no lower extremity edema, intact bilateral lower extremity pulses  Neurological: CN II-12 intact. No focal weakness  Psychiatrical: normal mood, cooperative  SKIN: no rash, lesions or ulcers    Labs:                          13.7   4.49  )-----------( 45       ( 01 Aug 2022 07:10 )             40.4     08-01    140  |  107  |  15  ----------------------------<  100<H>  4.2   |  23  |  0.96    Ca    8.7      01 Aug 2022 07:10  Phos  3.4     08-01  Mg     2.1     08-01    TPro  7.2  /  Alb  3.2<L>  /  TBili  1.3<H>  /  DBili  x   /  AST  400<H>  /  ALT  91<H>  /  AlkPhos  177<H>  08-01    LIVER FUNCTIONS - ( 01 Aug 2022 07:10 )  Alb: 3.2 g/dL / Pro: 7.2 gm/dL / ALK PHOS: 177 U/L / ALT: 91 U/L / AST: 400 U/L / GGT: x                 Active Medications  MEDICATIONS  (STANDING):  amLODIPine   Tablet 5 milliGRAM(s) Oral daily  betamethasone valerate 0.1% Cream 1 Application(s) Topical daily  budesonide 160 MICROgram(s)/formoterol 4.5 MICROgram(s) Inhaler 2 Puff(s) Inhalation two times a day  folic acid 1 milliGRAM(s) Oral daily  lisinopril 20 milliGRAM(s) Oral daily  multivitamin 1 Tablet(s) Oral daily  thiamine 100 milliGRAM(s) Oral daily    MEDICATIONS  (PRN):  acetaminophen     Tablet .. 650 milliGRAM(s) Oral every 6 hours PRN Temp greater or equal to 38C (100.4F), Mild Pain (1 - 3)  albuterol/ipratropium for Nebulization 3 milliLiter(s) Nebulizer every 6 hours PRN Shortness of Breath and/or Wheezing  aluminum hydroxide/magnesium hydroxide/simethicone Suspension 30 milliLiter(s) Oral every 4 hours PRN Dyspepsia  LORazepam   Injectable 2 milliGRAM(s) IV Push every 2 hours PRN CIWA-Ar score increase by 2 points and a total score of 7 or less  melatonin 3 milliGRAM(s) Oral at bedtime PRN Insomnia  ondansetron Injectable 4 milliGRAM(s) IV Push every 8 hours PRN Nausea and/or Vomiting    
Review of Systems:  General:denies fever chills, headache, weakness  HEENT: denies blurry vision,diffculty swallowing, difficulty hearing, tinnitus  Cardiovascular: denies chest pain  ,palpitations  Pulmonary:denies shortness of breath, cough, wheezing, hemoptysis  Gastrointestinal: denies abdominal pain, constipation, diarrhea,nausea , vomiting, hematochezia  : denies hematuria, dysuria, or incontinence  Neurological: denies weakness, numbness , tingling, dizziness, tremors  MSK: denies muscle pain, difficulty ambulating, swelling, back pain  skin: denies skin rash, itching, burning, or  skin lesions  Psychiatrical: denies mood disturbances, anxierty, feeling depressed, depression , or difficulty sleeping    Objective:  Vitals  T(C): 36.7 (08-01-22 @ 11:29), Max: 37.1 (07-31-22 @ 20:45)  HR: 89 (08-01-22 @ 11:29) (69 - 99)  BP: 130/58 (08-01-22 @ 11:29) (118/81 - 150/85)  RR: 20 (08-01-22 @ 11:29) (17 - 20)  SpO2: 98% (08-01-22 @ 11:29) (93% - 99%)    Physical Exam:  General: comfortable, no acute distress, well nourished  HEENT: Atraumatic, no LAD, trachea midline, PERRLA  Cardiovascular: normal s1s2, no murmurs, gallops or fricition rubs  Pulmonary: clear to ausculation Bilaterally, no wheezing , rhonchi  Gastrointestinal: soft non tender non distended, no masses felt, no organomegally  Muscloskeletal: no lower extremity edema, intact bilateral lower extremity pulses  Neurological: CN II-12 intact. No focal weakness  Psychiatrical: normal mood, cooperative  SKIN: no rash, lesions or ulcers    Labs:                          13.7   4.49  )-----------( 45       ( 01 Aug 2022 07:10 )             40.4     08-01    140  |  107  |  15  ----------------------------<  100<H>  4.2   |  23  |  0.96    Ca    8.7      01 Aug 2022 07:10  Phos  3.4     08-01  Mg     2.1     08-01    TPro  7.2  /  Alb  3.2<L>  /  TBili  1.3<H>  /  DBili  x   /  AST  400<H>  /  ALT  91<H>  /  AlkPhos  177<H>  08-01    LIVER FUNCTIONS - ( 01 Aug 2022 07:10 )  Alb: 3.2 g/dL / Pro: 7.2 gm/dL / ALK PHOS: 177 U/L / ALT: 91 U/L / AST: 400 U/L / GGT: x                 Active Medications  MEDICATIONS  (STANDING):  albuterol/ipratropium for Nebulization 3 milliLiter(s) Nebulizer every 6 hours  amLODIPine   Tablet 5 milliGRAM(s) Oral daily  budesonide 160 MICROgram(s)/formoterol 4.5 MICROgram(s) Inhaler 2 Puff(s) Inhalation two times a day  folic acid 1 milliGRAM(s) Oral daily  lisinopril 20 milliGRAM(s) Oral daily  multivitamin 1 Tablet(s) Oral daily  thiamine 100 milliGRAM(s) Oral daily    MEDICATIONS  (PRN):  acetaminophen     Tablet .. 650 milliGRAM(s) Oral every 6 hours PRN Temp greater or equal to 38C (100.4F), Mild Pain (1 - 3)  aluminum hydroxide/magnesium hydroxide/simethicone Suspension 30 milliLiter(s) Oral every 4 hours PRN Dyspepsia  LORazepam   Injectable 2 milliGRAM(s) IV Push every 2 hours PRN CIWA-Ar score increase by 2 points and a total score of 7 or less  melatonin 3 milliGRAM(s) Oral at bedtime PRN Insomnia  ondansetron Injectable 4 milliGRAM(s) IV Push every 8 hours PRN Nausea and/or Vomiting

## 2022-08-04 NOTE — PROGRESS NOTE ADULT - REASON FOR ADMISSION
Alcohol intoxication and Chest pain

## 2022-08-05 ENCOUNTER — TRANSCRIPTION ENCOUNTER (OUTPATIENT)
Age: 42
End: 2022-08-05

## 2022-08-05 VITALS
OXYGEN SATURATION: 97 % | TEMPERATURE: 98 F | HEART RATE: 91 BPM | SYSTOLIC BLOOD PRESSURE: 128 MMHG | DIASTOLIC BLOOD PRESSURE: 86 MMHG | RESPIRATION RATE: 18 BRPM

## 2022-08-05 LAB
ANION GAP SERPL CALC-SCNC: 5 MMOL/L — SIGNIFICANT CHANGE UP (ref 5–17)
BUN SERPL-MCNC: 13 MG/DL — SIGNIFICANT CHANGE UP (ref 7–23)
CALCIUM SERPL-MCNC: 8.9 MG/DL — SIGNIFICANT CHANGE UP (ref 8.5–10.1)
CHLORIDE SERPL-SCNC: 102 MMOL/L — SIGNIFICANT CHANGE UP (ref 96–108)
CO2 SERPL-SCNC: 28 MMOL/L — SIGNIFICANT CHANGE UP (ref 22–31)
CREAT SERPL-MCNC: 0.74 MG/DL — SIGNIFICANT CHANGE UP (ref 0.5–1.3)
EGFR: 118 ML/MIN/1.73M2 — SIGNIFICANT CHANGE UP
GLUCOSE SERPL-MCNC: 130 MG/DL — HIGH (ref 70–99)
MAGNESIUM SERPL-MCNC: 1.8 MG/DL — SIGNIFICANT CHANGE UP (ref 1.6–2.6)
POTASSIUM SERPL-MCNC: 3.2 MMOL/L — LOW (ref 3.5–5.3)
POTASSIUM SERPL-SCNC: 3.2 MMOL/L — LOW (ref 3.5–5.3)
SODIUM SERPL-SCNC: 135 MMOL/L — SIGNIFICANT CHANGE UP (ref 135–145)

## 2022-08-05 PROCEDURE — 99239 HOSP IP/OBS DSCHRG MGMT >30: CPT

## 2022-08-05 RX ORDER — FOLIC ACID 0.8 MG
1 TABLET ORAL
Qty: 30 | Refills: 0
Start: 2022-08-05 | End: 2022-09-03

## 2022-08-05 RX ORDER — POTASSIUM CHLORIDE 20 MEQ
10 PACKET (EA) ORAL
Refills: 0 | Status: DISCONTINUED | OUTPATIENT
Start: 2022-08-05 | End: 2022-08-05

## 2022-08-05 RX ORDER — POTASSIUM CHLORIDE 20 MEQ
40 PACKET (EA) ORAL ONCE
Refills: 0 | Status: COMPLETED | OUTPATIENT
Start: 2022-08-05 | End: 2022-08-05

## 2022-08-05 RX ORDER — AMLODIPINE BESYLATE 2.5 MG/1
1 TABLET ORAL
Qty: 30 | Refills: 0
Start: 2022-08-05 | End: 2022-09-03

## 2022-08-05 RX ORDER — LISINOPRIL 2.5 MG/1
1 TABLET ORAL
Qty: 30 | Refills: 0
Start: 2022-08-05 | End: 2022-09-03

## 2022-08-05 RX ADMIN — Medication 40 MILLIEQUIVALENT(S): at 10:20

## 2022-08-05 RX ADMIN — Medication 40 MILLIEQUIVALENT(S): at 09:13

## 2022-08-05 RX ADMIN — AMLODIPINE BESYLATE 5 MILLIGRAM(S): 2.5 TABLET ORAL at 06:11

## 2022-08-05 RX ADMIN — BUDESONIDE AND FORMOTEROL FUMARATE DIHYDRATE 2 PUFF(S): 160; 4.5 AEROSOL RESPIRATORY (INHALATION) at 06:11

## 2022-08-05 RX ADMIN — LISINOPRIL 20 MILLIGRAM(S): 2.5 TABLET ORAL at 06:11

## 2022-08-05 NOTE — DISCHARGE NOTE PROVIDER - CARE PROVIDER_API CALL
PCP,   Phone: (   )    -  Fax: (   )    -  Follow Up Time:    PCP,   Phone: (   )    -  Fax: (   )    -  Follow Up Time:     Juancarlos Holley)  EndocrinologyMetabDiabetes  [address], [address]  [place] [address]  Phone: [phone]  Fax: [phone]  Follow Up Time:

## 2022-08-05 NOTE — DISCHARGE NOTE PROVIDER - NSDCMRMEDTOKEN_GEN_ALL_CORE_FT
amLODIPine 5 mg oral tablet: 1 tab(s) orally once a day  budesonide-formoterol 160 mcg-4.5 mcg/inh inhalation aerosol: 2 puff(s) inhaled 2 times a day   folic acid 1 mg oral tablet: 1 tab(s) orally once a day  lisinopril 20 mg oral tablet: 1 tab(s) orally once a day  Multiple Vitamins oral tablet: 1 tab(s) orally once a day  tiotropium 18 mcg inhalation capsule: 2 puff(s) inhaled once a day   triamcinolone 0.1% topical ointment: 1 application topically every 12 hours

## 2022-08-05 NOTE — DISCHARGE NOTE NURSING/CASE MANAGEMENT/SOCIAL WORK - PATIENT PORTAL LINK FT
You can access the FollowMyHealth Patient Portal offered by [facility] by registering at the following website: [email or web address]. By joining [facility]’s FollowMyHealth portal, you will also be able to view your health information using other applications (apps) compatible with our system.

## 2022-08-05 NOTE — DISCHARGE NOTE PROVIDER - ATTENDING ATTESTATION STATEMENT
not applicable not applicable not applicable not applicable I have personally seen and examined the patient. I have collaborated with and supervised the

## 2022-08-05 NOTE — DISCHARGE NOTE PROVIDER - PROVIDER TOKENS
FREE:[LAST:[PCP],PHONE:[(   )    -],FAX:[(   )    -]] FREE:[LAST:[PCP],PHONE:[(   )    -],FAX:[(   )    -]],PROVIDER:[TOKEN:[[id number]]]

## 2022-08-05 NOTE — DISCHARGE NOTE PROVIDER - HOSPITAL COURSE
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 says chest pain began today, also having some sob. chart review shows admission this year for sob, per reveiew of cardiology documentation patient with known alcoholic cardiomyopathy history of abnormal ekgs, and negative cardiac catheretization last year.  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 says chest pain began today, also having some sob. chart review shows admission this year for sob, per reveiew of cardiology documentation patient with known alcoholic cardiomyopathy history of abnormal ekgs, and negative cardiac catheretization last year.  trops 200 --> 130  alcohol level 500    1) Alcohol intoxication with high risk of DT  2) Chest Pain likely muscular   3) HTN  4) Hx of CHF    CT finding:  New hepatic steatosis. Correlate clinically for   acute steatohepatitis. Right adrenal adenoma.  Follow up with PCP and endo.  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 says chest pain began today, also having some sob. chart review shows admission this year for sob, per reveiew of cardiology documentation patient with known alcoholic cardiomyopathy history of abnormal ekgs, and negative cardiac catheretization last year.  trops 200 --> 130  alcohol level 500    1) Alcohol intoxication with high risk of DT  2) Chest Pain likely muscular   3) HTN  4) Chronic diastolic  CHF: repeat Echo in 8/1, EF improved and systolic dysfunction is resolved    CT finding:  New hepatic steatosis. Right adrenal adenoma.  Follow up with PCP and endo.  LFTs stable    Thrombocytopenia:  - Plt stable    Hypokalemia:  - Replaced

## 2022-08-05 NOTE — DISCHARGE NOTE PROVIDER - NSDCCPCAREPLAN_GEN_ALL_CORE_FT
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 says chest pain began today, also having some sob. chart review shows admission this year for sob, per reveiew of cardiology documentation patient with known alcoholic cardiomyopathy history of abnormal ekgs, and negative cardiac catheretization last year.  trops 200 --> 130  alcohol level 500  1) Alcohol intoxication with high risk of DT  2) Chest Pain likely muscular   3) HTN  4) Hx of CHF  CT finding:  New hepatic steatosis. Correlate clinically for   acute steatohepatitis. Right adrenal adenoma.  Follow up with PCP and endo.  LFTs stable

## 2022-08-09 DIAGNOSIS — E83.42 HYPOMAGNESEMIA: ICD-10-CM

## 2022-08-09 DIAGNOSIS — E87.6 HYPOKALEMIA: ICD-10-CM

## 2022-08-09 DIAGNOSIS — K76.0 FATTY (CHANGE OF) LIVER, NOT ELSEWHERE CLASSIFIED: ICD-10-CM

## 2022-08-09 DIAGNOSIS — R74.01 ELEVATION OF LEVELS OF LIVER TRANSAMINASE LEVELS: ICD-10-CM

## 2022-08-09 DIAGNOSIS — D35.01 BENIGN NEOPLASM OF RIGHT ADRENAL GLAND: ICD-10-CM
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
## 2022-08-09 DIAGNOSIS — D69.6 THROMBOCYTOPENIA, UNSPECIFIED: ICD-10-CM

## 2022-08-09 DIAGNOSIS — Z91.013 ALLERGY TO SEAFOOD: ICD-10-CM

## 2022-08-09 DIAGNOSIS — F10.129 ALCOHOL ABUSE WITH INTOXICATION, UNSPECIFIED: ICD-10-CM

## 2022-08-09 DIAGNOSIS — I11.0 HYPERTENSIVE HEART DISEASE WITH HEART FAILURE: ICD-10-CM

## 2022-08-09 DIAGNOSIS — I50.32 CHRONIC DIASTOLIC (CONGESTIVE) HEART FAILURE: ICD-10-CM

## 2022-08-09 DIAGNOSIS — J45.909 UNSPECIFIED ASTHMA, UNCOMPLICATED: ICD-10-CM

## 2022-08-09 DIAGNOSIS — I42.6 ALCOHOLIC CARDIOMYOPATHY: ICD-10-CM
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
## 2022-08-09 DIAGNOSIS — R07.89 OTHER CHEST PAIN: ICD-10-CM

## 2022-08-09 DIAGNOSIS — S09.90XA UNSPECIFIED INJURY OF HEAD, INITIAL ENCOUNTER: ICD-10-CM

## 2023-01-13 PROBLEM — Z00.00 ENCOUNTER FOR PREVENTIVE HEALTH EXAMINATION: Status: ACTIVE | Noted: 2023-01-13

## 2023-05-03 ENCOUNTER — EMERGENCY (EMERGENCY)
Facility: HOSPITAL | Age: 43
LOS: 0 days | Discharge: HOME | End: 2023-05-03
Attending: STUDENT IN AN ORGANIZED HEALTH CARE EDUCATION/TRAINING PROGRAM
Payer: MEDICAID

## 2023-05-03 VITALS
RESPIRATION RATE: 17 BRPM | DIASTOLIC BLOOD PRESSURE: 112 MMHG | HEART RATE: 76 BPM | OXYGEN SATURATION: 99 % | WEIGHT: 240.08 LBS | HEIGHT: 69 IN | SYSTOLIC BLOOD PRESSURE: 176 MMHG

## 2023-05-03 VITALS
HEART RATE: 88 BPM | OXYGEN SATURATION: 99 % | RESPIRATION RATE: 20 BRPM | TEMPERATURE: 98 F | SYSTOLIC BLOOD PRESSURE: 140 MMHG | DIASTOLIC BLOOD PRESSURE: 85 MMHG

## 2023-05-03 DIAGNOSIS — R06.2 WHEEZING: ICD-10-CM

## 2023-05-03 DIAGNOSIS — J45.901 UNSPECIFIED ASTHMA WITH (ACUTE) EXACERBATION: ICD-10-CM

## 2023-05-03 DIAGNOSIS — F10.229 ALCOHOL DEPENDENCE WITH INTOXICATION, UNSPECIFIED: ICD-10-CM

## 2023-05-03 DIAGNOSIS — R07.89 OTHER CHEST PAIN: ICD-10-CM

## 2023-05-03 DIAGNOSIS — Z20.822 CONTACT WITH AND (SUSPECTED) EXPOSURE TO COVID-19: ICD-10-CM

## 2023-05-03 DIAGNOSIS — R06.02 SHORTNESS OF BREATH: ICD-10-CM

## 2023-05-03 DIAGNOSIS — Z86.79 PERSONAL HISTORY OF OTHER DISEASES OF THE CIRCULATORY SYSTEM: ICD-10-CM

## 2023-05-03 DIAGNOSIS — I10 ESSENTIAL (PRIMARY) HYPERTENSION: ICD-10-CM

## 2023-05-03 DIAGNOSIS — J45.909 UNSPECIFIED ASTHMA, UNCOMPLICATED: ICD-10-CM

## 2023-05-03 LAB
ALBUMIN SERPL ELPH-MCNC: 3.3 G/DL — SIGNIFICANT CHANGE UP (ref 3.3–5)
ALP SERPL-CCNC: 136 U/L — HIGH (ref 40–120)
ALT FLD-CCNC: 34 U/L — SIGNIFICANT CHANGE UP (ref 12–78)
ANION GAP SERPL CALC-SCNC: 4 MMOL/L — LOW (ref 5–17)
APTT BLD: 25.8 SEC — LOW (ref 27.5–35.5)
AST SERPL-CCNC: 53 U/L — HIGH (ref 15–37)
BASE EXCESS BLDA CALC-SCNC: 2.8 MMOL/L — SIGNIFICANT CHANGE UP (ref -2–3)
BASOPHILS # BLD AUTO: 0.12 K/UL — SIGNIFICANT CHANGE UP (ref 0–0.2)
BASOPHILS NFR BLD AUTO: 3.3 % — HIGH (ref 0–2)
BILIRUB SERPL-MCNC: 0.5 MG/DL — SIGNIFICANT CHANGE UP (ref 0.2–1.2)
BLOOD GAS COMMENTS ARTERIAL: SIGNIFICANT CHANGE UP
BUN SERPL-MCNC: 16 MG/DL — SIGNIFICANT CHANGE UP (ref 7–23)
CALCIUM SERPL-MCNC: 7.5 MG/DL — LOW (ref 8.5–10.1)
CHLORIDE SERPL-SCNC: 108 MMOL/L — SIGNIFICANT CHANGE UP (ref 96–108)
CO2 BLDA-SCNC: 30 MMOL/L — HIGH (ref 19–24)
CO2 SERPL-SCNC: 29 MMOL/L — SIGNIFICANT CHANGE UP (ref 22–31)
CREAT SERPL-MCNC: 1.04 MG/DL — SIGNIFICANT CHANGE UP (ref 0.5–1.3)
EGFR: 93 ML/MIN/1.73M2 — SIGNIFICANT CHANGE UP
EOSINOPHIL # BLD AUTO: 0.63 K/UL — HIGH (ref 0–0.5)
EOSINOPHIL NFR BLD AUTO: 17.5 % — HIGH (ref 0–6)
ETHANOL SERPL-MCNC: 448 MG/DL — HIGH (ref 0–10)
GAS PNL BLDA: SIGNIFICANT CHANGE UP
GLUCOSE SERPL-MCNC: 275 MG/DL — HIGH (ref 70–99)
HCO3 BLDA-SCNC: 28 MMOL/L — SIGNIFICANT CHANGE UP (ref 21–28)
HCT VFR BLD CALC: 37.5 % — LOW (ref 39–50)
HGB BLD-MCNC: 12.4 G/DL — LOW (ref 13–17)
HOROWITZ INDEX BLDA+IHG-RTO: 21 — SIGNIFICANT CHANGE UP
IMM GRANULOCYTES NFR BLD AUTO: 0.3 % — SIGNIFICANT CHANGE UP (ref 0–0.9)
INR BLD: 0.92 RATIO — SIGNIFICANT CHANGE UP (ref 0.88–1.16)
LIDOCAIN IGE QN: 418 U/L — HIGH (ref 73–393)
LYMPHOCYTES # BLD AUTO: 1.48 K/UL — SIGNIFICANT CHANGE UP (ref 1–3.3)
LYMPHOCYTES # BLD AUTO: 41.2 % — SIGNIFICANT CHANGE UP (ref 13–44)
MAGNESIUM SERPL-MCNC: 2.6 MG/DL — SIGNIFICANT CHANGE UP (ref 1.6–2.6)
MCHC RBC-ENTMCNC: 30.8 PG — SIGNIFICANT CHANGE UP (ref 27–34)
MCHC RBC-ENTMCNC: 33.1 G/DL — SIGNIFICANT CHANGE UP (ref 32–36)
MCV RBC AUTO: 93.3 FL — SIGNIFICANT CHANGE UP (ref 80–100)
MONOCYTES # BLD AUTO: 0.26 K/UL — SIGNIFICANT CHANGE UP (ref 0–0.9)
MONOCYTES NFR BLD AUTO: 7.2 % — SIGNIFICANT CHANGE UP (ref 2–14)
NEUTROPHILS # BLD AUTO: 1.09 K/UL — LOW (ref 1.8–7.4)
NEUTROPHILS NFR BLD AUTO: 30.5 % — LOW (ref 43–77)
NRBC # BLD: 0 /100 WBCS — SIGNIFICANT CHANGE UP (ref 0–0)
NT-PROBNP SERPL-SCNC: 59 PG/ML — SIGNIFICANT CHANGE UP (ref 0–125)
PCO2 BLDA: 47 MMHG — HIGH (ref 32–46)
PH BLDA: 7.39 — SIGNIFICANT CHANGE UP (ref 7.35–7.45)
PHOSPHATE SERPL-MCNC: 3.4 MG/DL — SIGNIFICANT CHANGE UP (ref 2.5–4.5)
PLATELET # BLD AUTO: 161 K/UL — SIGNIFICANT CHANGE UP (ref 150–400)
PO2 BLDA: 70 MMHG — LOW (ref 83–108)
POTASSIUM SERPL-MCNC: 3.1 MMOL/L — LOW (ref 3.5–5.3)
POTASSIUM SERPL-SCNC: 3.1 MMOL/L — LOW (ref 3.5–5.3)
PROT SERPL-MCNC: 7.2 GM/DL — SIGNIFICANT CHANGE UP (ref 6–8.3)
PROTHROM AB SERPL-ACNC: 10.9 SEC — SIGNIFICANT CHANGE UP (ref 10.5–13.4)
RAPID RVP RESULT: SIGNIFICANT CHANGE UP
RBC # BLD: 4.02 M/UL — LOW (ref 4.2–5.8)
RBC # FLD: 14.5 % — SIGNIFICANT CHANGE UP (ref 10.3–14.5)
SAO2 % BLDA: 92 % — LOW (ref 94–98)
SARS-COV-2 RNA SPEC QL NAA+PROBE: SIGNIFICANT CHANGE UP
SODIUM SERPL-SCNC: 141 MMOL/L — SIGNIFICANT CHANGE UP (ref 135–145)
TROPONIN I, HIGH SENSITIVITY RESULT: 57.5 NG/L — SIGNIFICANT CHANGE UP
WBC # BLD: 3.59 K/UL — LOW (ref 3.8–10.5)
WBC # FLD AUTO: 3.59 K/UL — LOW (ref 3.8–10.5)

## 2023-05-03 PROCEDURE — 99285 EMERGENCY DEPT VISIT HI MDM: CPT

## 2023-05-03 PROCEDURE — 72125 CT NECK SPINE W/O DYE: CPT | Mod: 26,MA

## 2023-05-03 PROCEDURE — 70450 CT HEAD/BRAIN W/O DYE: CPT | Mod: 26,MA

## 2023-05-03 PROCEDURE — 93010 ELECTROCARDIOGRAM REPORT: CPT

## 2023-05-03 PROCEDURE — 71045 X-RAY EXAM CHEST 1 VIEW: CPT | Mod: 26

## 2023-05-03 RX ORDER — ALBUTEROL 90 UG/1
2 AEROSOL, METERED ORAL
Qty: 1 | Refills: 0
Start: 2023-05-03 | End: 2023-05-07

## 2023-05-03 RX ORDER — FAMOTIDINE 10 MG/ML
20 INJECTION INTRAVENOUS ONCE
Refills: 0 | Status: COMPLETED | OUTPATIENT
Start: 2023-05-03 | End: 2023-05-03

## 2023-05-03 RX ORDER — IPRATROPIUM/ALBUTEROL SULFATE 18-103MCG
3 AEROSOL WITH ADAPTER (GRAM) INHALATION ONCE
Refills: 0 | Status: COMPLETED | OUTPATIENT
Start: 2023-05-03 | End: 2023-05-03

## 2023-05-03 RX ORDER — POTASSIUM CHLORIDE 20 MEQ
10 PACKET (EA) ORAL ONCE
Refills: 0 | Status: COMPLETED | OUTPATIENT
Start: 2023-05-03 | End: 2023-05-03

## 2023-05-03 RX ORDER — POTASSIUM CHLORIDE 20 MEQ
10 PACKET (EA) ORAL ONCE
Refills: 0 | Status: DISCONTINUED | OUTPATIENT
Start: 2023-05-03 | End: 2023-05-03

## 2023-05-03 RX ADMIN — FAMOTIDINE 20 MILLIGRAM(S): 10 INJECTION INTRAVENOUS at 02:11

## 2023-05-03 RX ADMIN — Medication 3 MILLILITER(S): at 02:10

## 2023-05-03 RX ADMIN — Medication 10 MILLIEQUIVALENT(S): at 07:03

## 2023-05-03 RX ADMIN — Medication 100 MILLIEQUIVALENT(S): at 06:03
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
## 2023-05-03 NOTE — ED PROVIDER NOTE - PHYSICAL EXAMINATION
VITAL SIGNS: I have reviewed nursing notes and confirm.   GEN: Well-developed; well-nourished; in no acute distress. Speaking full sentences. somnolent intoxicated appearing disheveled.   SKIN: Warm, pink, no rash, no diaphoresis, no cyanosis, well perfused.   HEAD: Normocephalic; atraumatic. No scalp lacerations, no abrasions.  NECK: Supple; non tender.   EYES: Pupils 3mm equal, round, reactive to light and accomodation, conjunctiva and sclera clear. Extra-ocular movements intact bilaterally.  ENT: No nasal discharge; airway clear. Trachea is midline. ORAL: No oropharyngeal exudates or erythema. Normal dentition.  CV: Regular rate and rhythm. S1, S2 normal; no murmurs, gallops, or rubs. No lower extremity pitting edema bilaterally. Capillary refill < 2 seconds throughout. Distal pulses intact 2+ throughout.  RESP: (+) good air entry, no retractions,   ABD: Normal bowel sounds, soft, non-distended, non-tender, no rebound, no guarding, no rigidity, no hepatosplenomegaly. No CVA tenderness bilaterally.  MSK: Normal range of motion and movement of all 4 extremities. No joint or muscular pain throughout. No clubbing.   BACK: No thoracolumbar midline or paravertebral tenderness. No step-offs or obvious deformities.  NEURO: Alert & oriented x 3, Grossly unremarkable. Sensory and motor intact throughout. No focal deficits.  Normal speech and coordination.   PSYCH: Cooperative, appropriate.

## 2023-05-03 NOTE — ED PROVIDER NOTE - CROS ED ROS STATEMENT
Pacemaker Battery at End of Life Pacemaker Battery at End of Life Pacemaker Battery at End of Life Pacemaker Battery at End of Life Pacemaker Battery at End of Life Pacemaker Battery at End of Life all other ROS negative except as per HPI

## 2023-05-03 NOTE — ED ADULT NURSE NOTE - NSIMPLEMENTINTERV_GEN_ALL_ED
Implemented All Fall Risk Interventions:  [place] to call system. Call bell, personal items and telephone within reach. Instruct patient to call for assistance. Room bathroom lighting operational. Non-slip footwear when patient is off stretcher. Physically safe environment: no spills, clutter or unnecessary equipment. Stretcher in lowest position, wheels locked, appropriate side rails in place. Provide visual cue, wrist band, yellow gown, etc. Monitor gait and stability. Monitor for mental status changes and reorient to person, place, and time. Review medications for side effects contributing to fall risk. Reinforce activity limits and safety measures with patient and family.
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
## 2023-05-03 NOTE — ED PROVIDER NOTE - PATIENT PORTAL LINK FT
You can access the FollowMyHealth Patient Portal offered by [facility] by registering at the following website: [email or web address]. By joining [employer]’s FollowMyHealth portal, you will also be able to view your health information using other applications (apps) compatible with our system.

## 2023-05-03 NOTE — ED PROVIDER NOTE - CLINICAL SUMMARY MEDICAL DECISION MAKING FREE TEXT BOX
Patient presenting with history of alcohol dependence, sluggish behavior, altered mental status, and smelling of alcohol most consistent with ethanol intoxication. They are hemodynamically stable and afebrile. Bedside glucose check without evidence of severe hypoglycemia. No signs of significant trauma or other etiology of altered mental status on initial physical exam. Airway is maintained. There is no respiratory depression. Considered DDX but unlikely given history and exam: intracranial bleed, opioid vs benzodiazepine/GHB intoxication, other substance use, sepsis, hypothyroidism, hypothermia.  Also, Pt presents with symptoms most consistent w/ an acute asthma exacerbation. The likely precipitant is  weather change, air quality,  Low suspicion for alternate etiologies such as pneumonia, foreign body airway obstruction, pneumothorax, acute pulmonary embolism, ACS/NSTEMI, CHF, or cardiac effusion/tamponade.   LOW RISK: Patient has no high risk factors including: no AMS, silent respirations, belly-breathing, or other signs of impending ventilatory failure. Patient has no prior admissions to the hospital and no prior intubations/ICU admissions for asthma exacerbation. The patient is hemodynamically stable without [id number] > 95% on RA.  PLAN:  - Maintain oxygen saturations >95% with supplemental oxygen PRN. BIPAP PRN worsening work of breathing.  - Trial of duonebs x 3 with solumedrol 125 mg IVP, Serial re-assessments.   - Continuous albuterol PRN, 1-2g Magnesium sulfate IVPB x 2 PRN, 0.3 mg IM epinephrine if worsening PRN.  - CXR to evaluate for other acute cardiopulmonary processes  - Observe until clinically sober, with re-evaluation demonstrating ability to ambulate safely, tolerate oral intake, articulate a safe discharge plan.   - [facility]-symptom triggered protocol for alcohol withdrawal.   - IM lorazepam or midazolam PRN agitation.  - Thiamine 100mg IVP, Folic acid 1mg IVP, and multivitamin.  - FSG, BAL, CBC/CMP, trop, bnp, cxr, abg duonebs if worsening wheezing, steroids already given by ems  -  CT HEAD/NECK to rule out traumatic injuries and ICH/subdural hemorrhage Patient presenting with history of alcohol dependence, sluggish behavior, altered mental status, and smelling of alcohol most consistent with ethanol intoxication. They are hemodynamically stable and afebrile. Bedside glucose check without evidence of severe hypoglycemia. No signs of significant trauma or other etiology of altered mental status on initial physical exam. Airway is maintained. There is no respiratory depression. Considered DDX but unlikely given history and exam: intracranial bleed, opioid vs benzodiazepine/GHB intoxication, other substance use, sepsis, hypothyroidism, hypothermia.  Also, Pt presents with symptoms most consistent w/ an acute asthma exacerbation. The likely precipitant is  weather change, air quality,  Low suspicion for alternate etiologies such as pneumonia, foreign body airway obstruction, pneumothorax, acute pulmonary embolism, ACS/NSTEMI, CHF, or cardiac effusion/tamponade.   LOW RISK: Patient has no high risk factors including: no AMS, silent respirations, belly-breathing, or other signs of impending ventilatory failure. Patient has no prior admissions to the hospital and no prior intubations/ICU admissions for asthma exacerbation. The patient is hemodynamically stable without [id number] > 95% on RA.  PLAN:  - Maintain oxygen saturations >95% with supplemental oxygen PRN. BIPAP PRN worsening work of breathing.  - Trial of duonebs x 3 with solumedrol 125 mg IVP, Serial re-assessments.   - Continuous albuterol PRN, 1-2g Magnesium sulfate IVPB x 2 PRN, 0.3 mg IM epinephrine if worsening PRN.  - CXR to evaluate for other acute cardiopulmonary processes  - Observe until clinically sober, with re-evaluation demonstrating ability to ambulate safely, tolerate oral intake, articulate a safe discharge plan.   - [facility]-symptom triggered protocol for alcohol withdrawal.   - IM lorazepam or midazolam PRN agitation.  - Thiamine 100mg IVP, Folic acid 1mg IVP, and multivitamin.  - FSG, BAL, CBC/CMP, trop, bnp, cxr, abg duonebs if worsening wheezing, steroids already given by ems  -  CT HEAD/NECK to rule out traumatic injuries and ICH/subdural hemorrhage    Pt otherwise will be dced given improvement of wheezing, no longer with hypoxia, 96% on RA - no retractions, clinical sobriety noted- able to stand and walk without issue and steady on feet, A&Ox3.

## 2023-05-03 NOTE — ED ADULT NURSE NOTE - OBJECTIVE STATEMENT
Covering for primary RN Marko. patient BIBA called for wheezing.  patient is lethargic during encounter and poor historian. patient admits to alcohol ingestion earlier today. patient endorses nausea but continuously falls asleep. EMS gave dexamethsone, magnesium, 2 comps. unable to obtain pmh. patient placed on cardiac monitor, saturating 97% on RA.
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
## 2023-05-03 NOTE — ED PROVIDER NOTE - NSFOLLOWUPINSTRUCTIONS_ED_ALL_ED_FT
Asthma, Adult  Outline of a person's upper body showing the lungs, with close-ups of two airways, one normal and one narrow.  Asthma is a long-term (chronic) condition that causes recurrent episodes in which the lower airways in the lungs become tight and narrow. The narrowing is caused by inflammation and tightening of the smooth muscle around the lower airways.    Asthma episodes, also called asthma attacks or asthma flares, may cause coughing, making high-pitched whistling sounds when you breathe, most often when you breathe out (wheezing), shortness of breath, and chest pain. The airways may produce extra mucus caused by the inflammation and irritation. During an attack, it can be difficult to breathe. Asthma attacks can range from minor to life-threatening.    Asthma cannot be cured, but medicines and lifestyle changes can help control it and treat acute attacks. It is important to keep your asthma well controlled so the condition does not interfere with your daily life.    What are the causes?  This condition is believed to be caused by inherited (genetic) and environmental factors, but its exact cause is not known.    What can trigger an asthma attack?    Many things can bring on an asthma attack or make symptoms worse. These triggers are different for every person. Common triggers include:  Allergens and irritants like mold, dust, pet dander, cockroaches, pollen, air pollution, and chemical odors.  Cigarette smoke.  Weather changes and cold air.  Stress and strong emotional responses such as crying or laughing hard.  Certain medications such as aspirin or beta blockers.  Infections and inflammatory conditions, such as the flu, a cold, pneumonia, or inflammation of the nasal membranes (rhinitis).  Gastroesophageal reflux disease (GERD).  What are the signs or symptoms?  Symptoms may occur right after exposure to an asthma trigger or hours later and can vary by person. Common signs and symptoms include:  Wheezing.  Trouble breathing (shortness of breath).  Excessive nighttime or early morning coughing.  Chest tightness.  Tiredness (fatigue) with minimal activity.  Difficulty talking in complete sentences.  Poor exercise tolerance.  How is this diagnosed?  This condition is diagnosed based on:  A physical exam and your medical history.  Tests, which may include:  Lung function studies to evaluate the flow of air in your lungs.  Allergy tests.  Imaging tests, such as X-rays.  How is this treated?  There is no cure, but symptoms can be controlled with proper treatment. Treatment usually involves:  Identifying and avoiding your asthma triggers.  Inhaled medicines. Two types are commonly used to treat asthma, depending on severity:  Controller medicines. These help prevent asthma symptoms from occurring. They are taken every day.  Fast-acting reliever or rescue medicines. These quickly relieve asthma symptoms. They are used as needed and provide short-term relief.  Using other medicines, such as:  Allergy medicines, such as antihistamines, if your asthma attacks are triggered by allergens.  Immune medicines (immunomodulators). These are medicines that help control the immune system.  Using supplemental oxygen. This is only needed during a severe episode.  Creating an asthma action plan. An asthma action plan is a written plan for managing and treating your asthma attacks. This plan includes:  A list of your asthma triggers and how to avoid them.  Information about when medicines should be taken and when their dosage should be changed.  Instructions about using a device called a peak flow meter. A peak flow meter measures how well the lungs are working and the severity of your asthma. It helps you monitor your condition.  Follow these instructions at home:  Take over-the-counter and prescription medicines only as told by your health care provider.  Stay up to date on all vaccinations as recommended by your healthcare provider, including vaccines for the flu and pneumonia.  Use a peak flow meter and keep track of your peak flow readings.  Understand and use your asthma action plan to address any asthma flares.  Do not smoke or allow anyone to smoke in your home.  Contact a health care provider if:  You have wheezing, shortness of breath, or a cough that is not responding to medicines.  Your medicines are causing side effects, such as a rash, itching, swelling, or trouble breathing.  You need to use a reliever medicine more than 2–3 times a week.  Your peak flow reading is still at 50–79% of your personal best after following your action plan for 1 hour.  You have a fever and shortness of breath.  Get help right away if:  You are getting worse and do not respond to treatment during an asthma attack.  You are short of breath when at rest or when doing very little physical activity.  You have difficulty eating, drinking, or talking.  You have chest pain or tightness.  You develop a fast heartbeat or palpitations.  You have a bluish color to your lips or fingernails.  You are light-headed or dizzy, or you faint.  Your peak flow reading is less than 50% of your personal best.  You feel too tired to breathe normally.  These symptoms may be an emergency. Get help right away. Call 911.  Do not wait to see if the symptoms will go away.  Do not drive yourself to the hospital.  Summary  Asthma is a long-term (chronic) condition that causes recurrent episodes in which the airways become tight and narrow. Asthma episodes, also called asthma attacks or asthma flares, can cause coughing, wheezing, shortness of breath, and chest pain.  Asthma cannot be cured, but medicines and lifestyle changes can help keep it well controlled and prevent asthma flares.  Make sure you understand how to avoid triggers and how and when to use your medicines.  Asthma attacks can range from minor to life-threatening. Get help right away if you have an asthma attack and do not respond to treatment with your usual rescue medicines.  This information is not intended to replace advice given to you by your health care provider. Make sure you discuss any questions you have with your health care provider.

## 2023-10-07 ENCOUNTER — INPATIENT (INPATIENT)
Facility: HOSPITAL | Age: 43
LOS: 3 days | Discharge: HOME | End: 2023-10-11
Attending: INTERNAL MEDICINE | Admitting: INTERNAL MEDICINE
Payer: MEDICAID

## 2023-10-07 VITALS
HEIGHT: 66 IN | RESPIRATION RATE: 17 BRPM | DIASTOLIC BLOOD PRESSURE: 79 MMHG | WEIGHT: 139.99 LBS | TEMPERATURE: 98 F | OXYGEN SATURATION: 97 % | SYSTOLIC BLOOD PRESSURE: 121 MMHG | HEART RATE: 73 BPM

## 2023-10-07 PROCEDURE — 99285 EMERGENCY DEPT VISIT HI MDM: CPT

## 2023-10-07 NOTE — ED ADULT NURSE NOTE - ED STAT RN HANDOFF DETAILS
report given to ADITI Gao. pt alert and oriented x4, denies pain or discomfort at this time. in no acute respiratory distress or discomfort. on continuous cardiac monitor and pulse ox.

## 2023-10-07 NOTE — ED ADULT NURSE NOTE - NSFALLHARMRISKINTERV_ED_ALL_ED
Assistance OOB with selected safe patient handling equipment if applicable/Assistance with ambulation/Communicate risk of Fall with Harm to all staff, patient, and family/Monitor gait and stability/Monitor for mental status changes and reorient to person, place, and time, as needed/Provide visual cue: red socks, yellow wristband, yellow gown, etc/Reinforce activity limits and safety measures with patient and family/Toileting schedule using arm’s reach rule for commode and bathroom/Use of alarms - bed, stretcher, chair and/or video monitoring/Bed in lowest position, wheels locked, appropriate side rails in place/Call bell, personal items and telephone in reach/Instruct patient to call for assistance before getting out of bed/chair/stretcher/Non-slip footwear applied when patient is off stretcher/[place] to call system/Physically safe environment - no spills, clutter or unnecessary equipment/Purposeful Proactive Rounding/Room/bathroom lighting operational, light cord in reach

## 2023-10-07 NOTE — ED ADULT NURSE NOTE - OBJECTIVE STATEMENT
Pt lethargic, sleeping in stretcher, arouses to voice. Pt found on the street admitted use cocaine, marijuana, alcohol and heroin. No trauma, denies any PMH. Pt lethargic, opens eyes to voice. Pt found on the street admitted use cocaine, marijuana, alcohol and heroin. No trauma, denies any PMH. Placed on monitor, NSR. Sating 88 on room air, Placed on 3L NC, sating 99%. Respirations 14.

## 2023-10-07 NOTE — ED ADULT TRIAGE NOTE - CHIEF COMPLAINT QUOTE
found on the street admitted use cocaine marihuana a , heroine and alcohol found on the street admitted use cocaine marijuana a , heroine and alcohol, abrasion right forehead.

## 2023-10-08 LAB
AMPHET UR-MCNC: NEGATIVE — SIGNIFICANT CHANGE UP
ANION GAP SERPL CALC-SCNC: 10 MMOL/L — SIGNIFICANT CHANGE UP (ref 5–17)
APAP SERPL-MCNC: < 2 UG/ML (ref 10–30)
BARBITURATES UR SCN-MCNC: NEGATIVE — SIGNIFICANT CHANGE UP
BASOPHILS # BLD AUTO: 0.06 K/UL — SIGNIFICANT CHANGE UP (ref 0–0.2)
BASOPHILS NFR BLD AUTO: 1.6 % — SIGNIFICANT CHANGE UP (ref 0–2)
BENZODIAZ UR-MCNC: POSITIVE — SIGNIFICANT CHANGE UP
BUN SERPL-MCNC: 18 MG/DL — SIGNIFICANT CHANGE UP (ref 7–23)
CALCIUM SERPL-MCNC: 8 MG/DL — LOW (ref 8.5–10.1)
CHLORIDE SERPL-SCNC: 111 MMOL/L — HIGH (ref 96–108)
CO2 SERPL-SCNC: 22 MMOL/L — SIGNIFICANT CHANGE UP (ref 22–31)
COCAINE METAB.OTHER UR-MCNC: NEGATIVE — SIGNIFICANT CHANGE UP
CREAT SERPL-MCNC: 1.02 MG/DL — SIGNIFICANT CHANGE UP (ref 0.5–1.3)
EGFR: 95 ML/MIN/1.73M2 — SIGNIFICANT CHANGE UP
EOSINOPHIL # BLD AUTO: 0.73 K/UL — HIGH (ref 0–0.5)
EOSINOPHIL NFR BLD AUTO: 19.2 % — HIGH (ref 0–6)
ETHANOL SERPL-MCNC: 374 MG/DL — HIGH (ref 0–10)
GLUCOSE SERPL-MCNC: 69 MG/DL — LOW (ref 70–99)
HCT VFR BLD CALC: 37.6 % — LOW (ref 39–50)
HGB BLD-MCNC: 11.9 G/DL — LOW (ref 13–17)
IMM GRANULOCYTES NFR BLD AUTO: 0.3 % — SIGNIFICANT CHANGE UP (ref 0–0.9)
LYMPHOCYTES # BLD AUTO: 0.79 K/UL — LOW (ref 1–3.3)
LYMPHOCYTES # BLD AUTO: 20.7 % — SIGNIFICANT CHANGE UP (ref 13–44)
MANUAL SMEAR VERIFICATION: SIGNIFICANT CHANGE UP
MCHC RBC-ENTMCNC: 29.6 PG — SIGNIFICANT CHANGE UP (ref 27–34)
MCHC RBC-ENTMCNC: 31.6 G/DL — LOW (ref 32–36)
MCV RBC AUTO: 93.5 FL — SIGNIFICANT CHANGE UP (ref 80–100)
METHADONE UR-MCNC: NEGATIVE — SIGNIFICANT CHANGE UP
MONOCYTES # BLD AUTO: 0.17 K/UL — SIGNIFICANT CHANGE UP (ref 0–0.9)
MONOCYTES NFR BLD AUTO: 4.5 % — SIGNIFICANT CHANGE UP (ref 2–14)
NEUTROPHILS # BLD AUTO: 2.05 K/UL — SIGNIFICANT CHANGE UP (ref 1.8–7.4)
NEUTROPHILS NFR BLD AUTO: 53.7 % — SIGNIFICANT CHANGE UP (ref 43–77)
NRBC # BLD: 0 /100 WBCS — SIGNIFICANT CHANGE UP (ref 0–0)
OPIATES UR-MCNC: NEGATIVE — SIGNIFICANT CHANGE UP
PCP SPEC-MCNC: SIGNIFICANT CHANGE UP
PCP UR-MCNC: NEGATIVE — SIGNIFICANT CHANGE UP
PLAT MORPH BLD: NORMAL — SIGNIFICANT CHANGE UP
PLATELET # BLD AUTO: 84 K/UL — LOW (ref 150–400)
POTASSIUM SERPL-MCNC: 3.6 MMOL/L — SIGNIFICANT CHANGE UP (ref 3.5–5.3)
POTASSIUM SERPL-SCNC: 3.6 MMOL/L — SIGNIFICANT CHANGE UP (ref 3.5–5.3)
RBC # BLD: 4.02 M/UL — LOW (ref 4.2–5.8)
RBC # FLD: 17.2 % — HIGH (ref 10.3–14.5)
RBC BLD AUTO: NORMAL — SIGNIFICANT CHANGE UP
SALICYLATES SERPL-MCNC: <1.7 MG/DL — LOW (ref 2.8–20)
SODIUM SERPL-SCNC: 143 MMOL/L — SIGNIFICANT CHANGE UP (ref 135–145)
THC UR QL: NEGATIVE — SIGNIFICANT CHANGE UP
TROPONIN I, HIGH SENSITIVITY RESULT: 36.3 NG/L — SIGNIFICANT CHANGE UP
WBC # BLD: 3.81 K/UL — SIGNIFICANT CHANGE UP (ref 3.8–10.5)
WBC # FLD AUTO: 3.81 K/UL — SIGNIFICANT CHANGE UP (ref 3.8–10.5)

## 2023-10-08 PROCEDURE — 70450 CT HEAD/BRAIN W/O DYE: CPT | Mod: 26,MG

## 2023-10-08 PROCEDURE — 99221 1ST HOSP IP/OBS SF/LOW 40: CPT

## 2023-10-08 PROCEDURE — 93010 ELECTROCARDIOGRAM REPORT: CPT

## 2023-10-08 PROCEDURE — G1004: CPT

## 2023-10-08 PROCEDURE — 71045 X-RAY EXAM CHEST 1 VIEW: CPT | Mod: 26

## 2023-10-08 PROCEDURE — 72125 CT NECK SPINE W/O DYE: CPT | Mod: 26,MG

## 2023-10-08 RX ORDER — KETOROLAC TROMETHAMINE 30 MG/ML
15 SYRINGE (ML) INJECTION ONCE
Refills: 0 | Status: DISCONTINUED | OUTPATIENT
Start: 2023-10-08 | End: 2023-10-08

## 2023-10-08 RX ORDER — AMLODIPINE BESYLATE 2.5 MG/1
5 TABLET ORAL DAILY
Refills: 0 | Status: DISCONTINUED | OUTPATIENT
Start: 2023-10-08 | End: 2023-10-11

## 2023-10-08 RX ORDER — SODIUM CHLORIDE 9 MG/ML
1000 INJECTION, SOLUTION INTRAVENOUS
Refills: 0 | Status: DISCONTINUED | OUTPATIENT
Start: 2023-10-08 | End: 2023-10-11

## 2023-10-08 RX ORDER — ACETAMINOPHEN 500 MG
650 TABLET ORAL EVERY 6 HOURS
Refills: 0 | Status: DISCONTINUED | OUTPATIENT
Start: 2023-10-08 | End: 2023-10-11

## 2023-10-08 RX ORDER — INFLUENZA VIRUS VACCINE 15; 15; 15; 15 UG/.5ML; UG/.5ML; UG/.5ML; UG/.5ML
0.5 SUSPENSION INTRAMUSCULAR ONCE
Refills: 0 | Status: DISCONTINUED | OUTPATIENT
Start: 2023-10-08 | End: 2023-10-11

## 2023-10-08 RX ORDER — ENOXAPARIN SODIUM 100 MG/ML
40 INJECTION SUBCUTANEOUS EVERY 24 HOURS
Refills: 0 | Status: DISCONTINUED | OUTPATIENT
Start: 2023-10-08 | End: 2023-10-08

## 2023-10-08 RX ORDER — THIAMINE MONONITRATE (VIT B1) 100 MG
100 TABLET ORAL DAILY
Refills: 0 | Status: COMPLETED | OUTPATIENT
Start: 2023-10-08 | End: 2023-10-10

## 2023-10-08 RX ORDER — LANOLIN ALCOHOL/MO/W.PET/CERES
3 CREAM (GRAM) TOPICAL AT BEDTIME
Refills: 0 | Status: DISCONTINUED | OUTPATIENT
Start: 2023-10-08 | End: 2023-10-11

## 2023-10-08 RX ORDER — LISINOPRIL 2.5 MG/1
20 TABLET ORAL DAILY
Refills: 0 | Status: DISCONTINUED | OUTPATIENT
Start: 2023-10-08 | End: 2023-10-11

## 2023-10-08 RX ORDER — PETROLATUM,WHITE
1 JELLY (GRAM) TOPICAL
Refills: 0 | Status: DISCONTINUED | OUTPATIENT
Start: 2023-10-08 | End: 2023-10-11

## 2023-10-08 RX ORDER — ONDANSETRON 8 MG/1
4 TABLET, FILM COATED ORAL EVERY 8 HOURS
Refills: 0 | Status: DISCONTINUED | OUTPATIENT
Start: 2023-10-08 | End: 2023-10-11

## 2023-10-08 RX ORDER — FOLIC ACID 0.8 MG
1 TABLET ORAL DAILY
Refills: 0 | Status: DISCONTINUED | OUTPATIENT
Start: 2023-10-08 | End: 2023-10-11

## 2023-10-08 RX ORDER — SODIUM CHLORIDE 9 MG/ML
1000 INJECTION INTRAMUSCULAR; INTRAVENOUS; SUBCUTANEOUS ONCE
Refills: 0 | Status: COMPLETED | OUTPATIENT
Start: 2023-10-08 | End: 2023-10-08

## 2023-10-08 RX ORDER — TIOTROPIUM BROMIDE 18 UG/1
2 CAPSULE ORAL; RESPIRATORY (INHALATION) DAILY
Refills: 0 | Status: DISCONTINUED | OUTPATIENT
Start: 2023-10-08 | End: 2023-10-10

## 2023-10-08 RX ADMIN — Medication 15 MILLIGRAM(S): at 04:00

## 2023-10-08 RX ADMIN — Medication 15 MILLIGRAM(S): at 18:55

## 2023-10-08 RX ADMIN — Medication 100 MILLIGRAM(S): at 11:28

## 2023-10-08 RX ADMIN — Medication 650 MILLIGRAM(S): at 22:13

## 2023-10-08 RX ADMIN — AMLODIPINE BESYLATE 5 MILLIGRAM(S): 2.5 TABLET ORAL at 16:12

## 2023-10-08 RX ADMIN — Medication 650 MILLIGRAM(S): at 16:12

## 2023-10-08 RX ADMIN — Medication 1 TABLET(S): at 11:28

## 2023-10-08 RX ADMIN — Medication 2 MILLIGRAM(S): at 22:26

## 2023-10-08 RX ADMIN — SODIUM CHLORIDE 1000 MILLILITER(S): 9 INJECTION INTRAMUSCULAR; INTRAVENOUS; SUBCUTANEOUS at 04:00

## 2023-10-08 RX ADMIN — Medication 2 MILLIGRAM(S): at 12:44

## 2023-10-08 RX ADMIN — LISINOPRIL 20 MILLIGRAM(S): 2.5 TABLET ORAL at 21:57

## 2023-10-08 RX ADMIN — Medication 650 MILLIGRAM(S): at 17:10

## 2023-10-08 RX ADMIN — Medication 650 MILLIGRAM(S): at 23:20

## 2023-10-08 RX ADMIN — TIOTROPIUM BROMIDE 2 PUFF(S): 18 CAPSULE ORAL; RESPIRATORY (INHALATION) at 11:28

## 2023-10-08 RX ADMIN — Medication 1 MILLIGRAM(S): at 11:28

## 2023-10-08 NOTE — H&P ADULT - NSHPLABSRESULTS_GEN_ALL_CORE
11.9   3.81  )-----------( 84       ( 08 Oct 2023 01:07 )             37.6     10-08    143  |  111<H>  |  18  ----------------------------<  69<L>  3.6   |  22  |  1.02    Ca    8.0<L>      08 Oct 2023 01:07        Urinalysis Basic - ( 08 Oct 2023 01:07 )    Color: x / Appearance: x / SG: x / pH: x  Gluc: 69 mg/dL / Ketone: x  / Bili: x / Urobili: x   Blood: x / Protein: x / Nitrite: x   Leuk Esterase: x / RBC: x / WBC x   Sq Epi: x / Non Sq Epi: x / Bacteria: x

## 2023-10-08 NOTE — ED PROVIDER NOTE - PHYSICAL EXAMINATION
General: unkept appearing male in no acute distress  HEENT: Normocephalic, atraumatic. Moist mucous membranes. Oropharynx clear. No lymphadenopathy.  Eyes: No scleral icterus. EOMI. SAVANNA.  Neck:. Soft and supple. Full ROM without pain. No midline tenderness  Cardiac: Regular rate and regular rhythm. No murmurs, rubs, gallops. Peripheral pulses 2+ and symmetric. No LE edema.  Resp: Lungs CTAB. Speaking in full sentences. No wheezes, rales or rhonchi.  Abd: Soft, non-tender, non-distended. No guarding or rebound. No scars, masses, or lesions.  Back: Spine midline and non-tender. No CVA tenderness.    Skin: No rashes, abrasions, or lacerations.  Neuro: AO x 3. Moves all extremities symmetrically. Motor strength and sensation grossly intact.

## 2023-10-08 NOTE — H&P ADULT - ASSESSMENT
42M w/ hx of asthma, HTN, EtOH abuse p/w AMS.     #Alcohol intoxication  - IV LR  - continue folate    #FEN/PPX  - NPO  - lovenox for DVT ppx 42M w/ hx of asthma, HTN, EtOH abuse p/w AMS.     #Abnormal EKG  - pt with anterolateral TWI on prior EKGs likely associated w/ drug use    #Alcohol intoxication  - IV LR  - continue folate    #FEN/PPX  - NPO  - lovenox for DVT ppx

## 2023-10-08 NOTE — PATIENT PROFILE ADULT - FALL HARM RISK - RISK INTERVENTIONS
Assistance OOB with selected safe patient handling equipment/Assistance with ambulation/Communicate Fall Risk and Risk Factors to all staff, patient, and family/Discuss with provider need for PT consult/Monitor for mental status changes/Monitor gait and stability/Provide patient with walking aids - walker, cane, crutches/Reinforce activity limits and safety measures with patient and family/Reorient to person, place and time as needed/Review medications for side effects contributing to fall risk/Toileting schedule using arm’s reach rule for commode and bathroom/Use of alarms - bed, chair and/or voice tab/Visual Cue: Yellow wristband/Bed in lowest position, wheels locked, appropriate side rails in place/Call bell, personal items and telephone in reach/Instruct patient to call for assistance before getting out of bed or chair/Non-slip footwear when patient is out of bed/[place] to call system/Physically safe environment - no spills, clutter or unnecessary equipment/Purposeful Proactive Rounding/Room/bathroom lighting operational, light cord in reach

## 2023-10-08 NOTE — H&P ADULT - NSHPPHYSICALEXAM_GEN_ALL_CORE
T(C): 36.4 (10-07-23 @ 22:38), Max: 36.4 (10-07-23 @ 22:38)  HR: 80 (10-08-23 @ 02:55) (73 - 80)  BP: 113/68 (10-08-23 @ 02:55) (113/68 - 121/79)  RR: 20 (10-08-23 @ 02:55) (17 - 20)  SpO2: 99% (10-08-23 @ 02:55) (97% - 99%)    CONSTITUTIONAL: Well groomed, no apparent distress  EYES: PERRLA and symmetric, EOMI, No conjunctival or scleral injection, non-icteric  ENMT: Oral mucosa with moist membranes. Normal dentition; no pharyngeal injection or exudates             NECK: Supple, symmetric and without tracheal deviation   RESP: No respiratory distress, no use of accessory muscles; CTA b/l, no WRR  CV: RRR, +S1S2, no MRG; no JVD; no peripheral edema  GI: Soft, NT, ND, no rebound, no guarding; no palpable masses; no hepatosplenomegaly; no hernia palpated  LYMPH: No cervical LAD or tenderness; no axillary LAD or tenderness; no inguinal LAD or tenderness  MSK: Normal gait; No digital clubbing or cyanosis; examination of the (head/neck/spine/ribs/pelvis, RUE, LUE, RLE, LLE) without misalignment,            Normal ROM without pain, no spinal tenderness, normal muscle strength/tone  SKIN: No rashes or ulcers noted; no subcutaneous nodules or induration palpable  NEURO: CN II-XII intact; normal reflexes in upper and lower extremities, sensation intact in upper and lower extremities b/l to light touch   PSYCH: Appropriate insight/judgment; A+O x 1, mood and affect appropriate, recent/remote memory intact

## 2023-10-08 NOTE — ED PROVIDER NOTE - CLINICAL SUMMARY MEDICAL DECISION MAKING FREE TEXT BOX
42-year-old male history of asthma, alcohol abuse brought in by EMS from the street for possible polysubstance abuse.  Patient endorsing at triage that he used marijuana, heroin, alcohol, cocaine.  When asked patient states he did everything.  Denies any acute complaints currently.  Unable to obtain further history due to patient being clinically intoxicated and lack of patient cooperation.  He does deny any trauma.  Clinically patient appears intoxicated, responsive to painful and verbal stimuli.  Opens eyes when prompted to do so.  No signs of external trauma.  Abdomen is benign.  Placed on cardiac monitor, fingerstick obtained within normal limits.  Will get EKG, CT head given polysubstance abuse history and found on street. will reassess and monitor on cardiac monitor for clinical sobriety. 42-year-old male history of asthma, alcohol abuse brought in by EMS from the street for possible polysubstance abuse.  Patient endorsing at triage that he used marijuana, heroin, alcohol, cocaine.  When asked patient states he did everything.  Denies any acute complaints currently.  Unable to obtain further history due to patient being clinically intoxicated and lack of patient cooperation.  He does deny any trauma.  Clinically patient appears intoxicated, responsive to painful and verbal stimuli.  Opens eyes when prompted to do so.  No signs of external trauma.  Abdomen is benign.  Placed on cardiac monitor, fingerstick obtained within normal limits.  Will get EKG, CT head given polysubstance abuse history and found on street. will reassess and monitor on cardiac monitor for clinical sobriety.  patient endorsing chest pain, trop wnl. well appearing. saturating high 80s on room air, placed on 2L nasal cannula.

## 2023-10-08 NOTE — ED PROVIDER NOTE - OBJECTIVE STATEMENT
42-year-old male history of asthma, alcohol abuse brought in by EMS from the street for possible polysubstance abuse.  Patient endorsing at triage that he used marijuana, heroin, alcohol, cocaine.  When asked patient states he did everything.  Denies any acute complaints currently.  Unable to obtain further history due to patient being clinically intoxicated and lack of patient cooperation.  He does deny any trauma.

## 2023-10-08 NOTE — ED ADULT NURSE REASSESSMENT NOTE - NS ED NURSE REASSESS COMMENT FT1
unable to obtain new IV, Hospitalist made aware. unable to give IV Ativan, hospitalist switched to IM. Hospitalist aware pt has no IV.

## 2023-10-08 NOTE — ED ADULT NURSE REASSESSMENT NOTE - NS ED NURSE REASSESS COMMENT FT1
patient received sleeping in stretcher, arousable to voice. pt in no acute respiratory distress or discomfort at this time. on continuous cardiac monitor and pulse ox. pt admitted, no available beds.

## 2023-10-08 NOTE — ED ADULT NURSE REASSESSMENT NOTE - NS ED NURSE REASSESS COMMENT FT1
pt alert awake and responsive, RN reoriented to where he is and what had happened. pt NPO and educated, but pt grabbed juice and food from his bag and started eating then proceeded to stick his tongue out  and states "he's not a prisoner". pt on continuous cardiac monitor and pulse ox. in no acute respiratory distress or discomfort. pt not complaint with listening. pt fall risk.

## 2023-10-08 NOTE — H&P ADULT - HISTORY OF PRESENT ILLNESS
42M w/ hx of asthma, HTN, EtOH abuse p/w AMS. Pt endorses abdominal discomfort. Denies CP, palpitations, fever/chills/sweats.    In ED, pt afebrile and HDS. Labs notable for EtOH 360, UDS +benzos 42M w/ hx of asthma, HTN, EtOH abuse p/w AMS. Pt endorses abdominal discomfort. Denies CP, palpitations, fever/chills/sweats.    In ED, pt afebrile and HDS. Labs notable for EtOH 360, UDS +benzos. EKG w/ anterolateral TWI

## 2023-10-09 LAB
ALBUMIN SERPL ELPH-MCNC: 3 G/DL — LOW (ref 3.3–5)
ALP SERPL-CCNC: 127 U/L — HIGH (ref 40–120)
ALT FLD-CCNC: 35 U/L — SIGNIFICANT CHANGE UP (ref 12–78)
ANION GAP SERPL CALC-SCNC: 6 MMOL/L — SIGNIFICANT CHANGE UP (ref 5–17)
AST SERPL-CCNC: 53 U/L — HIGH (ref 15–37)
BILIRUB SERPL-MCNC: 1 MG/DL — SIGNIFICANT CHANGE UP (ref 0.2–1.2)
BUN SERPL-MCNC: 13 MG/DL — SIGNIFICANT CHANGE UP (ref 7–23)
CALCIUM SERPL-MCNC: 8.3 MG/DL — LOW (ref 8.5–10.1)
CHLORIDE SERPL-SCNC: 105 MMOL/L — SIGNIFICANT CHANGE UP (ref 96–108)
CO2 SERPL-SCNC: 26 MMOL/L — SIGNIFICANT CHANGE UP (ref 22–31)
CREAT SERPL-MCNC: 0.81 MG/DL — SIGNIFICANT CHANGE UP (ref 0.5–1.3)
EGFR: 114 ML/MIN/1.73M2 — SIGNIFICANT CHANGE UP
GLUCOSE SERPL-MCNC: 137 MG/DL — HIGH (ref 70–99)
HCT VFR BLD CALC: 35.2 % — LOW (ref 39–50)
HGB BLD-MCNC: 12 G/DL — LOW (ref 13–17)
MAGNESIUM SERPL-MCNC: 1.6 MG/DL — SIGNIFICANT CHANGE UP (ref 1.6–2.6)
MCHC RBC-ENTMCNC: 30.8 PG — SIGNIFICANT CHANGE UP (ref 27–34)
MCHC RBC-ENTMCNC: 34.1 G/DL — SIGNIFICANT CHANGE UP (ref 32–36)
MCV RBC AUTO: 90.3 FL — SIGNIFICANT CHANGE UP (ref 80–100)
NRBC # BLD: 0 /100 WBCS — SIGNIFICANT CHANGE UP (ref 0–0)
PHOSPHATE SERPL-MCNC: 2.9 MG/DL — SIGNIFICANT CHANGE UP (ref 2.5–4.5)
PLATELET # BLD AUTO: 104 K/UL — LOW (ref 150–400)
POTASSIUM SERPL-MCNC: 3.1 MMOL/L — LOW (ref 3.5–5.3)
POTASSIUM SERPL-SCNC: 3.1 MMOL/L — LOW (ref 3.5–5.3)
PROT SERPL-MCNC: 7 GM/DL — SIGNIFICANT CHANGE UP (ref 6–8.3)
RBC # BLD: 3.9 M/UL — LOW (ref 4.2–5.8)
RBC # FLD: 16.5 % — HIGH (ref 10.3–14.5)
SODIUM SERPL-SCNC: 137 MMOL/L — SIGNIFICANT CHANGE UP (ref 135–145)
WBC # BLD: 3.77 K/UL — LOW (ref 3.8–10.5)
WBC # FLD AUTO: 3.77 K/UL — LOW (ref 3.8–10.5)

## 2023-10-09 PROCEDURE — 99232 SBSQ HOSP IP/OBS MODERATE 35: CPT

## 2023-10-09 RX ORDER — POTASSIUM CHLORIDE 20 MEQ
40 PACKET (EA) ORAL ONCE
Refills: 0 | Status: COMPLETED | OUTPATIENT
Start: 2023-10-09 | End: 2023-10-10

## 2023-10-09 RX ADMIN — Medication 2 MILLIGRAM(S): at 06:22

## 2023-10-09 RX ADMIN — AMLODIPINE BESYLATE 5 MILLIGRAM(S): 2.5 TABLET ORAL at 06:22

## 2023-10-09 RX ADMIN — Medication 2 MILLIGRAM(S): at 18:11

## 2023-10-09 RX ADMIN — Medication 1 MILLIGRAM(S): at 12:05

## 2023-10-09 RX ADMIN — Medication 1.5 MILLIGRAM(S): at 22:59

## 2023-10-09 RX ADMIN — Medication 100 MILLIGRAM(S): at 12:05

## 2023-10-09 RX ADMIN — Medication 2 MILLIGRAM(S): at 02:42

## 2023-10-09 RX ADMIN — Medication 2 MILLIGRAM(S): at 10:27

## 2023-10-09 RX ADMIN — LISINOPRIL 20 MILLIGRAM(S): 2.5 TABLET ORAL at 06:22

## 2023-10-09 RX ADMIN — Medication 1 TABLET(S): at 12:05

## 2023-10-09 RX ADMIN — Medication 1 APPLICATION(S): at 22:59

## 2023-10-09 NOTE — PROGRESS NOTE ADULT - ASSESSMENT
42M w/ hx of asthma, HTN, EtOH abuse, psoriasis admitted to [facility] for intoxication    PSYCHIATRY  # hx of etoh abuse  # Alcohol intoxication  - IV LR  - continue folate  - monitor for withdrawal. symptom to treat with ativan  - unclear mental baseline. watch overnight    PULMNOLOGY  # asthma  - c/w tiotropium    RHEUMATOLOGY  # psoriasis  - topical steroids. day # 1/7    CARDIOLOGY  # Abnormal EKG  - pt with anterolateral TWI on prior EKGs likely associated w/ drug use  - outpt follow up    MUSCLESKELETAL  # back pain  - CT t- spine    PLAN  - c/w level of care  - CT back  - possible DC in the am    #FEN/PPX  - NPO  - lovenox for DVT ppx   42M w/ hx of asthma, HTN, EtOH abuse, psoriasis admitted to [facility] for intoxication    PSYCHIATRY  # hx of etoh abuse  # Alcohol intoxication  - IV LR  - continue folate  - monitor for withdrawal. symptom to treat with ativan  - unclear mental baseline. watch overnight  - etoh level 374    PULMNOLOGY  # asthma  - c/w tiotropium    RHEUMATOLOGY  # psoriasis  - topical steroids. day # 1/7    CARDIOLOGY  # Abnormal EKG  - pt with anterolateral TWI on prior EKGs likely associated w/ drug use  - outpt follow up    MUSCLESKELETAL  # back pain  - CT t- spine    PLAN  - c/w level of care  - CT back  - possible DC in the am    #FEN/PPX  - NPO  - lovenox for DVT ppx   42M w/ hx of asthma, HTN, EtOH abuse, psoriasis admitted to [facility] for intoxication    PSYCHIATRY  # hx of etoh abuse  # Alcohol intoxication  - IV LR  - continue folate  - monitor for withdrawal.   - on ativan taper  - unclear mental baseline. watch overnight  - etoh level 374    PULMNOLOGY  # asthma  - c/w tiotropium    RHEUMATOLOGY  # psoriasis  - topical steroids. day # 1/7    CARDIOLOGY  # Abnormal EKG  - pt with anterolateral TWI on prior EKGs likely associated w/ drug use  - outpt follow up    MUSCLESKELETAL  # back pain  - CT t- spine    PLAN  - c/w level of care  - CT back  - c/w ativan taper  - possible DC in the am  - advance diet as tolerated     #FEN/PPX  - lovenox for DVT ppx

## 2023-10-10 PROCEDURE — 72128 CT CHEST SPINE W/O DYE: CPT | Mod: 26

## 2023-10-10 PROCEDURE — 72190 X-RAY EXAM OF PELVIS: CPT | Mod: 26

## 2023-10-10 PROCEDURE — 99232 SBSQ HOSP IP/OBS MODERATE 35: CPT

## 2023-10-10 RX ORDER — BUDESONIDE AND FORMOTEROL FUMARATE DIHYDRATE 160; 4.5 UG/1; UG/1
2 AEROSOL RESPIRATORY (INHALATION)
Refills: 0 | Status: DISCONTINUED | OUTPATIENT
Start: 2023-10-10 | End: 2023-10-11

## 2023-10-10 RX ORDER — BUDESONIDE AND FORMOTEROL FUMARATE DIHYDRATE 160; 4.5 UG/1; UG/1
2 AEROSOL RESPIRATORY (INHALATION)
Refills: 0 | Status: DISCONTINUED | OUTPATIENT
Start: 2023-10-10 | End: 2023-10-10

## 2023-10-10 RX ADMIN — Medication 1.5 MILLIGRAM(S): at 09:23

## 2023-10-10 RX ADMIN — Medication 1.5 MILLIGRAM(S): at 02:02

## 2023-10-10 RX ADMIN — BUDESONIDE AND FORMOTEROL FUMARATE DIHYDRATE 2 PUFF(S): 160; 4.5 AEROSOL RESPIRATORY (INHALATION) at 17:11

## 2023-10-10 RX ADMIN — AMLODIPINE BESYLATE 5 MILLIGRAM(S): 2.5 TABLET ORAL at 06:39

## 2023-10-10 RX ADMIN — Medication 1.5 MILLIGRAM(S): at 13:25

## 2023-10-10 RX ADMIN — Medication 1 APPLICATION(S): at 17:11

## 2023-10-10 RX ADMIN — Medication 1.5 MILLIGRAM(S): at 06:40

## 2023-10-10 RX ADMIN — Medication 1 MILLIGRAM(S): at 12:53

## 2023-10-10 RX ADMIN — Medication 100 MILLIGRAM(S): at 12:53

## 2023-10-10 RX ADMIN — LISINOPRIL 20 MILLIGRAM(S): 2.5 TABLET ORAL at 06:39

## 2023-10-10 RX ADMIN — Medication 1 TABLET(S): at 12:53

## 2023-10-10 RX ADMIN — Medication 40 MILLIEQUIVALENT(S): at 02:00

## 2023-10-10 RX ADMIN — Medication 1.5 MILLIGRAM(S): at 17:12

## 2023-10-10 RX ADMIN — Medication 1 APPLICATION(S): at 06:40

## 2023-10-10 NOTE — PROGRESS NOTE ADULT - SUBJECTIVE AND OBJECTIVE BOX
Patient is a 42y old  Male who presents with a chief complaint of     INTERVAL HPI/OVERNIGHT EVENTS: no acute events  SUBJECTIVE: reports shortness of breathe, swelling of the lower extremity, upper back pain    MEDICATIONS  (STANDING):  amLODIPine   Tablet 5 milliGRAM(s) Oral daily  clobetasol 0.05% Cream 1 Application(s) Topical two times a day  folic acid 1 milliGRAM(s) Oral daily  influenza   Vaccine 0.5 milliLiter(s) IntraMuscular once  lactated ringers. 1000 milliLiter(s) (100 mL/Hr) IV Continuous <Continuous>  lisinopril 20 milliGRAM(s) Oral daily  LORazepam   Injectable   IntraMuscular   LORazepam   Injectable 1.5 milliGRAM(s) IntraMuscular every 4 hours  multivitamin 1 Tablet(s) Oral daily  thiamine 100 milliGRAM(s) Oral daily  tiotropium 2.5 MICROgram(s) Inhaler 2 Puff(s) Inhalation daily    MEDICATIONS  (PRN):  acetaminophen     Tablet .. 650 milliGRAM(s) Oral every 6 hours PRN Temp greater or equal to 38C (100.4F), Mild Pain (1 - 3)  aluminum hydroxide/magnesium hydroxide/simethicone Suspension 30 milliLiter(s) Oral every 4 hours PRN Dyspepsia  AQUAPHOR (petrolatum Ointment) 1 Application(s) Topical four times a day PRN dry skin  LORazepam   Injectable 2 milliGRAM(s) IntraMuscular every 1 hour PRN Symptom-triggered: each CIWA -Ar score 8 or GREATER  melatonin 3 milliGRAM(s) Oral at bedtime PRN Insomnia  ondansetron Injectable 4 milliGRAM(s) IV Push every 8 hours PRN Nausea and/or Vomiting    Allergies    shellfish (Angioedema)  No Known Drug Allergies    Intolerances      REVIEW OF SYSTEMS:  All other systems reviewed and are negative    Vital Signs Last 24 Hrs  T(C): 36.7 (09 Oct 2023 11:00), Max: 37 (09 Oct 2023 08:00)  T(F): 98 (09 Oct 2023 11:00), Max: 98.6 (09 Oct 2023 08:00)  HR: 99 (09 Oct 2023 18:15) (78 - 112)  BP: 139/92 (09 Oct 2023 18:15) (135/84 - 167/100)  BP(mean): --  RR: 19 (09 Oct 2023 11:00) (17 - 20)  SpO2: 96% (09 Oct 2023 11:00) (96% - 100%)    Parameters below as of 09 Oct 2023 11:00  Patient On (Oxygen Delivery Method): room air      Daily     Daily   I&O's Summary    08 Oct 2023 07:01  -  09 Oct 2023 07:00  --------------------------------------------------------  IN: 0 mL / OUT: 700 mL / NET: -700 mL      CAPILLARY BLOOD GLUCOSE        PHYSICAL EXAM:  GENERAL: NAD, well-groomed, well-developed  HEAD:  Atraumatic, Normocephalic  NECK: Supple, No JVD, Normal thyroid  NERVOUS SYSTEM:  Alert & Oriented X3, poor concentration  CHEST/LUNG: Clear to percussion bilaterally; No rales, rhonchi, wheezing, or rubs  HEART: Regular rate and rhythm; No murmurs, rubs, or gallops  ABDOMEN: Soft, Nontender, Nondistended; Bowel sounds present  EXTREMITIES:  1+ BLE edema    Labs                          12.0   3.77  )-----------( 104      ( 09 Oct 2023 10:45 )             35.2     10-09    137  |  105  |  13  ----------------------------<  137<H>  3.1<L>   |  26  |  0.81    Ca    8.3<L>      09 Oct 2023 10:45  Phos  2.9     10-09  Mg     1.6     10-09    TPro  7.0  /  Alb  3.0<L>  /  TBili  1.0  /  DBili  x   /  AST  53<H>  /  ALT  35  /  AlkPhos  127<H>  10-09          Urinalysis Basic - ( 09 Oct 2023 10:45 )    Color: x / Appearance: x / SG: x / pH: x  Gluc: 137 mg/dL / Ketone: x  / Bili: x / Urobili: x   Blood: x / Protein: x / Nitrite: x   Leuk Esterase: x / RBC: x / WBC x   Sq Epi: x / Non Sq Epi: x / Bacteria: x            
Patient is a 42y old  Male who presents with a chief complaint of     INTERVAL HPI/OVERNIGHT EVENTS: Pt doing well - CIWA-0, AOx4, states that has pelvic pain on right side- thinks that fell when drunk.     MEDICATIONS  (STANDING):  amLODIPine   Tablet 5 milliGRAM(s) Oral daily  budesonide 160 MICROgram(s)/formoterol 4.5 MICROgram(s) Inhaler 2 Puff(s) Inhalation two times a day  clobetasol 0.05% Cream 1 Application(s) Topical two times a day  folic acid 1 milliGRAM(s) Oral daily  influenza   Vaccine 0.5 milliLiter(s) IntraMuscular once  lactated ringers. 1000 milliLiter(s) (100 mL/Hr) IV Continuous <Continuous>  lisinopril 20 milliGRAM(s) Oral daily  LORazepam   Injectable   IntraMuscular   LORazepam   Injectable 1 milliGRAM(s) IntraMuscular every 4 hours  LORazepam   Injectable 1.5 milliGRAM(s) IntraMuscular every 4 hours  multivitamin 1 Tablet(s) Oral daily    MEDICATIONS  (PRN):  acetaminophen     Tablet .. 650 milliGRAM(s) Oral every 6 hours PRN Temp greater or equal to 38C (100.4F), Mild Pain (1 - 3)  aluminum hydroxide/magnesium hydroxide/simethicone Suspension 30 milliLiter(s) Oral every 4 hours PRN Dyspepsia  AQUAPHOR (petrolatum Ointment) 1 Application(s) Topical four times a day PRN dry skin  LORazepam   Injectable 2 milliGRAM(s) IntraMuscular every 1 hour PRN Symptom-triggered: each CIWA -Ar score 8 or GREATER  melatonin 3 milliGRAM(s) Oral at bedtime PRN Insomnia  ondansetron Injectable 4 milliGRAM(s) IV Push every 8 hours PRN Nausea and/or Vomiting      Allergies    shellfish (Angioedema)  No Known Drug Allergies    Intolerances        REVIEW OF SYSTEMS:  CONSTITUTIONAL: No fever, weight loss, or fatigue  EYES: No eye pain, visual disturbances, or discharge  ENMT:  No difficulty hearing, tinnitus, vertigo; No sinus or throat pain  NECK: No pain or stiffness  BREASTS: No pain, masses, or nipple discharge  RESPIRATORY: No cough, wheezing, chills or hemoptysis; No shortness of breath  CARDIOVASCULAR: No chest pain, palpitations, dizziness, or leg swelling  GASTROINTESTINAL: No abdominal or epigastric pain. No nausea, vomiting, or hematemesis; No diarrhea or constipation. No melena or hematochezia.  GENITOURINARY: No dysuria, frequency, hematuria, or incontinence  NEUROLOGICAL: No headaches, memory loss, loss of strength, numbness, or tremors  SKIN: No itching, burning, rashes, or lesions   LYMPH NODES: No enlarged glands  ENDOCRINE: No heat or cold intolerance; No hair loss  MUSCULOSKELETAL: No joint pain or swelling; No muscle, back, or extremity pain  PSYCHIATRIC: No depression, anxiety, mood swings, or difficulty sleeping  HEME/LYMPH: No easy bruising, or bleeding gums  ALLERGY AND IMMUNOLOGIC: No hives or eczema    Vital Signs Last 24 Hrs  T(C): 37.1 (10 Oct 2023 11:12), Max: 37.1 (10 Oct 2023 11:12)  T(F): 98.8 (10 Oct 2023 11:12), Max: 98.8 (10 Oct 2023 11:12)  HR: 90 (10 Oct 2023 11:12) (70 - 99)  BP: 136/73 (10 Oct 2023 11:12) (128/74 - 151/95)  BP(mean): --  RR: 17 (10 Oct 2023 11:12) (17 - 20)  SpO2: 97% (10 Oct 2023 11:12) (96% - 98%)    Parameters below as of 10 Oct 2023 11:12  Patient On (Oxygen Delivery Method): room air        PHYSICAL EXAM:  GENERAL: NAD, well-groomed, well-developed  HEAD:  Atraumatic, Normocephalic  EYES: EOMI, PERRLA, conjunctiva and sclera clear  ENMT: No tonsillar erythema, exudates, or enlargement; Moist mucous membranes, Good dentition, No lesions  NECK: Supple, No JVD, Normal thyroid  NERVOUS SYSTEM:  Alert & Oriented X3, Good concentration; Motor Strength 5/5 B/L upper and lower extremities; DTRs 2+ intact and symmetric  CHEST/LUNG: Clear to percussion bilaterally; No rales, rhonchi, wheezing, or rubs  HEART: Regular rate and rhythm; No murmurs, rubs, or gallops  ABDOMEN: Soft, Nontender, Nondistended; Bowel sounds present  EXTREMITIES:  2+ Peripheral Pulses, No clubbing, cyanosis, or edema  SKIN: No rashes or lesions but very dry skin     LABS:                        12.0   3.77  )-----------( 104      ( 09 Oct 2023 10:45 )             35.2     10-09    137  |  105  |  13  ----------------------------<  137<H>  3.1<L>   |  26  |  0.81    Ca    8.3<L>      09 Oct 2023 10:45  Phos  2.9     10-09  Mg     1.6     10-09    TPro  7.0  /  Alb  3.0<L>  /  TBili  1.0  /  DBili  x   /  AST  53<H>  /  ALT  35  /  AlkPhos  127<H>  10-09      Urinalysis Basic - ( 09 Oct 2023 10:45 )    Color: x / Appearance: x / SG: x / pH: x  Gluc: 137 mg/dL / Ketone: x  / Bili: x / Urobili: x   Blood: x / Protein: x / Nitrite: x   Leuk Esterase: x / RBC: x / WBC x   Sq Epi: x / Non Sq Epi: x / Bacteria: x      CAPILLARY BLOOD GLUCOSE          RADIOLOGY & ADDITIONAL TESTS:    Imaging Personally Reviewed:  [ ] YES  [ ] NO    Consultant(s) Notes Reviewed:  [ ] YES  [ ] NO    Care Discussed with Consultants/Other Providers [ ] YES  [ ] NO

## 2023-10-10 NOTE — PROGRESS NOTE ADULT - ASSESSMENT
42M w/ hx of asthma, HTN, EtOH abuse, psoriasis admitted to [facility] for intoxication    # hx of etoh abuse  # Alcohol intoxication  - IV LR  - continue folate  - monitor for withdrawal.   - on ativan tape      # asthma  - c/w tiotropium    # psoriasis  - topical steroids.    # Abnormal EKG  - pt with anterolateral TWI on prior EKGs likely associated w/ drug use  - outpt follow up    # back pain  #pelvic pain  - CT c- spine-No acute findings.  CT of lumbar spine- pending  -pelvic xray- pending  -pain meds prn     PLAN  - possibly dc tomorrow, spoke with     #FEN/PPX  - lovenox for DVT ppx

## 2023-10-11 ENCOUNTER — TRANSCRIPTION ENCOUNTER (OUTPATIENT)
Age: 43
End: 2023-10-11

## 2023-10-11 VITALS
SYSTOLIC BLOOD PRESSURE: 133 MMHG | OXYGEN SATURATION: 99 % | RESPIRATION RATE: 17 BRPM | HEART RATE: 99 BPM | DIASTOLIC BLOOD PRESSURE: 85 MMHG | TEMPERATURE: 98 F

## 2023-10-11 LAB
ANION GAP SERPL CALC-SCNC: 7 MMOL/L — SIGNIFICANT CHANGE UP (ref 5–17)
BUN SERPL-MCNC: 16 MG/DL — SIGNIFICANT CHANGE UP (ref 7–23)
CALCIUM SERPL-MCNC: 8.7 MG/DL — SIGNIFICANT CHANGE UP (ref 8.5–10.1)
CHLORIDE SERPL-SCNC: 106 MMOL/L — SIGNIFICANT CHANGE UP (ref 96–108)
CO2 SERPL-SCNC: 23 MMOL/L — SIGNIFICANT CHANGE UP (ref 22–31)
CREAT SERPL-MCNC: 0.74 MG/DL — SIGNIFICANT CHANGE UP (ref 0.5–1.3)
EGFR: 117 ML/MIN/1.73M2 — SIGNIFICANT CHANGE UP
GLUCOSE SERPL-MCNC: 98 MG/DL — SIGNIFICANT CHANGE UP (ref 70–99)
HCT VFR BLD CALC: 35.8 % — LOW (ref 39–50)
HGB BLD-MCNC: 11.9 G/DL — LOW (ref 13–17)
MAGNESIUM SERPL-MCNC: 1.7 MG/DL — SIGNIFICANT CHANGE UP (ref 1.6–2.6)
MCHC RBC-ENTMCNC: 30.2 PG — SIGNIFICANT CHANGE UP (ref 27–34)
MCHC RBC-ENTMCNC: 33.2 G/DL — SIGNIFICANT CHANGE UP (ref 32–36)
MCV RBC AUTO: 90.9 FL — SIGNIFICANT CHANGE UP (ref 80–100)
NRBC # BLD: 0 /100 WBCS — SIGNIFICANT CHANGE UP (ref 0–0)
PLATELET # BLD AUTO: 109 K/UL — LOW (ref 150–400)
POTASSIUM SERPL-MCNC: 3.7 MMOL/L — SIGNIFICANT CHANGE UP (ref 3.5–5.3)
POTASSIUM SERPL-SCNC: 3.7 MMOL/L — SIGNIFICANT CHANGE UP (ref 3.5–5.3)
RBC # BLD: 3.94 M/UL — LOW (ref 4.2–5.8)
RBC # FLD: 16 % — HIGH (ref 10.3–14.5)
SODIUM SERPL-SCNC: 136 MMOL/L — SIGNIFICANT CHANGE UP (ref 135–145)
WBC # BLD: 4.67 K/UL — SIGNIFICANT CHANGE UP (ref 3.8–10.5)
WBC # FLD AUTO: 4.67 K/UL — SIGNIFICANT CHANGE UP (ref 3.8–10.5)

## 2023-10-11 PROCEDURE — 99239 HOSP IP/OBS DSCHRG MGMT >30: CPT

## 2023-10-11 RX ORDER — PETROLATUM,WHITE
1 JELLY (GRAM) TOPICAL
Qty: 0 | Refills: 0 | DISCHARGE
Start: 2023-10-11

## 2023-10-11 RX ADMIN — BUDESONIDE AND FORMOTEROL FUMARATE DIHYDRATE 2 PUFF(S): 160; 4.5 AEROSOL RESPIRATORY (INHALATION) at 05:32

## 2023-10-11 RX ADMIN — Medication 1 MILLIGRAM(S): at 05:27

## 2023-10-11 RX ADMIN — LISINOPRIL 20 MILLIGRAM(S): 2.5 TABLET ORAL at 05:24

## 2023-10-11 RX ADMIN — Medication 650 MILLIGRAM(S): at 04:08

## 2023-10-11 RX ADMIN — Medication 1 TABLET(S): at 12:20

## 2023-10-11 RX ADMIN — AMLODIPINE BESYLATE 5 MILLIGRAM(S): 2.5 TABLET ORAL at 05:23

## 2023-10-11 RX ADMIN — Medication 1 APPLICATION(S): at 05:24

## 2023-10-11 RX ADMIN — Medication 1 MILLIGRAM(S): at 12:20

## 2023-10-11 NOTE — DISCHARGE NOTE PROVIDER - NSDCMRMEDTOKEN_GEN_ALL_CORE_FT
amLODIPine 5 mg oral tablet: 1 tab(s) orally once a day  budesonide-formoterol 160 mcg-4.5 mcg/inh inhalation aerosol: 2 puff(s) inhaled 2 times a day   folic acid 1 mg oral tablet: 1 tab(s) orally once a day  lisinopril 20 mg oral tablet: 1 tab(s) orally once a day  Multiple Vitamins oral tablet: 1 tab(s) orally once a day  petrolatum topical ointment: 1 Apply topically to affected area 4 times a day As needed dry skin  predniSONE 20 mg oral tablet: 2 tab(s) orally once a day  Proventil HFA 90 mcg/inh inhalation aerosol: 2 puff(s) inhaled every 6 hours  tiotropium 18 mcg inhalation capsule: 2 puff(s) inhaled once a day   triamcinolone 0.1% topical ointment: 1 application topically every 12 hours

## 2023-10-11 NOTE — DISCHARGE NOTE PROVIDER - ATTENDING DISCHARGE PHYSICAL EXAMINATION:
GENERAL: NAD, well-groomed, well-developed  HEAD:  Atraumatic, Normocephalic  EYES: EOMI, PERRLA, conjunctiva and sclera clear  ENMT: No tonsillar erythema, exudates, or enlargement; Moist mucous membranes, Good dentition, No lesions  NECK: Supple, No JVD, Normal thyroid  NERVOUS SYSTEM:  Alert & Oriented X3, Good concentration; Motor Strength 5/5 B/L upper and lower extremities; DTRs 2+ intact and symmetric  CHEST/LUNG: Clear to percussion bilaterally; No rales, rhonchi, wheezing, or rubs  HEART: Regular rate and rhythm; No murmurs, rubs, or gallops  ABDOMEN: Soft, Nontender, Nondistended; Bowel sounds present  EXTREMITIES:  2+ Peripheral Pulses, No clubbing, cyanosis, or edema  SKIN: No rashes or lesions but very dry skin

## 2023-10-11 NOTE — DISCHARGE NOTE NURSING/CASE MANAGEMENT/SOCIAL WORK - NSDCPEFALRISK_GEN_ALL_CORE
For information on Fall & Injury Prevention, visit: [email or web address] OR  [email or web address] OR  https://www.cdc.gov/steadi/patient.html

## 2023-10-11 NOTE — DISCHARGE NOTE NURSING/CASE MANAGEMENT/SOCIAL WORK - NSDCPEWEB_GEN_ALL_CORE
[facility] for Tobacco Control website --- [email or web address] website --- [email or web address]

## 2023-10-11 NOTE — DISCHARGE NOTE PROVIDER - NSDCFUADDAPPT_GEN_ALL_CORE_FT
APPTS ARE READY TO BE MADE: [X] YES    Best Family or Patient Contact (if needed):    Additional Information about above appointments (if needed):    1:   2:   3:     Other comments or requests:      APPTS ARE READY TO BE MADE: [X] YES    Best Family or Patient Contact (if needed):    Additional Information about above appointments (if needed):    1:   2:   3:     Other comments or requests:   Outreached on (10/11, 10/12, 10/13, 10/16) which have been unsuccessful. Will await call back from patient/caregiver to coordinate follow up care.

## 2023-10-11 NOTE — DISCHARGE NOTE NURSING/CASE MANAGEMENT/SOCIAL WORK - NSDCFUADDAPPT_GEN_ALL_CORE_FT
APPTS ARE READY TO BE MADE: [X] YES    Best Family or Patient Contact (if needed):    Additional Information about above appointments (if needed):    1:   2:   3:     Other comments or requests:

## 2023-10-11 NOTE — DISCHARGE NOTE PROVIDER - HOSPITAL COURSE
42M w/ hx of asthma, HTN, EtOH abuse p/w AMS. Pt endorses abdominal discomfort. Denies CP, palpitations, fever/chills/sweats.    In ED, pt afebrile and HDS. Labs notable for EtOH 360, UDS +benzos. EKG w/ anterolateral TWI    # hx of etoh abuse  # Alcohol intoxication  - Given IV LR, folate while inpatient.   - monitor for withdrawal. CIWA scores 0 and was treated with ativan taper.  - per SBIRT - Patient refusing to quit alcohol     # asthma  - stable, c/w tiotropium    # psoriasis  - stable on topical steroids.    # Abnormal EKG  - pt with anterolateral TWI on prior EKGs likely associated w/ drug use  - will need cardiology outpt follow up    # back pain  #pelvic pain  - Imaging unremarkable, just shows healing subacute fxr on L3

## 2023-10-11 NOTE — DISCHARGE NOTE NURSING/CASE MANAGEMENT/SOCIAL WORK - PATIENT PORTAL LINK FT
You can access the FollowMyHealth Patient Portal offered by [facility] by registering at the following website: [email or web address]. By joining [employer]’s FollowMyHealth portal, you will also be able to view your health information using other applications (apps) compatible with our system.

## 2023-10-11 NOTE — DISCHARGE NOTE PROVIDER - NSFOLLOWUPCLINICS_GEN_ALL_ED_FT
A Cardiologist  Cardiology  .  [place]   Phone:   Fax:   Follow Up Time: 1 week    A Family Medicine Doctor  Family Medicine  .  [place]   Phone:   Fax:   Follow Up Time: 1 week

## 2023-10-11 NOTE — DISCHARGE NOTE NURSING/CASE MANAGEMENT/SOCIAL WORK - NSDCPEEMAIL_GEN_ALL_CORE
[facility] for Tobacco Control email [email or web address]

## 2023-10-11 NOTE — DISCHARGE NOTE PROVIDER - NSDCCPCAREPLAN_GEN_ALL_CORE_FT
PRINCIPAL DISCHARGE DIAGNOSIS  Diagnosis: Alcohol abuse  Assessment and Plan of Treatment: Monitor for withdrawal symptoms and return to ED if worsening. Continue alcohol cessation

## 2023-10-11 NOTE — CHART NOTE - NSCHARTNOTEFT_GEN_A_CORE
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 Mental status now improved  - CTH and Ct spine without acute findings  -Pt at risk of withdrawal. Start symptom triggered CIWA    #Alcohol intoxication  - IV LR  - continue folate and thiamine  -CIWA as above     #Abnormal EKG  - pt with anterolateral TWI on prior EKGs likely associated w/ drug use  - monitor         #FEN/PPX  - DASH diet   - lovenox for DVT ppx
Left (1) message(s) for patient in regards to follow up care with callback information.

## 2023-10-16 DIAGNOSIS — G92.8 OTHER TOXIC ENCEPHALOPATHY: ICD-10-CM

## 2023-10-16 DIAGNOSIS — Z91.013 ALLERGY TO SEAFOOD: ICD-10-CM

## 2023-10-16 DIAGNOSIS — R94.31 ABNORMAL ELECTROCARDIOGRAM [ECG] [EKG]: ICD-10-CM

## 2023-10-16 DIAGNOSIS — F10.120 ALCOHOL ABUSE WITH INTOXICATION, UNCOMPLICATED: ICD-10-CM
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
## 2023-10-16 DIAGNOSIS — Z79.899 OTHER LONG TERM (CURRENT) DRUG THERAPY: ICD-10-CM

## 2023-10-16 DIAGNOSIS — L40.9 PSORIASIS, UNSPECIFIED: ICD-10-CM

## 2023-10-16 DIAGNOSIS — I10 ESSENTIAL (PRIMARY) HYPERTENSION: ICD-10-CM

## 2023-10-16 DIAGNOSIS — F13.90 SEDATIVE, HYPNOTIC, OR ANXIOLYTIC USE, UNSPECIFIED, UNCOMPLICATED: ICD-10-CM

## 2023-10-16 DIAGNOSIS — Z79.52 LONG TERM (CURRENT) USE OF SYSTEMIC STEROIDS: ICD-10-CM

## 2023-10-16 DIAGNOSIS — J45.909 UNSPECIFIED ASTHMA, UNCOMPLICATED: ICD-10-CM

## 2023-10-16 DIAGNOSIS — M54.9 DORSALGIA, UNSPECIFIED: ICD-10-CM

## 2023-12-29 NOTE — ED ADULT TRIAGE NOTE - WEIGHT METHOD
Today you were seen and evaluated in the emergency department for weakness.  Your workup was reassuring.  I do not know exactly what is causing your symptoms.  It is possible that you have a viral illness causing your symptoms.  You should get plenty of rest and drink lots of fluids.  Follow-up with your regular doctor as soon as you are able.  Return to the ER with new or worsening symptoms.  
actual

## 2024-05-28 NOTE — ED ADULT NURSE NOTE - NSFALLRSKINDICATORS_ED_ALL_ED
pad taping for increased tolerance moving forward. Continue to progress as tolerated.  Patient will continue to benefit from skilled PT / OT services to modify and progress therapeutic interventions, analyze and address functional mobility deficits, analyze and address ROM deficits, analyze and address strength deficits, analyze and address soft tissue restrictions, analyze and cue for proper movement patterns, analyze and modify for postural abnormalities, analyze and address imbalance/dizziness, and instruct in home and community integration to address functional deficits and attain remaining goals.    Progress toward goals / Updated goals:  []  See Progress Note/Recertification    Short Term Goals: To be accomplished in 2 treatments:                         1.) The patient will be independent with their HEP consistently (at least 3-7 days) for at least one week in order to eventually transition to a more advanced self maintenance program.-MET  Long Term Goals: To be accomplished in 32 treatments:                         1.) The patient will have at most 5/10 pain in order to better tolerate daily activities, such as but not limited to bathing/getting dressed, ambulation, turning/twisting, lifting/carrying,-progressing  squatting, stair negotiation, etc.                         2.) The patient will be able to ambulate for at least 20min without any assistive device or having to change positions due to pain.-10 minutes                         3.) The patient will improve their FOTO score from 29 to at least 55 to show improvements in functional mobility.\  4.) The patient will improve her AROM knee extension to at least -3 and AROM flexion to at least 125 in order assist with daily activities such as squatting and stair negotiation.  5.) The patient will improve her A/PROM left DF to at least 10/20 in order assist with daily activities such as squatting and stair negotiation.  6.) The patient will be able to  no

## 2024-06-21 NOTE — ED ADULT TRIAGE NOTE - PATIENT ON (OXYGEN DELIVERY METHOD)
Type 2 diabetes mellitus without complication, without long-term current use of insulin (HCC)  Tolerating metformin just 1 tab a day.  Currently well-controlled.  Denies having side effects with low sugars.    - AMB POC HEMOGLOBIN A1C  - AMB POC GLUCOSE BLOOD, BY GLUCOSE MONITORING DEVICE    2. Primary hypertension  Continue Norvasc daily and amlodipine.  Blood pressure is well-controlled.  - amLODIPine (NORVASC) 10 MG tablet; Take 1 tablet by mouth daily  Dispense: 90 tablet; Refill: 1    #3.  Hepatitis C positive.  Given the number for hepatology.  He is to call make an appointment to discuss treatment for hepatitis C positive.    4.  GERD  Stable.  Tolerating at a lower dose of Protonix.        Beau Rodriguez office note has been dictated.  
room air

## 2024-08-23 NOTE — ED ADULT NURSE NOTE - SUICIDE SCREENING QUESTION 1
Recommendations from Dr. Daugherty noted and reviewed with patient. Agrees to Xray and follow up appt. Order place and will forward to scheduling to get follow up visit set up in 6-8 weeks.     Corinna Del Angel RN     No

## 2025-07-31 ENCOUNTER — EMERGENCY (EMERGENCY)
Facility: HOSPITAL | Age: 45
LOS: 0 days | Discharge: HOME | End: 2025-08-01
Attending: STUDENT IN AN ORGANIZED HEALTH CARE EDUCATION/TRAINING PROGRAM
Payer: MEDICAID

## 2025-07-31 VITALS
RESPIRATION RATE: 16 BRPM | WEIGHT: 179.9 LBS | TEMPERATURE: 98 F | DIASTOLIC BLOOD PRESSURE: 83 MMHG | HEART RATE: 96 BPM | HEIGHT: 72 IN | OXYGEN SATURATION: 94 % | SYSTOLIC BLOOD PRESSURE: 124 MMHG
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
## 2025-07-31 DIAGNOSIS — Z04.3 ENCOUNTER FOR EXAMINATION AND OBSERVATION FOLLOWING OTHER ACCIDENT: ICD-10-CM

## 2025-07-31 DIAGNOSIS — F10.90 ALCOHOL USE, UNSPECIFIED, UNCOMPLICATED: ICD-10-CM

## 2025-07-31 PROCEDURE — 99284 EMERGENCY DEPT VISIT MOD MDM: CPT

## 2025-07-31 NOTE — ED ADULT TRIAGE NOTE - CHIEF COMPLAINT QUOTE
Pt states he was drinking fell down the steps, hit his head and LOC. Denies taking blood thinners. Pt also reports swelling and pain to his left leg from eczema.

## 2025-07-31 NOTE — ED ADULT NURSE NOTE - NSFALLRISKINTERV_ED_ALL_ED

## 2025-07-31 NOTE — ED ADULT NURSE NOTE - OBJECTIVE STATEMENT
Pt AAOx4. 44 year old male presents to ED with complaint of falling down steps while intoxicated and hitting head. No neuro deficits noted at this time. Pt denies taking blood thinners. Denies any other symptoms at this time. Respirations equal and unlabored. No acute distress noted at this time.

## 2025-07-31 NOTE — ED ADULT NURSE NOTE - CAS DISCH TRANSFER METHOD
Patient called stating she would jorge to discuss increasing the dosage of Semaglutide,0.25 or 0.5MG/DOS, (Ozempic, 0.25 or 0.5 MG/DOSE,) 2 MG/3ML Solution Pen-injector . Please call patient to discuss.   metrocard given/Transportation service

## 2025-07-31 NOTE — ED ADULT NURSE NOTE - CHIEF COMPLAINT QUOTE
Message sent back to patient via [employer] as follows:  Jesus Mckeon,    I am sorry for the delay in getting back to you.  Are you still having pain and other issues that you had messaged about or was Dr. Burgess able to assist you with this?    Also sorry about your shoes.  However, diabetic shoes will only be covered by Medicare/Medicaid or any insurance for patients who are diabetics.  Unfortunately, you are not diabetic, as evidenced by your normal glycohemoglobin (Hemoglobin A1C) results for the past 3 years.  Therefore, you may want to reach out to whomever originally wrote the order for your shoes about this- it appears that was Dr. Kolton Cummings, who incidentally is now with [facility].   Please call [phone] to follow up with him about this. Let his office staff know that you had been seeing him prior to him being part of [facility].    Please let me know about the other issues.    Thank you,  Dianelys, RN- Nurse to Dr. Marie Carlisle   Pt states he was drinking fell down the steps, hit his head and LOC. Denies taking blood thinners. Pt also reports swelling and pain to his left leg from eczema.

## 2025-08-01 VITALS
DIASTOLIC BLOOD PRESSURE: 92 MMHG | RESPIRATION RATE: 18 BRPM | HEART RATE: 97 BPM | SYSTOLIC BLOOD PRESSURE: 181 MMHG | OXYGEN SATURATION: 97 % | TEMPERATURE: 98 F

## 2025-08-01 VITALS
HEIGHT: 72 IN | RESPIRATION RATE: 18 BRPM | HEART RATE: 100 BPM | SYSTOLIC BLOOD PRESSURE: 187 MMHG | TEMPERATURE: 98 F | WEIGHT: 179.9 LBS | OXYGEN SATURATION: 96 % | DIASTOLIC BLOOD PRESSURE: 123 MMHG

## 2025-08-01 VITALS
HEART RATE: 94 BPM | RESPIRATION RATE: 16 BRPM | DIASTOLIC BLOOD PRESSURE: 76 MMHG | SYSTOLIC BLOOD PRESSURE: 163 MMHG | TEMPERATURE: 99 F | OXYGEN SATURATION: 96 %

## 2025-08-01 DIAGNOSIS — Z59.00 HOMELESSNESS UNSPECIFIED: ICD-10-CM

## 2025-08-01 DIAGNOSIS — F10.24 ALCOHOL DEPENDENCE WITH ALCOHOL-INDUCED MOOD DISORDER: ICD-10-CM
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
## 2025-08-01 DIAGNOSIS — F32.A DEPRESSION, UNSPECIFIED: ICD-10-CM

## 2025-08-01 DIAGNOSIS — L30.9 DERMATITIS, UNSPECIFIED: ICD-10-CM

## 2025-08-01 LAB
ALBUMIN SERPL ELPH-MCNC: 2.3 G/DL — LOW (ref 3.3–5)
ALP SERPL-CCNC: 172 U/L — HIGH (ref 40–120)
ALT FLD-CCNC: 32 U/L — SIGNIFICANT CHANGE UP (ref 12–78)
ANION GAP SERPL CALC-SCNC: 9 MMOL/L — SIGNIFICANT CHANGE UP (ref 5–17)
APAP SERPL-MCNC: < 2 UG/ML (ref 10–30)
AST SERPL-CCNC: 116 U/L — HIGH (ref 15–37)
BASOPHILS # BLD AUTO: 0.05 K/UL — SIGNIFICANT CHANGE UP (ref 0–0.2)
BASOPHILS NFR BLD AUTO: 0.9 % — SIGNIFICANT CHANGE UP (ref 0–2)
BILIRUB SERPL-MCNC: 1.1 MG/DL — SIGNIFICANT CHANGE UP (ref 0.2–1.2)
BUN SERPL-MCNC: 13 MG/DL — SIGNIFICANT CHANGE UP (ref 7–23)
CALCIUM SERPL-MCNC: 8.3 MG/DL — LOW (ref 8.5–10.1)
CHLORIDE SERPL-SCNC: 103 MMOL/L — SIGNIFICANT CHANGE UP (ref 96–108)
CO2 SERPL-SCNC: 24 MMOL/L — SIGNIFICANT CHANGE UP (ref 22–31)
CREAT SERPL-MCNC: 0.94 MG/DL — SIGNIFICANT CHANGE UP (ref 0.5–1.3)
EGFR: 104 ML/MIN/1.73M2 — SIGNIFICANT CHANGE UP
EGFR: 104 ML/MIN/1.73M2 — SIGNIFICANT CHANGE UP
EOSINOPHIL # BLD AUTO: 0.5 K/UL — SIGNIFICANT CHANGE UP (ref 0–0.5)
EOSINOPHIL NFR BLD AUTO: 9.3 % — HIGH (ref 0–6)
ETHANOL SERPL-MCNC: 88 MG/DL — HIGH (ref 0–10)
GLUCOSE SERPL-MCNC: 77 MG/DL — SIGNIFICANT CHANGE UP (ref 70–99)
HCT VFR BLD CALC: 39.7 % — SIGNIFICANT CHANGE UP (ref 39–50)
HGB BLD-MCNC: 12.8 G/DL — LOW (ref 13–17)
IMM GRANULOCYTES NFR BLD AUTO: 1.1 % — HIGH (ref 0–0.9)
LYMPHOCYTES # BLD AUTO: 0.58 K/UL — LOW (ref 1–3.3)
LYMPHOCYTES # BLD AUTO: 10.8 % — LOW (ref 13–44)
MCHC RBC-ENTMCNC: 29.5 PG — SIGNIFICANT CHANGE UP (ref 27–34)
MCHC RBC-ENTMCNC: 32.2 G/DL — SIGNIFICANT CHANGE UP (ref 32–36)
MCV RBC AUTO: 91.5 FL — SIGNIFICANT CHANGE UP (ref 80–100)
MONOCYTES # BLD AUTO: 0.55 K/UL — SIGNIFICANT CHANGE UP (ref 0–0.9)
MONOCYTES NFR BLD AUTO: 10.3 % — SIGNIFICANT CHANGE UP (ref 2–14)
NEUTROPHILS # BLD AUTO: 3.61 K/UL — SIGNIFICANT CHANGE UP (ref 1.8–7.4)
NEUTROPHILS NFR BLD AUTO: 67.6 % — SIGNIFICANT CHANGE UP (ref 43–77)
NRBC BLD AUTO-RTO: 0 /100 WBCS — SIGNIFICANT CHANGE UP (ref 0–0)
PLATELET # BLD AUTO: 145 K/UL — LOW (ref 150–400)
POTASSIUM SERPL-MCNC: 4.7 MMOL/L — SIGNIFICANT CHANGE UP (ref 3.5–5.3)
POTASSIUM SERPL-SCNC: 4.7 MMOL/L — SIGNIFICANT CHANGE UP (ref 3.5–5.3)
PROT SERPL-MCNC: 7.8 GM/DL — SIGNIFICANT CHANGE UP (ref 6–8.3)
RBC # BLD: 4.34 M/UL — SIGNIFICANT CHANGE UP (ref 4.2–5.8)
RBC # FLD: 18.8 % — HIGH (ref 10.3–14.5)
SALICYLATES SERPL-MCNC: <1.7 MG/DL — LOW (ref 2.8–20)
SODIUM SERPL-SCNC: 136 MMOL/L — SIGNIFICANT CHANGE UP (ref 135–145)
WBC # BLD: 5.35 K/UL — SIGNIFICANT CHANGE UP (ref 3.8–10.5)
WBC # FLD AUTO: 5.35 K/UL — SIGNIFICANT CHANGE UP (ref 3.8–10.5)

## 2025-08-01 PROCEDURE — 99285 EMERGENCY DEPT VISIT HI MDM: CPT

## 2025-08-01 PROCEDURE — 70450 CT HEAD/BRAIN W/O DYE: CPT | Mod: 26

## 2025-08-01 PROCEDURE — 90792 PSYCH DIAG EVAL W/MED SRVCS: CPT | Mod: 95

## 2025-08-01 PROCEDURE — 72125 CT NECK SPINE W/O DYE: CPT | Mod: 26

## 2025-08-01 PROCEDURE — 93010 ELECTROCARDIOGRAM REPORT: CPT

## 2025-08-01 RX ORDER — TRAMADOL HYDROCHLORIDE 50 MG/1
25 TABLET, FILM COATED ORAL ONCE
Refills: 0 | Status: DISCONTINUED | OUTPATIENT
Start: 2025-08-01 | End: 2025-08-01

## 2025-08-01 RX ORDER — KETOROLAC TROMETHAMINE 30 MG/ML
15 INJECTION, SOLUTION INTRAMUSCULAR; INTRAVENOUS ONCE
Refills: 0 | Status: DISCONTINUED | OUTPATIENT
Start: 2025-08-01 | End: 2025-08-01

## 2025-08-01 RX ORDER — WHITE PETROLATUM 1 G/G
1 OINTMENT TOPICAL ONCE
Refills: 0 | Status: COMPLETED | OUTPATIENT
Start: 2025-08-01 | End: 2025-08-01

## 2025-08-01 RX ORDER — METOCLOPRAMIDE HCL 10 MG
10 TABLET ORAL ONCE
Refills: 0 | Status: COMPLETED | OUTPATIENT
Start: 2025-08-01 | End: 2025-08-01

## 2025-08-01 RX ORDER — ACETAMINOPHEN 500 MG/5ML
650 LIQUID (ML) ORAL ONCE
Refills: 0 | Status: COMPLETED | OUTPATIENT
Start: 2025-08-01 | End: 2025-08-01

## 2025-08-01 RX ORDER — ONDANSETRON HCL/PF 4 MG/2 ML
4 VIAL (ML) INJECTION ONCE
Refills: 0 | Status: COMPLETED | OUTPATIENT
Start: 2025-08-01 | End: 2025-08-01

## 2025-08-01 RX ORDER — QUETIAPINE FUMARATE 25 MG/1
50 TABLET ORAL ONCE
Refills: 0 | Status: COMPLETED | OUTPATIENT
Start: 2025-08-01 | End: 2025-08-01

## 2025-08-01 RX ORDER — CHLORDIAZEPOXIDE HCL 10 MG
25 CAPSULE ORAL ONCE
Refills: 0 | Status: DISCONTINUED | OUTPATIENT
Start: 2025-08-01 | End: 2025-08-01

## 2025-08-01 RX ORDER — DIPHENHYDRAMINE HCL 12.5MG/5ML
50 ELIXIR ORAL ONCE
Refills: 0 | Status: COMPLETED | OUTPATIENT
Start: 2025-08-01 | End: 2025-08-01

## 2025-08-01 RX ADMIN — Medication 50 MILLIGRAM(S): at 08:05

## 2025-08-01 RX ADMIN — Medication 10 MILLIGRAM(S): at 16:38

## 2025-08-01 RX ADMIN — TRAMADOL HYDROCHLORIDE 25 MILLIGRAM(S): 50 TABLET, FILM COATED ORAL at 09:38

## 2025-08-01 RX ADMIN — Medication 650 MILLIGRAM(S): at 16:38

## 2025-08-01 RX ADMIN — QUETIAPINE FUMARATE 50 MILLIGRAM(S): 25 TABLET ORAL at 14:02

## 2025-08-01 RX ADMIN — Medication 650 MILLIGRAM(S): at 04:02

## 2025-08-01 RX ADMIN — KETOROLAC TROMETHAMINE 15 MILLIGRAM(S): 30 INJECTION, SOLUTION INTRAMUSCULAR; INTRAVENOUS at 06:27

## 2025-08-01 RX ADMIN — Medication 4 MILLIGRAM(S): at 14:35

## 2025-08-01 RX ADMIN — Medication 25 MILLIGRAM(S): at 10:02

## 2025-08-01 RX ADMIN — WHITE PETROLATUM 1 APPLICATION(S): 1 OINTMENT TOPICAL at 08:06

## 2025-08-01 SDOH — ECONOMIC STABILITY - HOUSING INSECURITY: HOMELESSNESS UNSPECIFIED: Z59.00

## 2025-08-01 NOTE — ED ADULT NURSE NOTE - HPI (INCLUDE ILLNESS QUALITY, SEVERITY, DURATION, TIMING, CONTEXT, MODIFYING FACTORS, ASSOCIATED SIGNS AND SYMPTOMS)
Patient stating that he feels like hurting himself due to constant pain all over body from his eczema .  Pt states "I just want to jump in front of a car"  denies any HI/AI/VI

## 2025-08-01 NOTE — ED PROVIDER NOTE - NS ED ROS FT
General: Denies fever, chills  HEENT: Denies sensory changes, sore throat  Neck: Denies neck pain, neck stiffness  Resp: Denies coughing, SOB  Cardiovascular: Denies CP, palpitations, LE edema  GI: Denies nausea, vomiting, abdominal pain, diarrhea, constipation, blood in stool  : Denies dysuria, hematuria, frequency, incontinence  MSK: Denies back pain  Neuro: Denies HA, dizziness, numbness, weakness  Skin: Denies rashes. 1 week

## 2025-08-01 NOTE — ED PROVIDER NOTE - CLINICAL SUMMARY MEDICAL DECISION MAKING FREE TEXT BOX
This patient is a 44-year-old male presenting to the emergency department today for psychiatric evaluation.  He was initially ordered Vaseline for the eczema on his arms and legs.  He was ordered Benadryl for the itching.    On reevaluation, he states that the itching is severely improved, however the Vaseline did not assist with the pain.  He is requesting Librium for withdrawal symptoms, however is not complaining of withdrawal symptoms at this time.  Librium was ordered for the patient.  Is also ordered tramadol for pain control as he had already been given Tylenol and Toradol the night before with minimal relief of his pain.    He now states that the pain is improved, however still is endorsing suicidal ideation at this time.  For this reason telepsych was consulted.  At this time, the patient is medically cleared.  Telepsych was consulted.    Telepsych has been consulted, however have not evaluated the patient.  This does lima the end of my shift.  For this reason, the patient will be signed out to the oncoming physician.  Please see the oncoming physician's addendum's, notes, and progress notes for any change in this patient's management or care. This patient is a 44-year-old male presenting to the emergency department today for psychiatric evaluation.  He was initially ordered Vaseline for the eczema on his arms and legs.  He was ordered Benadryl for the itching.    On reevaluation, he states that the itching is severely improved, however the Vaseline did not assist with the pain.  He is requesting Librium for withdrawal symptoms, however is not complaining of withdrawal symptoms at this time.  Librium was ordered for the patient.  Is also ordered tramadol for pain control as he had already been given Tylenol and Toradol the night before with minimal relief of his pain.    He now states that the pain is improved, however still is endorsing suicidal ideation at this time.  For this reason telepsych was consulted.  At this time, the patient is medically cleared.  Telepsych was consulted.    Telepsych did see this patient.  Per the recommendation, the patient is cleared from a psychiatric standpoint for discharge.  Patient is still complaining of nausea and pain.  Was given Tylenol for this as well as Reglan.    At this time, we believe the patient is safe for discharge.  He is homeless and would like information about alcohol rehab facilities and chemical dependency facilities.  I discussed this with our  who states that he will speak with the patient about his unhoused situation as well as want for alcohol and chemical dependency rehabilitation.    This does lima the end of my shift.  At this time, social work has not spoken with the patient.  For this reason, the patient will be signed out to the oncoming physician.  Please see the oncoming physician's addendum's, notes, and progress notes for any change in this patient's management or care. This patient is a 44-year-old male presenting to the emergency department today for psychiatric evaluation.  He was initially ordered Vaseline for the eczema on his arms and legs.  He was ordered Benadryl for the itching.    On reevaluation, he states that the itching is severely improved, however the Vaseline did not assist with the pain.  He is requesting Librium for withdrawal symptoms, however is not complaining of withdrawal symptoms at this time.  Librium was ordered for the patient.  Is also ordered tramadol for pain control as he had already been given Tylenol and Toradol the night before with minimal relief of his pain.    He now states that the pain is improved, however still is endorsing suicidal ideation at this time.  For this reason telepsych was consulted.  At this time, the patient is medically cleared.  Telepsych was consulted.    Telepsych did see this patient.  Per the recommendation, the patient is cleared from a psychiatric standpoint for discharge.  Patient is still complaining of nausea and pain.  Was given Tylenol for this as well as Reglan.    At this time, we believe the patient is safe for discharge.  He is homeless and would like information about alcohol rehab facilities and chemical dependency facilities.  I discussed this with our  who states that he will speak with the patient about his unhoused situation as well as want for alcohol and chemical dependency rehabilitation.

## 2025-08-01 NOTE — ED ADULT NURSE NOTE - NSFALLUNIVINTERV_ED_ALL_ED
Bed/Stretcher in lowest position, wheels locked, appropriate side rails in place/Call bell, personal items and telephone in reach/Instruct patient to call for assistance before getting out of bed/chair/stretcher/Non-slip footwear applied when patient is off stretcher/[place] to call system/Physically safe environment - no spills, clutter or unnecessary equipment/Purposeful proactive rounding/Room/bathroom lighting operational, light cord in reach

## 2025-08-01 NOTE — ED PROVIDER NOTE - PHYSICAL EXAMINATION
General: Well appearing male in no acute distress  HEENT: Normocephalic, atraumatic. Moist mucous membranes. Oropharynx clear. No lymphadenopathy.  Eyes: No scleral icterus. EOMI. SAVANNA.  Neck:. Soft and supple. Full ROM without pain. No midline tenderness  Cardiac: Regular rate and regular rhythm. No murmurs, rubs, gallops. Peripheral pulses 2+ and symmetric. No LE edema.  Resp: Lungs CTAB. Speaking in full sentences. No wheezes, rales or rhonchi.  Abd: Soft, non-tender, non-distended. No guarding or rebound. No scars, masses, or lesions.  Back: Spine midline and non-tender. No CVA tenderness.    Skin: No rashes, abrasions, or lacerations.  Neuro: AO x 3. Moves all extremities symmetrically. Motor strength and sensation grossly intact.

## 2025-08-01 NOTE — CHART NOTE - NSCHARTNOTEFT_GEN_A_CORE
JOLENE met with homeless pt who is requesting assistance with connection to substance abuse treatment services for alcohol use. Pt stated that he would be willing to accept SW setting up transport to nearest inpatient detox facility, [facility]. JOLENE made pt's MD and social work  made aware.

## 2025-08-01 NOTE — ED ADULT NURSE REASSESSMENT NOTE - NS ED NURSE REASSESS COMMENT FT1
pt disposition by  to [facility] detox facility. pt medicated as per MD orders. pt was awake and in no respiratory distress when leaving the ED, pt provided transporting to detox facility.

## 2025-08-01 NOTE — ED PROVIDER NOTE - PHYSICAL EXAMINATION
GENERAL: The patient appears well and is in no apparent distress.  Patient is awake.  Jovial laughing in the room.  Drinking ice water.    EYES: Pupils equal and reactive.  Extraocular eye movements are intact.    ENT: Head is atraumatic.  Posterior oropharynx is unremarkable.      RESPIRATORY: Lungs are clear to auscultation bilaterally.  The patient has no significant wheezing, rhonchi, or rales.    CARDIOVASCULAR: The patient has a regular rate and rhythm with no significant murmurs, rubs, or gallops.    ABDOMEN: Abdomen is soft, nondistended, and non-peritoneal.  Bowel sounds are appreciated in all 4 quadrants.  The patient has no focal areas of tenderness.    SKIN: Eczematous changes noted over the patient's bilateral arms and legs.  Eczematous changes noted over the face as well.    MUSCULOSKELETAL: Patient has good range of motion of all extremities.  The patient has good distal cap refill.  The patient has palpable distal pulses.  No obvious edema is noted.    NEUROLOGIC: Cranial nerves II through XII are grossly within normal limits.  Sensory and motor examination is unremarkable.    PSYCHIATRIC: Patient is awake, alert, and oriented ×4.

## 2025-08-01 NOTE — ED PROVIDER NOTE - NS ED ROS FT
CONSTITUTIONAL: No weight loss, fever, chills, weakness or fatigue.    HEENT:    Eyes: No visual loss, blurred vision, double vision or yellow sclerae.  Ears, Nose, Throat: No hearing loss, sneezing, congestion, runny nose or sore throat.    SKIN: No rash or itching.    NEUROLOGICAL: No headache, dizziness, syncope, paralysis, ataxia, numbness or tingling in the extremities. No change in bowel or bladder control.    MUSCULOSKELETAL: No muscle, back pain, joint pain or stiffness.    PSYCHIATRIC: Positive for suicidal ideation.  No homicidal ideation

## 2025-08-01 NOTE — ED ADULT NURSE NOTE - CHIEF COMPLAINT QUOTE
pt was just dc'ed from this hospital after coming in for fall and intox.  Pt now stating that he feels like hurting himself due to constant pain. Pt has pain all over body.  Pt states "I just want to jump in front of a car"  denies any HI/AI/VI  hx of asthma, htn, dm.  nkda

## 2025-08-01 NOTE — ED BEHAVIORAL HEALTH ASSESSMENT NOTE - RISK ASSESSMENT
denies suicidal ideation, denies severe depression, euthyic, future oriented, wants help, no cass or psychosis

## 2025-08-01 NOTE — ED BEHAVIORAL HEALTH ASSESSMENT NOTE - SUMMARY
44M; homeless; single; unemployed; hx of polysubstance abuse; hx of multiple psychiaric diagnoses per psyckes including schizophrenia, bipolar, major depressive disorder, ptsd; prior psychiatric hospitalizations and rehabs for substance abuse; bib self with BAL 88, c/o pain and verbalizing suicidal ideation.   pt was seen in ED last night for etoh intoxication and s/p fall, he was discharged, returned next (this) morning with intoxication,   reports of pain and excema, and suicidal ideation.   On interview pt denies suicidal ideation intent plan. he is affectively euthymic and stable, behaviorally stable, future oriented, wants to live, and has multiple goals and plans, and would like to see his needs met. he is motivated for substance use treatment but would first like relief from pain, eczema, withdrawal and social work help .   Collateral is not concerning for acute risk.   Patient is  not an elevated acute psychiatric risk or in need of psychiatric hospitalization. 44M; homeless; single; unemployed; hx of polysubstance abuse; hx of multiple psychiaric diagnoses per psyckes including schizophrenia, bipolar, major depressive disorder, ptsd; prior psychiatric hospitalizations and rehabs for substance abuse; bib self with BAL 88, c/o pain and verbalizing suicidal ideation.   pt was seen in ED last night for etoh intoxication and s/p fall, he was discharged, returned next (this) morning with intoxication,   reports of pain and excema, and suicidal ideation.   On interview pt denies suicidal ideation intent plan. he is affectively euthymic and stable, behaviorally stable, future oriented, wants to live, and has multiple goals and plans, and would like to see his needs met. he is motivated for substance use treatment but would first like relief from pain, eczema, withdrawal and social work help .   Collateral is not concerning for acute psychiatric risk.   Patient is  not an elevated acute psychiatric risk or in need of psychiatric hospitalization.  Recommend continued medical treatment and referral to chemical dependency treatment (inpatient substance use rehab if pt agrees.)

## 2025-08-01 NOTE — ED PROVIDER NOTE - INTERNATIONAL TRAVEL
recvd email that pt has some questions about chemo called the pt & spoke to his daughter Jacobo Berman she sd that it has something to do w/an auth  Told her that I can fins out about it & let her know   I also went over the benefits w/her & she thanked me for the information No

## 2025-08-01 NOTE — ED PROVIDER NOTE - CLINICAL SUMMARY MEDICAL DECISION MAKING FREE TEXT BOX
male presenting to the ED for pain after falling down stairs.    + EtOH use  patient clinically sober  will r/o ICH, fracture  likely dispo home if negative imaging

## 2025-08-01 NOTE — ED ADULT NURSE NOTE - OBJECTIVE STATEMENT
No lesions; no rash
Patient alert and verbally responsive,  was just dc'ed from this hospital after coming in for fall and intox.  Pt now stating that he feels like hurting himself due to constant pain all over body from his eczema .  Pt states "I just want to jump in front of a car"  denies any HI/AI/VI  hx of asthma, htn, dm.  nkda

## 2025-08-01 NOTE — ED PROVIDER NOTE - PATIENT PORTAL LINK FT
You can access the FollowMyHealth Patient Portal offered by [facility] by registering at the following website: [email or web address]. By joining [facility]’s FollowMyHealth portal, you will also be able to view your health information using other applications (apps) compatible with our system.

## 2025-08-01 NOTE — ED BEHAVIORAL HEALTH NOTE - BEHAVIORAL HEALTH NOTE
========================     FOR EACH COLLATERAL     ========================     Collateral below has requested that the information provided remain confidential: Yes [ x] No [  ]   Collateral below has provided information that patient is/may be unaware of: Yes [ x ] No [  ]   Patient gives permission to obtain collateral from _____:   ( ) Yes  ( x )  No   Rationale for overriding objection             (  ) Lack of capacity. Details: ________             ( x ) Assessing risk of danger to self/others. Details: ________   Rationale for selecting specific collateral source             ( x ) Potential to impact risk of danger to self/others and no alternative equivalent. Details: _____   NAME: Jenna COPELAND    NUMBER:    RELATIONSHIP: Mother  RELIABILITY: Reliable.    COMMENTS: BTCM contacted collateral to obtain background information regarding safety after collateral was previously established.   ========================   PATIENT DEMOGRAPHICS:   ========================     This writer contacted collateral to obtain information regarding the patient's background. During our conversation, collateral appeared to be frustrated and guarded and did not provide insight into the patient history only citing that the patient has a history of substance abuse and claimed it will eventually kill him. Collateral reported the patient is not suicidal, nor violent of any kind. Collateral insisted the patient seek rehab for himself.

## 2025-08-01 NOTE — ED PROVIDER NOTE - HIV OFFER
Opt out
Implemented All Universal Safety Interventions:  [place] to call system. Call bell, personal items and telephone within reach. Instruct patient to call for assistance. Room bathroom lighting operational. Non-slip footwear when patient is off stretcher. Physically safe environment: no spills, clutter or unnecessary equipment. Stretcher in lowest position, wheels locked, appropriate side rails in place.

## 2025-08-01 NOTE — ED ADULT TRIAGE NOTE - CHIEF COMPLAINT QUOTE
pt was just dc'ed from this hospital after coming in for fall and intox.  Pt now stating that he feels like hurting himself due to constant pain. Pt has pain all over body.  Pt states "I just want to jump in front of a car"  hx of asthma, htn, dm.  nkda pt was just dc'ed from this hospital after coming in for fall and intox.  Pt now stating that he feels like hurting himself due to constant pain. Pt has pain all over body.  Pt states "I just want to jump in front of a car"  denies any HI/AI/VI  hx of asthma, htn, dm.  nkda

## 2025-08-01 NOTE — ED BEHAVIORAL HEALTH ASSESSMENT NOTE - HPI (INCLUDE ILLNESS QUALITY, SEVERITY, DURATION, TIMING, CONTEXT, MODIFYING FACTORS, ASSOCIATED SIGNS AND SYMPTOMS)
44M; homeless; single; unemployed; hx of polysubstance abuse; hx of multiple psychiaric diagnoses per psyckes including schizophrenia, bipolar, major depressive disorder, ptsd; prior psychiatric hospitalizations and rehabs for substance abuse; bib self with BAL 88, c/o pain and verbalizing suicidal ideation.   pt was seen in ED last night for etoh intoxication and s/p fall, he was discharged, returned next (this) morning with intoxication,   reports of pain and excema, and suicidal ideation.   pt is aa0x4, cooperative, pleasant and euthymic appearing. he says his main issues are alcohol abuse, exczema, homelessness, unemployment. he denies having suicidal ideation intent plan. he is future oriented, saying he will get his disability check on august 4, plans to pay back people he owes money, then would like to enter a substance abuse program.   he was sober from november to february and would like to be so again.  he reports chronic depression. he has a psychiatrist (unsure of name) and therapist at [facility].  He denies ah/vh/si/hi . denies manic or psychotic sxs. denies other substance use   he is asking for one time order of seroquel, and medications for alcohol withdrawal and pain, and to see the . 44M; homeless; single; unemployed; hx of polysubstance abuse; hx of multiple psychiaric diagnoses per psyckes including schizophrenia, bipolar, major depressive disorder, ptsd; prior psychiatric hospitalizations and rehabs for substance abuse; bib self with BAL 88, c/o pain and verbalizing suicidal ideation.   pt was seen in ED last night for etoh intoxication and s/p fall, he was discharged, returned next (this) morning with intoxication,   reports of pain and excema, and suicidal ideation.   pt is aa0x4, cooperative, pleasant and euthymic appearing. he says his main issues are alcohol abuse, exczema, homelessness, unemployment. he denies having suicidal ideation intent plan. he is future oriented, saying he will get his disability check on august 4, plans to pay back people he owes money, then would like to enter a substance abuse program.   he was sober from november to february and would like to be so again.  he reports chronic depression. he has a psychiatrist (unsure of name) and therapist at [facility].  He denies ah/vh/si/hi . denies manic or psychotic sxs. denies other substance use   he is asking for one time order of seroquel, and medications for alcohol withdrawal and pain, and to see the SW.  see note by Amparo Callejas for collateral from mother, who is concerned by pt's alcohol abuse but did not have psychiatric   or other safety concerns,

## 2025-08-01 NOTE — ED BEHAVIORAL HEALTH ASSESSMENT NOTE - REFERRAL / APPOINTMENT DETAILS
f/u with [facility] therapist/psychiatrist; refer to chemical dependency treatment f/u with [facility] therapist/psychiatrist; SW consult for referral to alcohol/chemical dependency treatment

## 2025-08-01 NOTE — ED PROVIDER NOTE - PATIENT PORTAL LINK FT
You can access the FollowMyHealth Patient Portal offered by [facility] by registering at the following website: [email or web address]. By joining [email or web address]’s FollowMyHealth portal, you will also be able to view your health information using other applications (apps) compatible with our system.